# Patient Record
Sex: MALE | Race: WHITE | NOT HISPANIC OR LATINO | Employment: FULL TIME | URBAN - METROPOLITAN AREA
[De-identification: names, ages, dates, MRNs, and addresses within clinical notes are randomized per-mention and may not be internally consistent; named-entity substitution may affect disease eponyms.]

---

## 2017-01-04 DIAGNOSIS — I10 HYPERTENSION GOAL BP (BLOOD PRESSURE) < 140/90: Primary | ICD-10-CM

## 2017-01-04 RX ORDER — LISINOPRIL 40 MG/1
40 TABLET ORAL DAILY
Qty: 90 TABLET | Refills: 0 | Status: CANCELLED | OUTPATIENT
Start: 2017-01-04

## 2017-01-04 RX ORDER — HYDROCHLOROTHIAZIDE 25 MG/1
25 TABLET ORAL DAILY
Qty: 90 TABLET | Refills: 0 | Status: CANCELLED | OUTPATIENT
Start: 2017-01-04

## 2017-01-04 NOTE — TELEPHONE ENCOUNTER
Duplicate Request- this refill (same medication, sig, and indication) has already been processed and sent to pharmacy today.    Last Written Prescription Date:  lisinopril (PRINIVIL/ZESTRIL) 40 MG tablet 30 tablet 0 1/4/2017  No      Sig: TAKE 1 TABLET BY MOUTH ONCE DAILY     Class: E-Prescribe     Notes to Pharmacy: Approved x 1 with no refills, patient needs to be seen        hydrochlorothiazide (HYDRODIURIL) 25 MG tablet 30 tablet 0 1/4/2017  No      Sig: TAKE 1 TABLET BY MOUTH ONCE DAILY     Class: E-Prescribe     Notes to Pharmacy: Approved x 1 with no refills, patient needs to be seen         Patient informed RX sent and needs follow up appt for more refills.  appt scheduled for 1/10/17 at 5:40 pm    Krystal Willis RN,   [facility]

## 2017-01-04 NOTE — TELEPHONE ENCOUNTER
[facility] Call Center    Phone Message    Name of Caller: self    Phone Number: Cell number on file:    Telephone Information:   Mobile [phone]       Best time to return call: yes    May a detailed message be left on voicemail: yes    Relation to patient: Self    Reason for Call: Medication Refill Request    Has the patient contacted the pharmacy for the refill? Yes   Name of medication being requested: hydrochlorothiazide (HYDRODIURIL) 25 MG tablet,lisinopril (PRINIVIL,ZESTRIL) 40 MG tablet  Provider who prescribed the medication: Honey Ramey  Pharmacy: [facility]  Date medication is needed: asap! Pt has been out of medication for 5 days spouse states. Pt needs medication asap scared of BP high        Action Taken: Message routed to:  Primary Care p [id number]

## 2017-01-10 ENCOUNTER — OFFICE VISIT (OUTPATIENT)
Dept: PEDIATRICS | Facility: CLINIC | Age: 37
End: 2017-01-10
Payer: COMMERCIAL

## 2017-01-10 VITALS
WEIGHT: 315 LBS | BODY MASS INDEX: 42.66 KG/M2 | DIASTOLIC BLOOD PRESSURE: 60 MMHG | OXYGEN SATURATION: 100 % | HEIGHT: 72 IN | HEART RATE: 94 BPM | TEMPERATURE: 97.1 F | SYSTOLIC BLOOD PRESSURE: 115 MMHG

## 2017-01-10 DIAGNOSIS — Z13.6 CARDIOVASCULAR SCREENING; LDL GOAL LESS THAN 130: ICD-10-CM

## 2017-01-10 DIAGNOSIS — I10 HYPERTENSION GOAL BP (BLOOD PRESSURE) < 140/90: Primary | ICD-10-CM

## 2017-01-10 PROCEDURE — 99213 OFFICE O/P EST LOW 20 MIN: CPT | Performed by: NURSE PRACTITIONER

## 2017-01-10 RX ORDER — HYDROCHLOROTHIAZIDE 25 MG/1
25 TABLET ORAL DAILY
Qty: 90 TABLET | Refills: 1 | Status: SHIPPED | OUTPATIENT
Start: 2017-01-10 | End: 2017-10-04

## 2017-01-10 RX ORDER — LISINOPRIL 40 MG/1
40 TABLET ORAL DAILY
Qty: 90 TABLET | Refills: 1 | Status: SHIPPED | OUTPATIENT
Start: 2017-01-10 | End: 2017-10-04

## 2017-01-10 NOTE — MR AVS SNAPSHOT
After Visit Summary   1/10/2017    Armando Domingo    MRN: [id number]           Patient Information     Date Of Birth          1980        Visit Information        Provider Department      1/10/2017 5:40 PM Kami Ramey APRN CNP [facility]        Today's Diagnoses     Hypertension goal BP (blood pressure) < 140/90    -  1     CARDIOVASCULAR SCREENING; LDL GOAL LESS THAN 130           Care Instructions    It was a pleasure seeing you today at the [facility] - Primary Care. Thank you for allowing us to care for you today. We truly hope we provided you with the excellent service you deserve. Please let us know if there is anything else we can do for you so we can be sure you are leaving completley satisfied with your care experience.       General information about your clinic   Clinic Hours Lab Hours (Appointments are required)   Mon-Thurs: 7:30 AM - 7 PM Mon-Thurs: 7:30 AM - 7 PM   Fri: 7:30 AM - 5 PM Fri: 7:30 AM - 5 PM        After Hours Nurse Advise & Appts:  [place] Nurse Advisors: [phone]  [place] On Call: to make appointments anytime: [phone] On Call Physician: call [phone] and answering service will page the on call physician.        For urgent appointments, please call [phone] and ask for the triage nurse or your care team clinic nurse.  How to contact my care team:  MyChart: [email or web address]   Phone: [phone]   Fax: [phone]       [place] [facility]:   Phone: [phone]  Hours: 8:00 AM - 6:00 PM  Medication Refills:  Call your pharmacy and they will forward the refill to us. Please allow 3 business days for your refills to be completed.       Normal or non-critical lab and imaging results will be communicated to you by MyChart, letter or phone within 7 days.  If you do not hear from us within 10 days, please call the clinic. If you have a critical or abnormal lab result, we will notify you by phone as soon as  possible.       We now have PWIC (Pediatric Walk in Care)  Monday-Friday from 7:30-4. Simply walk in and be seen for your urgent needs like cough, fever, rash, diarrhea or vomiting, pink eye, UTI. No appointments needed. Ask one of the team for more information      -Your Care Team:    Dr. Natali Tucker - Internal Medicine/Pediatrics   Dr. Yogesh Zaldivar - Family Medicine  Dr. Dinora Baig - Pediatrics  Kami Ramey CNP - Family Practice Nurse Practitioner  Dr. Jennyfer Downs - Pediatrics  Dr. Romero Tierney - Internal Medicine          PLAN:   1.   Symptomatic therapy suggested: Continue current medications.    2.  Orders Placed This Encounter   Medications     lisinopril (PRINIVIL/ZESTRIL) 40 MG tablet     Sig: Take 1 tablet (40 mg) by mouth daily     Dispense:  90 tablet     Refill:  1     hydrochlorothiazide (HYDRODIURIL) 25 MG tablet     Sig: Take 1 tablet (25 mg) by mouth daily     Dispense:  90 tablet     Refill:  1     Orders Placed This Encounter   Procedures     Lipid panel reflex to direct LDL     Comprehensive metabolic panel     Albumin Random Urine Quantitative     JUST IN CASE       3. Patient needs to follow up in if no improvement,or sooner if worsening of symptoms or other symptoms develop.  FUTURE LABS:       - Schedule a fasting blood draw   Follow up in 6 months.  Schedule physical exam             Follow-ups after your visit        Future tests that were ordered for you today     Open Future Orders        Priority Expected Expires Ordered    Lipid panel reflex to direct LDL Routine  7/10/2017 1/10/2017    Comprehensive metabolic panel Routine  7/10/2017 1/10/2017    Albumin Random Urine Quantitative Routine  7/10/2017 1/10/2017    JUST IN CASE Routine  7/10/2017 1/10/2017            Who to contact     If you have questions or need follow up information about today's clinic visit or your schedule please contact [facility] directly at [phone].  Normal or non-critical lab and  "imaging results will be communicated to you by MyChart, letter or phone within 4 business days after the clinic has received the results. If you do not hear from us within 7 days, please contact the clinic through [employer] or phone. If you have a critical or abnormal lab result, we will notify you by phone as soon as possible.  Submit refill requests through [employer] or call your pharmacy and they will forward the refill request to us. Please allow 3 business days for your refill to be completed.          Additional Information About Your Visit        Mixers Information     [employer]ers is an electronic gateway that provides easy, online access to your medical records. With Mixers, you can request a clinic appointment, read your test results, renew a prescription or communicate with your care team.     To sign up for [employer]ers visit the website at [email or web address]   You will be asked to enter the access code listed below, as well as some personal information. Please follow the directions to create your username and password.     Your access code is: [email or web address]  Expires: 4/10/2017  6:07 PM     Your access code will  in 90 days. If you need help or a new code, please contact your [facility] or call [phone] for assistance.        Care EveryWhere ID     This is your Care EveryWhere ID. This could be used by other organizations to access your [place] medical records  [id number]        Your Vitals Were     Pulse Temperature Height BMI (Body Mass Index) Pulse Oximetry       94 97.1  F (36.2  C) (Temporal) 5' 11.5\" (1.816 m) 46.70 kg/m2 100%        Blood Pressure from Last 3 Encounters:   01/10/17 115/60   16 130/85   09/15/15 140/90    Weight from Last 3 Encounters:   01/10/17 339 lb 8 oz (153.996 kg)   16 333 lb (151.048 kg)   09/15/15 335 lb 4.8 oz (152.091 kg)                 Today's Medication Changes          These changes are accurate as of: 1/10/17  6:07 " PM.  If you have any questions, ask your nurse or doctor.               These medicines have changed or have updated prescriptions.        Dose/Directions    hydrochlorothiazide 25 MG tablet   Commonly known as:  HYDRODIURIL   This may have changed:  See the new instructions.   Used for:  Hypertension goal BP (blood pressure) < 140/90   Changed by:  Kami Ramey APRN CNP        Dose:  25 mg   Take 1 tablet (25 mg) by mouth daily   Quantity:  90 tablet   Refills:  1       lisinopril 40 MG tablet   Commonly known as:  PRINIVIL/ZESTRIL   This may have changed:  See the new instructions.   Used for:  Hypertension goal BP (blood pressure) < 140/90   Changed by:  Kami Ramey APRN CNP        Dose:  40 mg   Take 1 tablet (40 mg) by mouth daily   Quantity:  90 tablet   Refills:  1            Where to get your medicines      These medications were sent to [facility] [id number] [address] [address]     Phone:  [phone]    - hydrochlorothiazide 25 MG tablet  - lisinopril 40 MG tablet             Primary Care Provider    [facility]       No address on file        Thank you!     Thank you for choosing [facility]  for your care. Our goal is always to provide you with excellent care. Hearing back from our patients is one way we can continue to improve our services. Please take a few minutes to complete the written survey that you may receive in the mail after your visit with us. Thank you!             Your Updated Medication List - Protect others around you: Learn how to safely use, store and throw away your medicines at www.disposemymeds.org.          This list is accurate as of: 1/10/17  6:07 PM.  Always use your most recent med list.                   Brand Name Dispense Instructions for use    aspirin 81 MG tablet     30 tablet    Take 1 tablet by mouth daily.       hydrochlorothiazide 25  MG tablet    HYDRODIURIL    90 tablet    Take 1 tablet (25 mg) by mouth daily       lisinopril 40 MG tablet    PRINIVIL/ZESTRIL    90 tablet    Take 1 tablet (40 mg) by mouth daily

## 2017-01-10 NOTE — PATIENT INSTRUCTIONS
It was a pleasure seeing you today at the [facility] [[email or web address] - Primary Care. Thank you for allowing us to care for you today. We truly hope we provided you with the excellent service you deserve. Please let us know if there is anything else we can do for you so we can be sure you are leaving completley satisfied with your care experience.       General information about your clinic   Clinic Hours Lab Hours (Appointments are required)   Mon-Thurs: 7:30 AM - 7 PM Mon-Thurs: 7:30 AM - 7 PM   Fri: 7:30 AM - 5 PM Fri: 7:30 AM - 5 PM        After Hours Nurse Advise & Appts:  [place] Nurse Advisors: [phone]  [place] On Call: to make appointments anytime: [phone] On Call Physician: call [phone] and answering service will page the on call physician.        For urgent appointments, please call [phone] and ask for the triage nurse or your care team clinic nurse.  How to contact my care team:  [place]: [email or web address]   Phone: [phone]   Fax: [phone]       [place] [facility]:   Phone: [phone]  Hours: 8:00 AM - 6:00 PM  Medication Refills:  Call your pharmacy and they will forward the refill to us. Please allow 3 business days for your refills to be completed.       Normal or non-critical lab and imaging results will be communicated to you by MyChart, letter or phone within 7 days.  If you do not hear from us within 10 days, please call the clinic. If you have a critical or abnormal lab result, we will notify you by phone as soon as possible.       We now have PWIC (Pediatric Walk in Care)  Monday-Friday from 7:30-4. Simply walk in and be seen for your urgent needs like cough, fever, rash, diarrhea or vomiting, pink eye, UTI. No appointments needed. Ask one of the team for more information      -Your Care Team:    Dr. Natali Tucker - Internal Medicine/Pediatrics   Dr. Yogesh Zaldivar - Family Medicine  Dr. Dinora Baig - Pediatrics  Kami Ramey CNP - Family Practice Nurse  Practitioner  Dr. Jennyfer Downs - Pediatrics  Dr. Romero Tierney - Internal Medicine          PLAN:   1.   Symptomatic therapy suggested: Continue current medications.    2.  Orders Placed This Encounter   Medications     lisinopril (PRINIVIL/ZESTRIL) 40 MG tablet     Sig: Take 1 tablet (40 mg) by mouth daily     Dispense:  90 tablet     Refill:  1     hydrochlorothiazide (HYDRODIURIL) 25 MG tablet     Sig: Take 1 tablet (25 mg) by mouth daily     Dispense:  90 tablet     Refill:  1     Orders Placed This Encounter   Procedures     Lipid panel reflex to direct LDL     Comprehensive metabolic panel     Albumin Random Urine Quantitative     JUST IN CASE       3. Patient needs to follow up in if no improvement,or sooner if worsening of symptoms or other symptoms develop.  FUTURE LABS:       - Schedule a fasting blood draw   Follow up in 6 months.  Schedule physical exam

## 2017-01-10 NOTE — PROGRESS NOTES
SUBJECTIVE:                                                    Armando Domingo is a 36 year old male who presents to clinic today for the following health issues:    Hypertension Follow-up      Outpatient blood pressures are not being checked.    Low Salt Diet: not monitoring salt       Amount of exercise or physical activity: very little    Problems taking medications regularly: No    Medication side effects: none    Diet: regular (no restrictions)    Problem list and histories reviewed & adjusted, as indicated.  Additional history: as documented    Patient Active Problem List   Diagnosis     Hypertension goal BP (blood pressure) < 140/90     Hyperlipidemia LDL goal <130     Morbid obesity (H)     Past Surgical History   Procedure Laterality Date     Soft tissue surgery       cyst removal       Social History   Substance Use Topics     Smoking status: Former Smoker -- 0.10 packs/day for 15 years     Types: Cigarettes     Start date: 01/01/1996     Quit date: 01/01/2013     Smokeless tobacco: Never Used     Alcohol Use: 1.0 oz/week     2 Standard drinks or equivalent per week     Family History   Problem Relation Age of Onset     DIABETES Mother      Hypertension Mother      Cancer - colorectal Maternal Grandfather      DIABETES Paternal Grandmother      CEREBROVASCULAR DISEASE Paternal Grandmother      C.A.D. Father      Asthma No family hx of      Breast Cancer No family hx of      Prostate Cancer No family hx of      Thyroid Disease No family hx of      Lipids No family hx of          Current Outpatient Prescriptions   Medication Sig Dispense Refill     lisinopril (PRINIVIL/ZESTRIL) 40 MG tablet TAKE 1 TABLET BY MOUTH ONCE DAILY 30 tablet 0     hydrochlorothiazide (HYDRODIURIL) 25 MG tablet TAKE 1 TABLET BY MOUTH ONCE DAILY 30 tablet 0     aspirin 81 MG tablet Take 1 tablet by mouth daily. 30 tablet 3     No Known Allergies  BP Readings from Last 3 Encounters:   01/10/17 115/60   05/05/16 130/85   09/15/15  "140/90    Wt Readings from Last 3 Encounters:   01/10/17 339 lb 8 oz (153.996 kg)   05/05/16 333 lb (151.048 kg)   09/15/15 335 lb 4.8 oz (152.091 kg)            ROS:  CONSTITUTIONAL:NEGATIVE for fever, chills, change in weight  ENT/MOUTH: NEGATIVE for ear, mouth and throat problems  RESP:NEGATIVE for significant cough or SOB  CV: NEGATIVE for chest pain, palpitations or peripheral edema  GI: NEGATIVE for nausea, abdominal pain, heartburn, or change in bowel habits  MUSCULOSKELETAL: NEGATIVE for significant arthralgias or myalgia  NEURO: NEGATIVE for weakness, dizziness or paresthesias    OBJECTIVE:                                                    /60 mmHg  Pulse 94  Temp(Src) 97.1  F (36.2  C) (Temporal)  Ht 5' 11.5\" (1.816 m)  Wt 339 lb 8 oz (153.996 kg)  BMI 46.70 kg/m2  SpO2 100%  Body mass index is 46.7 kg/(m^2).   Wt Readings from Last 4 Encounters:   01/10/17 339 lb 8 oz (153.996 kg)   05/05/16 333 lb (151.048 kg)   09/15/15 335 lb 4.8 oz (152.091 kg)   06/05/15 333 lb 12.8 oz (151.411 kg)       GENERAL APPEARANCE: alert, active, no distress and Nourishment morbidly obese   HENT: ear canals and TM's normal and nose and mouth without ulcers or lesions  NECK: no adenopathy and thyroid normal to palpation  RESP: lungs clear to auscultation - no rales, rhonchi or wheezes  CV: regular rates and rhythm and no murmur, click or rub  MS: extremities normal- no gross deformities noted  NEURO: Normal strength and tone, mentation intact and speech normal  PSYCH: mentation appears normal and affect normal/bright    Diagnostic Test Results:  Pending orders        ASSESSMENT/PLAN:                                                      Armando was seen today for hypertension.    Diagnoses and all orders for this visit:    Hypertension goal BP (blood pressure) < 140/90  -     lisinopril (PRINIVIL/ZESTRIL) 40 MG tablet; Take 1 tablet (40 mg) by mouth daily  -     hydrochlorothiazide (HYDRODIURIL) 25 MG tablet; Take 1 " tablet (25 mg) by mouth daily  -     Comprehensive metabolic panel; Future  -     Albumin Random Urine Quantitative; Future  -     JUST IN CASE; Future    CARDIOVASCULAR SCREENING; LDL GOAL LESS THAN 130  -     Lipid panel reflex to direct LDL; Future      PLAN:    Patient needs to follow up in if no improvement,or sooner if worsening of symptoms or other symptoms develop.  FUTURE LABS:       - Schedule a fasting blood draw   Follow up in 6 months.  Schedule physical exam     See Patient Instructions    JAYE Gonzalez [facility]

## 2017-01-10 NOTE — NURSING NOTE
"Chief Complaint   Patient presents with     Hypertension       Initial /60 mmHg  Pulse 94  Temp(Src) 97.1  F (36.2  C) (Temporal)  Ht 5' 11.5\" (1.816 m)  Wt 339 lb 8 oz (153.996 kg)  BMI 46.70 kg/m2  SpO2 100% Estimated body mass index is 46.7 kg/(m^2) as calculated from the following:    Height as of this encounter: 5' 11.5\" (1.816 m).    Weight as of this encounter: 339 lb 8 oz (153.996 kg)..  BP completed using cuff size: GABY Wiley    "

## 2017-06-19 ENCOUNTER — OFFICE VISIT (OUTPATIENT)
Dept: PEDIATRICS | Facility: CLINIC | Age: 37
End: 2017-06-19
Payer: COMMERCIAL

## 2017-06-19 ENCOUNTER — RADIANT APPOINTMENT (OUTPATIENT)
Dept: GENERAL RADIOLOGY | Facility: CLINIC | Age: 37
End: 2017-06-19
Attending: NURSE PRACTITIONER
Payer: COMMERCIAL

## 2017-06-19 VITALS
TEMPERATURE: 97.2 F | BODY MASS INDEX: 48.56 KG/M2 | SYSTOLIC BLOOD PRESSURE: 120 MMHG | WEIGHT: 315 LBS | DIASTOLIC BLOOD PRESSURE: 70 MMHG | OXYGEN SATURATION: 95 % | HEART RATE: 90 BPM

## 2017-06-19 DIAGNOSIS — M62.838 NECK MUSCLE SPASM: ICD-10-CM

## 2017-06-19 DIAGNOSIS — E78.5 HYPERLIPIDEMIA LDL GOAL <130: ICD-10-CM

## 2017-06-19 DIAGNOSIS — I10 HYPERTENSION GOAL BP (BLOOD PRESSURE) < 140/90: ICD-10-CM

## 2017-06-19 DIAGNOSIS — R63.5 ABNORMAL WEIGHT GAIN: ICD-10-CM

## 2017-06-19 DIAGNOSIS — M54.2 NECK PAIN: Primary | ICD-10-CM

## 2017-06-19 DIAGNOSIS — M54.2 NECK PAIN: ICD-10-CM

## 2017-06-19 PROCEDURE — 72040 X-RAY EXAM NECK SPINE 2-3 VW: CPT | Performed by: RADIOLOGY

## 2017-06-19 PROCEDURE — 99213 OFFICE O/P EST LOW 20 MIN: CPT | Performed by: NURSE PRACTITIONER

## 2017-06-19 NOTE — PROGRESS NOTES
SUBJECTIVE:                                                    Armando Domingo is a 36 year old male who presents to clinic today for the following health issues:    Neck Pain     Onset: 2-3 monoths    Description:   Location: worst on the right side, feels like there a tender spot. Does have occasional light headedness   Radiation: into the right neck    Intensity: moderate    Progression of Symptoms:  worsening    Accompanying Signs & Symptoms:  Burning, prickly sensation (paresthesias) in arm(s): no   Numbness in arm(s): no   Weakness in arm(s):  no   Fever: no   Headache: no   Nausea and/or vomiting: no    History:   Trauma: no   Previous neck pain: no   Previous surgery or injections: no   Previous Imaging (MRI,X ray): no     Precipitating factors:   Does movement increase the pain:  YES-stiffness when turning head    Alleviating factors:  none     Therapies Tried and outcome:  massage  Has not tried any medications  No numbness in arms.   Aggravated by movement   Possible lightheaded with movement but seems to come and go with the pain     Problem list and histories reviewed & adjusted, as indicated.  Additional history: as documented    Patient Active Problem List   Diagnosis     Hypertension goal BP (blood pressure) < 140/90     Hyperlipidemia LDL goal <130     Morbid obesity (H)     Past Surgical History:   Procedure Laterality Date     SOFT TISSUE SURGERY      cyst removal       Social History   Substance Use Topics     Smoking status: Former Smoker     Packs/day: 0.10     Years: 15.00     Types: Cigarettes     Start date: 1/1/1996     Quit date: 1/1/2013     Smokeless tobacco: Never Used     Alcohol use 1.0 oz/week     2 Standard drinks or equivalent per week     Family History   Problem Relation Age of Onset     DIABETES Mother      Hypertension Mother      Cancer - colorectal Maternal Grandfather      DIABETES Paternal Grandmother      CEREBROVASCULAR DISEASE Paternal Grandmother      C.A.D. Father       Asthma No family hx of      Breast Cancer No family hx of      Prostate Cancer No family hx of      Thyroid Disease No family hx of      Lipids No family hx of          Current Outpatient Prescriptions   Medication Sig Dispense Refill     lisinopril (PRINIVIL/ZESTRIL) 40 MG tablet Take 1 tablet (40 mg) by mouth daily 90 tablet 1     hydrochlorothiazide (HYDRODIURIL) 25 MG tablet Take 1 tablet (25 mg) by mouth daily 90 tablet 1     aspirin 81 MG tablet Take 1 tablet by mouth daily. 30 tablet 3     No Known Allergies    Reviewed and updated as needed this visit by clinical staff  Tobacco  Allergies  Meds  Med Hx  Surg Hx  Fam Hx  Soc Hx      Reviewed and updated as needed this visit by Provider         ROS:  CONSTITUTIONAL:NEGATIVE for fever, chills, change in weight  ENT/MOUTH: NEGATIVE for ear, mouth and throat problems  RESP:NEGATIVE for significant cough or SOB  CV: NEGATIVE for chest pain/chest pressure  GI: NEGATIVE for nausea, poor appetite, vomiting and weight loss  MUSCULOSKELETAL: POSITIVE  for muscle spasm  and neck pain and NEGATIVE for joint swelling  and joint warmth   NEURO: NEGATIVE for weakness, dizziness or paresthesias    OBJECTIVE:                                                    /70 (BP Location: Right arm, Patient Position: Sitting, Cuff Size: Adult Large)  Pulse 90  Temp 97.2  F (36.2  C) (Temporal)  Wt (!) 353 lb 1.6 oz (160.2 kg)  SpO2 95%  BMI 48.56 kg/m2  Body mass index is 48.56 kg/(m^2).   Wt Readings from Last 4 Encounters:   06/19/17 (!) 353 lb 1.6 oz (160.2 kg)   01/10/17 (!) 339 lb 8 oz (154 kg)   05/05/16 (!) 333 lb (151 kg)   09/15/15 (!) 335 lb 4.8 oz (152.1 kg)       GENERAL APPEARANCE: alert, active and no distress  NECK: no adenopathy and thyroid normal to palpation  RESP: lungs clear to auscultation - no rales, rhonchi or wheezes  CV: regular rates and rhythm and no murmur, click or rub  MS: extremities normal- no gross deformities noted and peripheral  pulses normal  ORTHO: Cervical Spine Exam: Inspection: normal cervical lordosis  Tender:  left paracervical muscles, right paracervical muscles  Non-tender:  spinous processes  Range of Motion:  Full ROM of cervical spine  Strength: Full strength of all neck muscles  SKIN: no suspicious lesions or rashes  PSYCH: mentation appears normal and affect normal/bright    Diagnostic Test Results:  Recent Results (from the past 744 hour(s))   XR Cervical Spine 2/3 Views    Narrative    Exam: 4 views of the cervical spine dated 6/19/2017.    COMPARISON: MRI dated 5/21/2011.    CLINICAL HISTORY: Neck pain.    FINDINGS: AP, odontoid, and lateral views of the cervical spine were  obtained. The odontoid view is suboptimal. The lateral masses of C1  are well aligned with the C2 vertebral body. The cervical vertebral  bodies are well-seen through the lower aspect of C7 on the lateral  view. T1 is partially obscured due to overlying soft tissue density.  There is loss of the normal cervical lordosis. No prevertebral soft  tissue swelling. The cervical vertebral bodies are well-maintained.  Trace anterolisthesis of C4 in relation to C5.      Impression    IMPRESSION: Trace anterolisthesis of C4 in relation to C5, otherwise  no acute bone abnormality in the cervical spine on plain radiographs.    LESLIE WILLSON MD          ASSESSMENT/PLAN:                                                      Armando was seen today for neck pain.    Diagnoses and all orders for this visit:    Neck pain  -     XR Cervical Spine 2/3 Views; Future  Will follow up and/or notify patient of  results via My Chart to determine further need for followup    Neck muscle spasm  -     tiZANidine (ZANAFLEX) 4 MG tablet; Take 1 tablet (4 mg) by mouth 3 times daily as needed for muscle spasms  Symptomatic therapy suggested: OTC aleve and call prn if symptoms persist or worsen.  apply heat to affected area, apply ice to affected area, prescription for muscle  relaxant    Hypertension goal BP (blood pressure) < 140/90  -     **Comprehensive metabolic panel FUTURE anytime; Future  -     **Albumin Random Urine Quant FUTURE anytime; Future  -     JUST IN CASE; Future    Hyperlipidemia LDL goal <130  -     **Lipid panel reflex to direct LDL FUTURE anytime; Future    Abnormal weight gain  -     TSH with free T4 reflex; Future    PLAN:   1.   Symptomatic therapy suggested: OTC aleve one twice a day  and call prn if symptoms persist or worsen.    2. Patient needs to follow up in if no improvement,or sooner if worsening of symptoms or other symptoms develop.  FUTURE LABS:       - Schedule a fasting blood draw   Will follow up and/or notify patient of  results via My Chart to determine further need for followup        See Patient Instructions    JAYE Gonzalez [facility]

## 2017-06-19 NOTE — MR AVS SNAPSHOT
After Visit Summary   6/19/2017    Armando Domingo    MRN: [id number]           Patient Information     Date Of Birth          1980        Visit Information        Provider Department      6/19/2017 4:40 PM Kami Ramey APRN CNP [facility]        Today's Diagnoses     Neck pain    -  1    Hypertension goal BP (blood pressure) < 140/90        Hyperlipidemia LDL goal <130        Abnormal weight gain        Neck muscle spasm          Care Instructions    PLAN:   1.   Symptomatic therapy suggested: OTC aleve one twice a day  and call prn if symptoms persist or worsen.    2.  Orders Placed This Encounter   Medications     tiZANidine (ZANAFLEX) 4 MG tablet     Sig: Take 1 tablet (4 mg) by mouth 3 times daily as needed for muscle spasms     Dispense:  30 tablet     Refill:  1     Orders Placed This Encounter   Procedures     XR Cervical Spine 2/3 Views     **Lipid panel reflex to direct LDL FUTURE anytime     **Comprehensive metabolic panel FUTURE anytime     **Albumin Random Urine Quant FUTURE anytime     JUST IN CASE     TSH with free T4 reflex       3. Patient needs to follow up in if no improvement,or sooner if worsening of symptoms or other symptoms develop.  FUTURE LABS:       - Schedule a fasting blood draw   Will follow up and/or notify patient of  results via My Chart to determine further need for followup      It was a pleasure seeing you today at the [facility]. Thank you for allowing us to care for you today. We truly hope we provided you with the excellent service you deserve. Please let us know if there is anything else we can do for you so we can be sure you are leaving completley satisfied with your care experience.       General information about your clinic   Clinic Hours Lab Hours (Appointments are required)   Mon-Thurs: 7:30 AM - 7 PM Mon-Thurs: 7:30 AM - 7 PM   Fri: 7:30 AM - 5 PM Fri: 7:30 AM - 5 PM        After Hours Nurse  Advise & Appts:  [facility] Nurse Advisors: [phone]  [facility] On Call: to make appointments anytime: [phone] On Call Physician: call [phone] and answering service will page the on call physician.        For urgent appointments, please call [phone] and ask for the triage nurse or your care team clinic nurse.  How to contact my care team:  MyChart: [email or web address]   Phone: [phone]   Fax: [phone]       [facility]:   Phone: [phone]  Hours: 8:00 AM - 6:00 PM  Medication Refills:  Call your pharmacy and they will forward the refill to us. Please allow 3 business days for your refills to be completed.       Normal or non-critical lab and imaging results will be communicated to you by MyChart, letter or phone within 7 days.  If you do not hear from us within 10 days, please call the clinic. If you have a critical or abnormal lab result, we will notify you by phone as soon as possible.       We now have PWIC (Pediatric Walk in Care)  Monday-Friday from 7:30-4. Simply walk in and be seen for your urgent needs like cough, fever, rash, diarrhea or vomiting, pink eye, UTI. No appointments needed. Ask one of the team for more information      -Your Care Team:    Dr. Natali Tucker - Internal Medicine/Pediatrics   Dr. Yogesh Zaldivar - Family Medicine  Dr. Dinora Baig - Pediatrics  Kami Ramey CNP - Family Practice Nurse Practitioner  Dr. Jennyfer Downs - Pediatrics  Dr. Romero Tierney - Internal Medicine                    Follow-ups after your visit        Future tests that were ordered for you today     Open Future Orders        Priority Expected Expires Ordered    TSH with free T4 reflex Routine  6/19/2018 6/19/2017    **Lipid panel reflex to direct LDL FUTURE anytime Routine 6/19/2017 12/19/2017 6/19/2017    **Comprehensive metabolic panel FUTURE anytime Routine 6/19/2017 12/19/2017 6/19/2017    **Albumin Random Urine Quant FUTURE anytime Routine 6/19/2017 12/19/2017 6/19/2017     JUST IN CASE Routine  2017    XR Cervical Spine 2/3 Views Routine 2017            Who to contact     If you have questions or need follow up information about today's clinic visit or your schedule please contact [facility] directly at [phone].  Normal or non-critical lab and imaging results will be communicated to you by MyChart, letter or phone within 4 business days after the clinic has received the results. If you do not hear from us within 7 days, please contact the clinic through [employer] or phone. If you have a critical or abnormal lab result, we will notify you by phone as soon as possible.  Submit refill requests through [employer] or call your pharmacy and they will forward the refill request to us. Please allow 3 business days for your refill to be completed.          Additional Information About Your Visit        [employer] Information     [employer] is an electronic gateway that provides easy, online access to your medical records. With [employer], you can request a clinic appointment, read your test results, renew a prescription or communicate with your care team.     To sign up for [employer] visit the website at [email or web address]   You will be asked to enter the access code listed below, as well as some personal information. Please follow the directions to create your username and password.     Your access code is: [email or web address]  Expires: 2017  5:05 PM     Your access code will  in 90 days. If you need help or a new code, please contact your [facility] or call [phone] for assistance.        Care EveryWhere ID     This is your Care EveryWhere ID. This could be used by other organizations to access your [place] medical records  [id number]        Your Vitals Were     Pulse Temperature Pulse Oximetry BMI (Body Mass Index)          90 97.2  F (36.2  C) (Temporal) 95% 48.56 kg/m2         Blood Pressure  from Last 3 Encounters:   06/19/17 120/70   01/10/17 115/60   05/05/16 130/85    Weight from Last 3 Encounters:   06/19/17 (!) 353 lb 1.6 oz (160.2 kg)   01/10/17 (!) 339 lb 8 oz (154 kg)   05/05/16 (!) 333 lb (151 kg)                 Today's Medication Changes          These changes are accurate as of: 6/19/17  5:05 PM.  If you have any questions, ask your nurse or doctor.               Start taking these medicines.        Dose/Directions    tiZANidine 4 MG tablet   Commonly known as:  ZANAFLEX   Used for:  Neck muscle spasm   Started by:  Kami Ramey APRN CNP        Dose:  4 mg   Take 1 tablet (4 mg) by mouth 3 times daily as needed for muscle spasms   Quantity:  30 tablet   Refills:  1            Where to get your medicines      These medications were sent to [facility] [place] [facility] [address], [address]  [address], [address], [facility] [address]     Phone:  [phone]     tiZANidine 4 MG tablet                Primary Care Provider Office Phone # Fax #    JAYE Gonzalez [phone]       [facility] [address]  [place] [address]        Thank you!     Thank you for choosing [facility]  for your care. Our goal is always to provide you with excellent care. Hearing back from our patients is one way we can continue to improve our services. Please take a few minutes to complete the written survey that you may receive in the mail after your visit with us. Thank you!             Your Updated Medication List - Protect others around you: Learn how to safely use, store and throw away your medicines at www.disposemymeds.org.          This list is accurate as of: 6/19/17  5:05 PM.  Always use your most recent med list.                   Brand Name Dispense Instructions for use    aspirin 81 MG tablet     30 tablet    Take 1 tablet by mouth daily.       hydrochlorothiazide 25 MG tablet     HYDRODIURIL    90 tablet    Take 1 tablet (25 mg) by mouth daily       lisinopril 40 MG tablet    PRINIVIL/ZESTRIL    90 tablet    Take 1 tablet (40 mg) by mouth daily       tiZANidine 4 MG tablet    ZANAFLEX    30 tablet    Take 1 tablet (4 mg) by mouth 3 times daily as needed for muscle spasms

## 2017-06-19 NOTE — PATIENT INSTRUCTIONS
PLAN:   1.   Symptomatic therapy suggested: OTC aleve one twice a day  and call prn if symptoms persist or worsen.    2.  Orders Placed This Encounter   Medications     tiZANidine (ZANAFLEX) 4 MG tablet     Sig: Take 1 tablet (4 mg) by mouth 3 times daily as needed for muscle spasms     Dispense:  30 tablet     Refill:  1     Orders Placed This Encounter   Procedures     XR Cervical Spine 2/3 Views     **Lipid panel reflex to direct LDL FUTURE anytime     **Comprehensive metabolic panel FUTURE anytime     **Albumin Random Urine Quant FUTURE anytime     JUST IN CASE     TSH with free T4 reflex       3. Patient needs to follow up in if no improvement,or sooner if worsening of symptoms or other symptoms develop.  FUTURE LABS:       - Schedule a fasting blood draw   Will follow up and/or notify patient of  results via My Chart to determine further need for followup      It was a pleasure seeing you today at the [facility]. Thank you for allowing us to care for you today. We truly hope we provided you with the excellent service you deserve. Please let us know if there is anything else we can do for you so we can be sure you are leaving completley satisfied with your care experience.       General information about your clinic   Clinic Hours Lab Hours (Appointments are required)   Mon-Thurs: 7:30 AM - 7 PM Mon-Thurs: 7:30 AM - 7 PM   Fri: 7:30 AM - 5 PM Fri: 7:30 AM - 5 PM        After Hours Nurse Advise & Appts:  [facility] Nurse Advisors: [phone]  [facility] On Call: to make appointments anytime: [phone] On Call Physician: call [phone] and answering service will page the on call physician.        For urgent appointments, please call [phone] and ask for the triage nurse or your care team clinic nurse.  How to contact my care team:  MyChart: [email or web address]   Phone: [phone]   Fax: [phone]       [facility]:   Phone: [phone]  Hours: 8:00 AM -  6:00 PM  Medication Refills:  Call your pharmacy and they will forward the refill to us. Please allow 3 business days for your refills to be completed.       Normal or non-critical lab and imaging results will be communicated to you by MyChart, letter or phone within 7 days.  If you do not hear from us within 10 days, please call the clinic. If you have a critical or abnormal lab result, we will notify you by phone as soon as possible.       We now have PWIC (Pediatric Walk in Care)  Monday-Friday from 7:30-4. Simply walk in and be seen for your urgent needs like cough, fever, rash, diarrhea or vomiting, pink eye, UTI. No appointments needed. Ask one of the team for more information      -Your Care Team:    Dr. Natali Tucker - Internal Medicine/Pediatrics   Dr. Yogesh Zaldivar - Family Medicine  Dr. Dinora Baig - Pediatrics  Kami Ramey CNP - Family Practice Nurse Practitioner  Dr. Jennyfer Downs - Pediatrics  Dr. Romero Tierney - Internal Medicine

## 2017-06-19 NOTE — NURSING NOTE
"Chief Complaint   Patient presents with     Neck Pain     neck pain, more on the right side with tender spot on and off 2-3 months       Initial /70 (BP Location: Right arm, Patient Position: Sitting, Cuff Size: Adult Large)  Pulse 90  Temp 97.2  F (36.2  C) (Temporal)  Wt (!) 353 lb 1.6 oz (160.2 kg)  SpO2 95%  BMI 48.56 kg/m2 Estimated body mass index is 48.56 kg/(m^2) as calculated from the following:    Height as of 1/10/17: 5' 11.5\" (1.816 m).    Weight as of this encounter: 353 lb 1.6 oz (160.2 kg).  Medication Reconciliation: complete      GABY Davila      "

## 2017-06-20 ENCOUNTER — TELEPHONE (OUTPATIENT)
Dept: PEDIATRICS | Facility: CLINIC | Age: 37
End: 2017-06-20

## 2017-06-20 NOTE — TELEPHONE ENCOUNTER
Notes Recorded by Kami Ramey APRN CNP on 6/20/2017 at 8:10 AM  Loss of the healthy curve in your neck can be due to a combination of factors.  Sometimes, it's not one event in particular, but a combination of accidents or injuries and repetitive, prolonged actions, such as constantly looking down at a phone or computer.  This repetitive posture puts strain on the body over time and can cause neck pain.  Would recommend physical therapy   Please call [phone] to make appointment  if you do not hear from referrals in the next few days.

## 2017-06-20 NOTE — TELEPHONE ENCOUNTER
Called patient, left message with phone number to call back.       Josselin Verma RN, [facility]

## 2017-06-20 NOTE — TELEPHONE ENCOUNTER
Patient advised of information below per Honey Ramey.  Patient states understanding.  Patient advised to call [phone] to schedule appt with physical therapy if he does not hear from physical therapy within a few days.    Lida Sales CMA

## 2017-07-03 ENCOUNTER — THERAPY VISIT (OUTPATIENT)
Dept: PHYSICAL THERAPY | Facility: CLINIC | Age: 37
End: 2017-07-03
Payer: COMMERCIAL

## 2017-07-03 ENCOUNTER — OFFICE VISIT (OUTPATIENT)
Dept: PEDIATRICS | Facility: CLINIC | Age: 37
End: 2017-07-03
Payer: COMMERCIAL

## 2017-07-03 VITALS
TEMPERATURE: 97.3 F | SYSTOLIC BLOOD PRESSURE: 130 MMHG | DIASTOLIC BLOOD PRESSURE: 86 MMHG | BODY MASS INDEX: 48 KG/M2 | OXYGEN SATURATION: 99 % | HEART RATE: 89 BPM | WEIGHT: 315 LBS

## 2017-07-03 DIAGNOSIS — I10 HYPERTENSION GOAL BP (BLOOD PRESSURE) < 140/90: ICD-10-CM

## 2017-07-03 DIAGNOSIS — Z13.29 SCREENING FOR ENDOCRINE DISORDER: ICD-10-CM

## 2017-07-03 DIAGNOSIS — R63.5 ABNORMAL WEIGHT GAIN: ICD-10-CM

## 2017-07-03 DIAGNOSIS — K76.0 HEPATIC STEATOSIS: ICD-10-CM

## 2017-07-03 DIAGNOSIS — R55 VASOVAGAL SYNCOPE: Primary | ICD-10-CM

## 2017-07-03 DIAGNOSIS — R80.9 POSITIVE FOR MICROALBUMINURIA: ICD-10-CM

## 2017-07-03 DIAGNOSIS — F10.20 ALCOHOL DEPENDENCE, CONTINUOUS (H): ICD-10-CM

## 2017-07-03 DIAGNOSIS — M54.2 CERVICALGIA: Primary | ICD-10-CM

## 2017-07-03 DIAGNOSIS — E78.5 HYPERLIPIDEMIA LDL GOAL <130: ICD-10-CM

## 2017-07-03 LAB
ALBUMIN SERPL-MCNC: 4 G/DL (ref 3.4–5)
ALP SERPL-CCNC: 62 U/L (ref 40–150)
ALT SERPL W P-5'-P-CCNC: 60 U/L (ref 0–70)
ANION GAP SERPL CALCULATED.3IONS-SCNC: 8 MMOL/L (ref 3–14)
AST SERPL W P-5'-P-CCNC: 29 U/L (ref 0–45)
BILIRUB SERPL-MCNC: 0.5 MG/DL (ref 0.2–1.3)
BUN SERPL-MCNC: 19 MG/DL (ref 7–30)
CALCIUM SERPL-MCNC: 9.2 MG/DL (ref 8.5–10.1)
CHLORIDE SERPL-SCNC: 102 MMOL/L (ref 94–109)
CHOLEST SERPL-MCNC: 185 MG/DL
CO2 SERPL-SCNC: 29 MMOL/L (ref 20–32)
CREAT SERPL-MCNC: 1.09 MG/DL (ref 0.66–1.25)
CREAT UR-MCNC: 125 MG/DL
ERYTHROCYTE [DISTWIDTH] IN BLOOD BY AUTOMATED COUNT: 12.9 % (ref 10–15)
GFR SERPL CREATININE-BSD FRML MDRD: 77 ML/MIN/1.7M2
GLUCOSE SERPL-MCNC: 105 MG/DL (ref 70–99)
HCT VFR BLD AUTO: 49.5 % (ref 40–53)
HDLC SERPL-MCNC: 41 MG/DL
HGB BLD-MCNC: 16.6 G/DL (ref 13.3–17.7)
LDLC SERPL CALC-MCNC: 121 MG/DL
MCH RBC QN AUTO: 29.5 PG (ref 26.5–33)
MCHC RBC AUTO-ENTMCNC: 33.5 G/DL (ref 31.5–36.5)
MCV RBC AUTO: 88 FL (ref 78–100)
MICROALBUMIN UR-MCNC: 21 MG/L
MICROALBUMIN/CREAT UR: 17.12 MG/G CR (ref 0–17)
NONHDLC SERPL-MCNC: 144 MG/DL
PLATELET # BLD AUTO: 268 10E9/L (ref 150–450)
POTASSIUM SERPL-SCNC: 4.1 MMOL/L (ref 3.4–5.3)
PROT SERPL-MCNC: 8.3 G/DL (ref 6.8–8.8)
RBC # BLD AUTO: 5.62 10E12/L (ref 4.4–5.9)
SODIUM SERPL-SCNC: 139 MMOL/L (ref 133–144)
TRIGL SERPL-MCNC: 116 MG/DL
TSH SERPL DL<=0.005 MIU/L-ACNC: 2.7 MU/L (ref 0.4–4)
WBC # BLD AUTO: 8.6 10E9/L (ref 4–11)

## 2017-07-03 PROCEDURE — 97161 PT EVAL LOW COMPLEX 20 MIN: CPT | Mod: GP | Performed by: PHYSICAL THERAPIST

## 2017-07-03 PROCEDURE — 85027 COMPLETE CBC AUTOMATED: CPT | Performed by: INTERNAL MEDICINE

## 2017-07-03 PROCEDURE — 80061 LIPID PANEL: CPT | Performed by: NURSE PRACTITIONER

## 2017-07-03 PROCEDURE — 82306 VITAMIN D 25 HYDROXY: CPT | Performed by: INTERNAL MEDICINE

## 2017-07-03 PROCEDURE — 82043 UR ALBUMIN QUANTITATIVE: CPT | Performed by: NURSE PRACTITIONER

## 2017-07-03 PROCEDURE — 80053 COMPREHEN METABOLIC PANEL: CPT | Performed by: NURSE PRACTITIONER

## 2017-07-03 PROCEDURE — 97110 THERAPEUTIC EXERCISES: CPT | Mod: GP | Performed by: PHYSICAL THERAPIST

## 2017-07-03 PROCEDURE — 97112 NEUROMUSCULAR REEDUCATION: CPT | Mod: GP | Performed by: PHYSICAL THERAPIST

## 2017-07-03 PROCEDURE — 36415 COLL VENOUS BLD VENIPUNCTURE: CPT | Performed by: INTERNAL MEDICINE

## 2017-07-03 PROCEDURE — 84443 ASSAY THYROID STIM HORMONE: CPT | Performed by: NURSE PRACTITIONER

## 2017-07-03 PROCEDURE — 99214 OFFICE O/P EST MOD 30 MIN: CPT | Performed by: INTERNAL MEDICINE

## 2017-07-03 NOTE — PATIENT INSTRUCTIONS
Make an appointment:   -- clinic follow up in 3 months with Dr. Tucker and repeat lab (not fasting)      Consider:  Weight watcher  17 Day Diet  Digest Diet  30 Day Whole Diet          Understanding Vasovagal Syncope  Vasovagal syncope is fainting caused by a complex nerve and blood vessel reaction in the body. It s the most common cause of fainting. Unlike other causes of fainting, it s not a sign of a problem with the heart or brain.     How to say it  [id number]  [id number]   How vasovagal syncope happens  Many nerves connect with your heart and blood vessels. These nerves help control the speed and force of your heartbeat. They also regulate blood pressure. They control whether your blood vessels should be more open or more closed.  Usually these nerves work together so you always get enough blood to your brain. In certain cases these nerves may give a wrong signal. This may cause your blood vessels to open wide. At the same time, your heartbeat slows down. Blood can start to pool in your legs, and not enough of it may reach the brain. If that happens, you may briefly lose consciousness. When you lie down or fall down, blood flow to the brain resumes.  What causes vasovagal syncope?  Many triggers can cause vasovagal syncope, such as:    Standing for long periods    Too much heat    Intense emotion, such as fear    Intense pain    The sight of blood or a needle    Exercising for a long time  Older adults may have additional triggers, such as:    Urinating    Swallowing    Coughing    Having a bowel movement  Symptoms of vasovagal syncope  Fainting is the main symptom of vasovagal syncope. You may have symptoms before fainting such as:    Nausea    Warm, flushed feeling    Face that turns pale    Sweaty palms    Feeling dizzy    Blurred vision  If you lie down at the first sign of these symptoms, you will often be able to prevent fainting. Not everyone notices symptoms before fainting, however.  When a person  does faint, lying down restores blood flow to the brain. Consciousness should return fairly quickly. You might not feel normal for a little while after you faint. You might feel depressed or fatigued for a short time.  Some people have only 1 or 2 episodes of vasovagal syncope in their life. For others, it happens more often and with no warning.  Diagnosing vasovagal syncope  Your doctor will ask about your health history and your symptoms. He or she will give you a physical exam. Your blood pressure may be measured while lying down and while standing. You may also have an electrocardiogram (ECG). This is a simple test that looks at the heart s rhythm.  You may be checked for other possible causes of fainting. You may have other tests such as:    Continuous portable ECG monitoring, to look at heart rhythms over time    Echocardiogram, to look at the blood flow in the heart and the heart s motion    Exercise stress testing, to see how your heart does during exercise    Blood tests to check for signs of disease  If these tests are normal, you may have a tilt table test. For this test, you lie down on a platform. Your heart rate and blood pressure are measured while you are lying down. The platform is then tilted upright. Your heart rate and blood pressure are measured again. If you have vasovagal syncope, you may faint during the upward tilt.  Date Last Reviewed: 6/19/2015 2000-2017 [employer]. [address] [place] [address], [place] [address]. All rights reserved. This information is not intended as a substitute for professional medical care. Always follow your healthcare professional's instructions.

## 2017-07-03 NOTE — MR AVS SNAPSHOT
After Visit Summary   7/3/2017    Armando Domingo    MRN: [id number]           Patient Information     Date Of Birth          1980        Visit Information        Provider Department      7/3/2017 11:40 AM Natali Tucker MD [facility]        Today's Diagnoses     Vasovagal syncope    -  1    Positive for microalbuminuria        Alcohol dependence, continuous (H)        Hepatic steatosis        Screening for endocrine disorder          Care Instructions    Make an appointment:   -- clinic follow up in 3 months with Dr. Tucker and repeat lab (not fasting)      Consider:  Weight watcher  17 Day Diet  Digest Diet  30 Day Whole Diet          Understanding Vasovagal Syncope  Vasovagal syncope is fainting caused by a complex nerve and blood vessel reaction in the body. It s the most common cause of fainting. Unlike other causes of fainting, it s not a sign of a problem with the heart or brain.     How to say it  BPI-al-HYO-gull  SINK-o-pee   How vasovagal syncope happens  Many nerves connect with your heart and blood vessels. These nerves help control the speed and force of your heartbeat. They also regulate blood pressure. They control whether your blood vessels should be more open or more closed.  Usually these nerves work together so you always get enough blood to your brain. In certain cases these nerves may give a wrong signal. This may cause your blood vessels to open wide. At the same time, your heartbeat slows down. Blood can start to pool in your legs, and not enough of it may reach the brain. If that happens, you may briefly lose consciousness. When you lie down or fall down, blood flow to the brain resumes.  What causes vasovagal syncope?  Many triggers can cause vasovagal syncope, such as:    Standing for long periods    Too much heat    Intense emotion, such as fear    Intense pain    The sight of blood or a needle    Exercising for a long time  Older adults may have additional  triggers, such as:    Urinating    Swallowing    Coughing    Having a bowel movement  Symptoms of vasovagal syncope  Fainting is the main symptom of vasovagal syncope. You may have symptoms before fainting such as:    Nausea    Warm, flushed feeling    Face that turns pale    Sweaty palms    Feeling dizzy    Blurred vision  If you lie down at the first sign of these symptoms, you will often be able to prevent fainting. Not everyone notices symptoms before fainting, however.  When a person does faint, lying down restores blood flow to the brain. Consciousness should return fairly quickly. You might not feel normal for a little while after you faint. You might feel depressed or fatigued for a short time.  Some people have only 1 or 2 episodes of vasovagal syncope in their life. For others, it happens more often and with no warning.  Diagnosing vasovagal syncope  Your doctor will ask about your health history and your symptoms. He or she will give you a physical exam. Your blood pressure may be measured while lying down and while standing. You may also have an electrocardiogram (ECG). This is a simple test that looks at the heart s rhythm.  You may be checked for other possible causes of fainting. You may have other tests such as:    Continuous portable ECG monitoring, to look at heart rhythms over time    Echocardiogram, to look at the blood flow in the heart and the heart s motion    Exercise stress testing, to see how your heart does during exercise    Blood tests to check for signs of disease  If these tests are normal, you may have a tilt table test. For this test, you lie down on a platform. Your heart rate and blood pressure are measured while you are lying down. The platform is then tilted upright. Your heart rate and blood pressure are measured again. If you have vasovagal syncope, you may faint during the upward tilt.  Date Last Reviewed: 6/19/2015 2000-2017 [employer]. [address] [place] [address]  Road, [place] [address]. All rights reserved. This information is not intended as a substitute for professional medical care. Always follow your healthcare professional's instructions.                Follow-ups after your visit        Your next 10 appointments already scheduled     Jul 03, 2017  2:40 PM CDT   JOSE LUIS Spine with Cristian Crane, PT   [facility] ([facility]  )    [address]  [facility] [address]   [phone]            Jul 10, 2017  5:20 PM CDT   JOSE LUIS Spine with Cristian Crane, PT   [facility] ([facility]  )    [address]  [facility] [address] [phone]            Jul 18, 2017  5:20 PM CDT   JOSE LUIS Spine with Cristian Crane, PT   [facility] ([facility]  )    [address]  [facility] [address]   [phone]              Future tests that were ordered for you today     Open Future Orders        Priority Expected Expires Ordered    Albumin Random Urine Quantitative Routine 10/3/2017 7/3/2018 7/3/2017            Who to contact     If you have questions or need follow up information about today's clinic visit or your schedule please contact [facility] directly at [phone].  Normal or non-critical lab and imaging results will be communicated to you by dineouthart, letter or phone within 4 business days after the clinic has received the results. If you do not hear from us within 7 days, please contact the clinic through dineouthart or phone. If you have a critical or abnormal lab result, we will notify you by phone as soon as possible.  Submit refill requests through [email or web address] or call your pharmacy and they will forward the refill request to us. Please allow 3 business days for your refill to be completed.          Additional Information About Your Visit        [email or web address] Information     [email or web address] gives you secure access to your electronic health  record. If you see a primary care provider, you can also send messages to your care team and make appointments. If you have questions, please call your primary care clinic.  If you do not have a primary care provider, please call [phone] and they will assist you.      [employer] is an electronic gateway that provides easy, online access to your medical records. With [employer], you can request a clinic appointment, read your test results, renew a prescription or communicate with your care team.     To access your existing account, please contact your [facility] or call [phone] for assistance.        Care EveryWhere ID     This is your Care EveryWhere ID. This could be used by other organizations to access your [place] medical records  [id number]        Your Vitals Were     Pulse Temperature Pulse Oximetry BMI (Body Mass Index)          89 97.3  F (36.3  C) (Temporal) 99% 48 kg/m2         Blood Pressure from Last 3 Encounters:   07/03/17 130/86   06/19/17 120/70   01/10/17 115/60    Weight from Last 3 Encounters:   07/03/17 (!) 349 lb (158.3 kg)   06/19/17 (!) 353 lb 1.6 oz (160.2 kg)   01/10/17 (!) 339 lb 8 oz (154 kg)              We Performed the Following     CBC with platelets     Vitamin D Deficiency        Primary Care Provider Office Phone # Fax #    Natali Tucker [phone]       [facility]  MD        Equal Access to Services     [facility] CenterSCOTTIE : Hadii ivonne ku hadasho Soangellaali, waaxda luqadaha, qaybta kaalmada adechanyada, anamaria parnell. So [facility] [phone].    ATENCIÓN: Si habla español, tiene a chao disposición servicios gratuitos de asistencia lingüística. Llame al [phone].    We comply with applicable federal civil rights laws and [place] laws. We do not discriminate on the basis of race, color, national origin, age, disability sex, sexual orientation or gender identity.            Thank you!     Thank you  for choosing [facility]  for your care. Our goal is always to provide you with excellent care. Hearing back from our patients is one way we can continue to improve our services. Please take a few minutes to complete the written survey that you may receive in the mail after your visit with us. Thank you!             Your Updated Medication List - Protect others around you: Learn how to safely use, store and throw away your medicines at www.disposemymeds.org.          This list is accurate as of: 7/3/17 12:22 PM.  Always use your most recent med list.                   Brand Name Dispense Instructions for use Diagnosis    aspirin 81 MG tablet     30 tablet    Take 1 tablet by mouth daily.    Hypertension goal BP (blood pressure) < 140/90       hydrochlorothiazide 25 MG tablet    HYDRODIURIL    90 tablet    Take 1 tablet (25 mg) by mouth daily    Hypertension goal BP (blood pressure) < 140/90       lisinopril 40 MG tablet    PRINIVIL/ZESTRIL    90 tablet    Take 1 tablet (40 mg) by mouth daily    Hypertension goal BP (blood pressure) < 140/90       tiZANidine 4 MG tablet    ZANAFLEX    30 tablet    Take 1 tablet (4 mg) by mouth 3 times daily as needed for muscle spasms    Neck muscle spasm

## 2017-07-03 NOTE — PROGRESS NOTES
Subjective:    Patient is a 36 year old male presenting with rehab cervical spine hpi.   Armando Domingo is a 36 year old male with a cervical spine condition.  Condition occurred with:  Insidious onset.  Condition occurred: for unknown reasons.  This is a chronic condition  2-3 months it has been prominent. 6/20/17.    Site of Pain: lower cervical spine, subocciptal.  Radiates to:  None.  Pain is described as aching and is intermittent and reported as 3/10.  Associated symptoms:  Loss of motion/stiffness and headache (feels light headed). Pain is the same all the time.  Exacerbated by: sitting in same position, picking up things off the ground. Relieved by: massage the base of his head.   Since onset symptoms are gradually worsening.  Special tests:  X-ray (c4 anteriorlisthesis on C5 Trace).      General health as reported by patient is fair.  Pertinent medical history includes:  Overweight and high blood pressure.  Medical allergies: no.  Other surgeries include:  No.  Current medications:  High blood pressure medication.  Current occupation is .  Patient is working in normal job without restrictions.  Primary job tasks include:  Prolonged sitting (computer work).    Barriers include:  None as reported by the patient.    Red flags:  None as reported by the patient.                        Objective:    Standing Alignment:    Cervical/Thoracic:  Forward head and thoracic kyphosis increased  Shoulder/UE:  Rounded shoulders                                  Cervical/Thoracic Evaluation    Headaches: cervical  Cervical Myotomes:  normal                      Cervical Dermatomes:  not assessed (no reports of radicular symptoms)                    Cervical Palpation:    Tenderness present at Left:    Suboccipitals  Tenderness not present at Left:   Upper Trap; Levator or Erector Spinae  Tenderness present at Right:    Suboccipitals  Tenderness not present at Right:      Upper Trap; Levator or Erector  Spinae    Cervical Stability/Joint Clearing:      Left negative at: TLA LAT or VAT    Right negative at:  TLA LAT or VAT  Negative:ALAR Ligament and TLA AP                                              Jannet Cervical Evaluation    Posture:  Sitting: fair  Standing: fair  Protruding Head: yes  Wry Neck: no  Correction of Posture: no effect    Movement Loss:  Protrusion (PRO): nil  Flexion (Flex): nil  Retraction (RET): min  Extension (EXT): mod and pain  Lateral Flexion Right (LF R): nil  Lateral Flexion Left (LF L): nil  Rotation Right (ROT R): min  Rotation Left (ROT L): min  Test Movements:  Pretest Pain Sittin/10 subocciptal pain.     RET: During: increases  After: better    Repeat RET: During: decreases  After: better    RET EXT: During: increases  After: no effect  Mechanical Response: no effect  Repeat RET EXT: During: increases  After: worse                          Conclusion: other and derangement  Principle of Treatment:  Posture Correction: seated and sleeping at night posture correction, desk ergonomics    Extension: repeated retraction 1 x 10 reps every 2 hours.                                              ROS    Assessment/Plan:      Patient is a 36 year old male with cervical complaints.    Patient has the following significant findings with corresponding treatment plan.                Diagnosis 1:  Cervical pain  Pain -  hot/cold therapy, manual therapy, self management, education, directional preference exercise and home program  Decreased ROM/flexibility - manual therapy, therapeutic exercise, therapeutic activity and home program  Decreased function - therapeutic activities and home program  Impaired posture - neuro re-education, therapeutic activities and home program    Therapy Evaluation Codes:   1) History comprised of:   Personal factors that impact the plan of care:      Profession.    Comorbidity factors that impact the plan of care are:      High blood pressure and Overweight.      Medications impacting care: High blood pressure.  2) Examination of Body Systems comprised of:   Body structures and functions that impact the plan of care:      Cervical spine.   Activity limitations that impact the plan of care are:      Reading/Computer work, Sitting and Working.  3) Clinical presentation characteristics are:   Stable/Uncomplicated.  4) Decision-Making    Low complexity using standardized patient assessment instrument and/or measureable assessment of functional outcome.  Cumulative Therapy Evaluation is: Low complexity.    Previous and current functional limitations:  (See Goal Flow Sheet for this information)    Short term and Long term goals: (See Goal Flow Sheet for this information)     Communication ability:  Patient appears to be able to clearly communicate and understand verbal and written communication and follow directions correctly.  Treatment Explanation - The following has been discussed with the patient:   RX ordered/plan of care  Anticipated outcomes  Possible risks and side effects  This patient would benefit from PT intervention to resume normal activities.   Rehab potential is good.    Frequency:  1 X week, once daily  Duration:  for 6 weeks  Discharge Plan:  Achieve all LTG.  Independent in home treatment program.  Reach maximal therapeutic benefit.    Please refer to the daily flowsheet for treatment today, total treatment time and time spent performing 1:1 timed codes.

## 2017-07-03 NOTE — NURSING NOTE
"Chief Complaint   Patient presents with     black out       Initial /86 (Cuff Size: Adult Large)  Pulse 89  Temp 97.3  F (36.3  C) (Temporal)  Wt (!) 349 lb (158.3 kg)  SpO2 99%  BMI 48 kg/m2 Estimated body mass index is 48 kg/(m^2) as calculated from the following:    Height as of 1/10/17: 5' 11.5\" (1.816 m).    Weight as of this encounter: 349 lb (158.3 kg).  Medication Reconciliation: complete    "

## 2017-07-03 NOTE — PROGRESS NOTES
"  SUBJECTIVE:                                                    Armando Domingo is a 36 year old male who presents to clinic today for the following health issues:    Patient with history of HTN and obesity came to evaluate one episode of \"black out\".     He and his friends sitting by a table by the pool outdoors, was talking and drinking. He was on the boat for 4 + hours before this. A hot day. Had a \"Black out\" 5 days ago, lasted about 4 seconds, was playing cards and drinking alcohol (4-5 drinks) Rum and coke, and Blacked out. Had told a joke and laughing real hard, then blacked out.     He fell back in his chair and a few seconds later, came forward with his eyes open.   What he remembered feeling a girgling sound like a snort in the throat, and then things were spinning, when he came to it, for a second he didn't know where he was. Then he started recognizing people. Denies loss of bowel or bladder control. No arms jerking. Denies palpitation or chest pain or other prodrome. Reports a couple of other times when he laughed really hard, he felt funny in his head, no prior syncope.     Muscle relaxer Wednesday night, but didn't drink alcohol.     He drinks rum 6-7 drinks almost every day. Sometimes he does Vodka or other shots.     Had abdominal CT in 2015, had hepatic steatosis on CT.       Current Outpatient Prescriptions on File Prior to Visit:  tiZANidine (ZANAFLEX) 4 MG tablet Take 1 tablet (4 mg) by mouth 3 times daily as needed for muscle spasms   lisinopril (PRINIVIL/ZESTRIL) 40 MG tablet Take 1 tablet (40 mg) by mouth daily   hydrochlorothiazide (HYDRODIURIL) 25 MG tablet Take 1 tablet (25 mg) by mouth daily   aspirin 81 MG tablet Take 1 tablet by mouth daily.     No current facility-administered medications on file prior to visit.       Problem list, Medication list, Allergies, and Medical/Social/Surgical histories reviewed in Psychiatric and updated as appropriate.    OBJECTIVE:                                 "                    /86 (Cuff Size: Adult Large)  Pulse 89  Temp 97.3  F (36.3  C) (Temporal)  Wt (!) 349 lb (158.3 kg)  SpO2 99%  BMI 48 kg/m2    GEN: healthy, alert and no distress  HEENT: unremarkable.  Neck:     supple, no thyromegaly, no cervical lymphadenopathy.   ANGI:  CTA B/L, no wheezing or crackles.  CV:  RRR no murmur.  Intact distal pulses, good cap refill.  Abd: soft, normal active bowel sounds, non tender, non distended. No mass or organomegaly.   Ext:  no cyanosis, clubbing or edema.         Diagnostic test results:  Results for orders placed or performed in visit on 07/03/17 (from the past 24 hour(s))   CBC with platelets   Result Value Ref Range    WBC 8.6 4.0 - 11.0 10e9/L    RBC Count 5.62 4.4 - 5.9 10e12/L    Hemoglobin 16.6 13.3 - 17.7 g/dL    Hematocrit 49.5 40.0 - 53.0 %    MCV 88 78 - 100 fl    MCH 29.5 26.5 - 33.0 pg    MCHC 33.5 31.5 - 36.5 g/dL    RDW 12.9 10.0 - 15.0 %    Platelet Count 268 150 - 450 10e9/L          ASSESSMENT/PLAN:                                                      36 year old male with the following diagnoses and treatment plan:      ICD-10-CM    1. Vasovagal syncope R55    2. Positive for microalbuminuria R80.9 Albumin Random Urine Quantitative   3. Alcohol dependence, continuous (H) F10.20 CBC with platelets   4. Hepatic steatosis K76.0    5. Screening for endocrine disorder Z13.29 Vitamin D Deficiency   6. Hypertension goal BP (blood pressure) < 140/90 I10        -- discussed nature of vasovagal syncope.  -- advised cutting down on alcohol use. Discussed weight loss, exercise  -- microalbuminuria: first time.   -- pt is motivated to lose weight, he and his wife will try to cut down on alcohol. Recommended Weight watcher, and several diet plans. Will return in 3 months for recheck.       Will call or return to clinic if worsening or symptoms not improving as discussed.  See Patient Instructions    A total of 25 minutes was spent face to face with this  patient. More than 50% of the time was spent on education for the above problems and management plans.       Natali Tucker MD-PhD  [facility]    (Note: Chart documentation was done in part with Dragon Voice Recognition software. Although reviewed after completion, some word and grammatical errors may remain.)

## 2017-07-05 LAB — DEPRECATED CALCIDIOL+CALCIFEROL SERPL-MC: 23 UG/L (ref 20–75)

## 2017-07-05 NOTE — PROGRESS NOTES
Dear Armando,   Here are your recent results which are within the expected range. Please continue with your current plan of care.     Please call or Mychart to our office if you have further questions.     Natali Tucker MD

## 2017-07-06 NOTE — PROGRESS NOTES
Yulisa Domingo,    Attached are your test results.  -Liver and gallbladder tests (ALT,AST, Alk phos,bilirubin) are normal.  -Kidney function (GFR) is normal.  -Sodium is normal.  -Potassium is normal.  -Glucose is slight elevated and may be sign of early diabetes (prediabetes). ADVISE:: low carbohydrate diet, exercise, try to lose weight (if necessary) and recheck glucose in 12 months. (GLU,A1C, DX: prediabetes)  -LDL(bad) cholesterol level is elevated,  A diet high in fat and simple carbohydrates, genetics and being overweight can contribute to this. ADVISE: Exercise, a low fat, low carbohydrate diet and weight control are helpful to improve this.  Rechecking your fasting cholesterol panel in 12 months is recommended (Lipid w/ LDL reflex, DX:hyperlipidemia)  -TSH (thyroid stimulating hormone) level is normal which indicates normal thyroid function.  -Microalbumin (urine protein) level is elevated. This is suggestive of early damage to your kidneys from high blood pressure.  ADVISE: avoiding anti-inflamatory agents such as ibuprofen (Advil, Motrin) or naproxen (Aleve) as much as possible, keeping your blood pressure in a normal range, and rechecking your microalbumin level in 3 months (microalbumin; DX: Microalbuminuria)   Please contact us if you have any questions.    Kami Ramey, CNP

## 2017-07-10 ENCOUNTER — THERAPY VISIT (OUTPATIENT)
Dept: PHYSICAL THERAPY | Facility: CLINIC | Age: 37
End: 2017-07-10
Payer: COMMERCIAL

## 2017-07-10 DIAGNOSIS — M54.2 CERVICALGIA: ICD-10-CM

## 2017-07-10 PROCEDURE — 97110 THERAPEUTIC EXERCISES: CPT | Mod: GP | Performed by: PHYSICAL THERAPIST

## 2017-07-10 PROCEDURE — 97140 MANUAL THERAPY 1/> REGIONS: CPT | Mod: GP | Performed by: PHYSICAL THERAPIST

## 2017-07-18 ENCOUNTER — THERAPY VISIT (OUTPATIENT)
Dept: PHYSICAL THERAPY | Facility: CLINIC | Age: 37
End: 2017-07-18
Payer: COMMERCIAL

## 2017-07-18 DIAGNOSIS — M54.2 CERVICALGIA: ICD-10-CM

## 2017-07-18 PROCEDURE — 97110 THERAPEUTIC EXERCISES: CPT | Mod: GP | Performed by: PHYSICAL THERAPIST

## 2017-07-18 PROCEDURE — 97140 MANUAL THERAPY 1/> REGIONS: CPT | Mod: GP | Performed by: PHYSICAL THERAPIST

## 2017-07-25 ENCOUNTER — THERAPY VISIT (OUTPATIENT)
Dept: PHYSICAL THERAPY | Facility: CLINIC | Age: 37
End: 2017-07-25
Payer: COMMERCIAL

## 2017-07-25 DIAGNOSIS — M54.2 CERVICALGIA: ICD-10-CM

## 2017-07-25 PROCEDURE — 97140 MANUAL THERAPY 1/> REGIONS: CPT | Mod: GP | Performed by: PHYSICAL THERAPIST

## 2017-07-25 PROCEDURE — 97110 THERAPEUTIC EXERCISES: CPT | Mod: GP | Performed by: PHYSICAL THERAPIST

## 2017-08-10 ENCOUNTER — OFFICE VISIT (OUTPATIENT)
Dept: FAMILY MEDICINE | Facility: CLINIC | Age: 37
End: 2017-08-10
Payer: COMMERCIAL

## 2017-08-10 VITALS
HEART RATE: 86 BPM | TEMPERATURE: 97.8 F | SYSTOLIC BLOOD PRESSURE: 118 MMHG | HEIGHT: 72 IN | WEIGHT: 315 LBS | BODY MASS INDEX: 42.66 KG/M2 | RESPIRATION RATE: 18 BRPM | DIASTOLIC BLOOD PRESSURE: 74 MMHG | OXYGEN SATURATION: 97 %

## 2017-08-10 DIAGNOSIS — E66.01 MORBID OBESITY DUE TO EXCESS CALORIES (H): ICD-10-CM

## 2017-08-10 DIAGNOSIS — K29.00 OTHER ACUTE GASTRITIS: Primary | ICD-10-CM

## 2017-08-10 DIAGNOSIS — F10.20 ALCOHOL DEPENDENCE, CONTINUOUS (H): ICD-10-CM

## 2017-08-10 LAB
BASOPHILS # BLD AUTO: 0 10E9/L (ref 0–0.2)
BASOPHILS NFR BLD AUTO: 0.1 %
DIFFERENTIAL METHOD BLD: NORMAL
EOSINOPHIL # BLD AUTO: 0.3 10E9/L (ref 0–0.7)
EOSINOPHIL NFR BLD AUTO: 3 %
ERYTHROCYTE [DISTWIDTH] IN BLOOD BY AUTOMATED COUNT: 13 % (ref 10–15)
HCT VFR BLD AUTO: 46.2 % (ref 40–53)
HGB BLD-MCNC: 15.4 G/DL (ref 13.3–17.7)
LIPASE SERPL-CCNC: 150 U/L (ref 73–393)
LYMPHOCYTES # BLD AUTO: 2 10E9/L (ref 0.8–5.3)
LYMPHOCYTES NFR BLD AUTO: 19.3 %
MCH RBC QN AUTO: 28.8 PG (ref 26.5–33)
MCHC RBC AUTO-ENTMCNC: 33.3 G/DL (ref 31.5–36.5)
MCV RBC AUTO: 87 FL (ref 78–100)
MONOCYTES # BLD AUTO: 0.8 10E9/L (ref 0–1.3)
MONOCYTES NFR BLD AUTO: 7.7 %
NEUTROPHILS # BLD AUTO: 7.1 10E9/L (ref 1.6–8.3)
NEUTROPHILS NFR BLD AUTO: 69.9 %
PLATELET # BLD AUTO: 275 10E9/L (ref 150–450)
RBC # BLD AUTO: 5.34 10E12/L (ref 4.4–5.9)
WBC # BLD AUTO: 10.2 10E9/L (ref 4–11)

## 2017-08-10 PROCEDURE — 85025 COMPLETE CBC W/AUTO DIFF WBC: CPT | Performed by: PHYSICIAN ASSISTANT

## 2017-08-10 PROCEDURE — 99214 OFFICE O/P EST MOD 30 MIN: CPT | Performed by: PHYSICIAN ASSISTANT

## 2017-08-10 PROCEDURE — 36415 COLL VENOUS BLD VENIPUNCTURE: CPT | Performed by: PHYSICIAN ASSISTANT

## 2017-08-10 PROCEDURE — 83690 ASSAY OF LIPASE: CPT | Performed by: PHYSICIAN ASSISTANT

## 2017-08-10 ASSESSMENT — PAIN SCALES - GENERAL: PAINLEVEL: NO PAIN (1)

## 2017-08-10 NOTE — NURSING NOTE
"Chief Complaint   Patient presents with     Abdominal Pain       Initial /74 (BP Location: Right arm, Patient Position: Chair, Cuff Size: Adult Large)  Pulse 86  Temp 97.8  F (36.6  C) (Oral)  Resp 18  Ht 1.816 m (5' 11.5\")  Wt (!) 159.7 kg (352 lb)  SpO2 97%  BMI 48.41 kg/m2 Estimated body mass index is 48.41 kg/(m^2) as calculated from the following:    Height as of this encounter: 1.816 m (5' 11.5\").    Weight as of this encounter: 159.7 kg (352 lb).  Medication Reconciliation: complete     Sharon Gómez MA       "

## 2017-08-10 NOTE — PROGRESS NOTES
Yulisa Roberts  Your pancreas function was normal.   I do recommend the omeprazole and follow up with GI if no improvement over the next 2 weeks.   Follow up with primary in approximately 8 weeks if symptoms improve with the omeprazole.   Please call or MyChart my office with any questions or concerns.    Anabella Mancia, PAC

## 2017-08-10 NOTE — PROGRESS NOTES
SUBJECTIVE:                                                    Armando Domingo is a 37 year old male who presents to clinic today for the following health issues:      ABDOMINAL   PAIN     Onset: yesterday    Description:   Character: Sharp  Location: left upper quadrant  Radiation: None    Intensity: mild, moderate    Progression of Symptoms:  improving    Accompanying Signs & Symptoms:  Fever/Chills?: no   Gas/Bloating: no   Nausea: no   Vomitting: no   Diarrhea?: no   Constipation:no   Dysuria or Hematuria: no    History:   Trauma: no   Previous similar pain: no    Previous tests done: none    Precipitating factors:   Does the pain change with:     Food: no      BM: no     Urination: no     Alleviating factors:  none    Therapies Tried and outcome: none    LMP:  not applicable       Rates pain approximately 4-5 / 10 one hour ago but now dramatically improved.   Has recurrent heartburn more than couple times a week.  This pain was a bit different.  Sharp pain that felt like a pop then couple seconds later will feel another pop of pain.    Drinks couple times a week a few drinks. Pain has improved.  Took tums approximately 1 1 1/2 hours ago.  Had a couple drinks last night along with pork chop, mashed potatoes and veggies.  No ibuprofen or other nsaids.   No change in bowel movement.  Reports doesn't look at bowel movement but no melena or hematchezia to his knowledge  No history of pancreatitis. No nausea or vomiting. No fever, sweats, chills. No urinary tract infection symptoms. No exertional symptoms. No shortness of breath.   Dad with history of MI age 64    Does computer work    Problem list and histories reviewed & adjusted, as indicated.  Additional history: as documented    Patient Active Problem List   Diagnosis     Hypertension goal BP (blood pressure) < 140/90     Hyperlipidemia LDL goal <130     Morbid obesity (H)     Hepatic steatosis     Alcohol dependence, continuous (H)     Cervicalgia     Past  "Surgical History:   Procedure Laterality Date     SOFT TISSUE SURGERY      cyst removal       Social History   Substance Use Topics     Smoking status: Former Smoker     Packs/day: 0.10     Years: 15.00     Types: Cigarettes     Start date: 1/1/1996     Quit date: 1/1/2013     Smokeless tobacco: Never Used     Alcohol use 24.0 oz/week     40 Standard drinks or equivalent per week      Comment: advised cutting down 7/3/17     Family History   Problem Relation Age of Onset     DIABETES Mother      Hypertension Mother      Cancer - colorectal Maternal Grandfather      DIABETES Paternal Grandmother      CEREBROVASCULAR DISEASE Paternal Grandmother      C.A.D. Father      Asthma No family hx of      Breast Cancer No family hx of      Prostate Cancer No family hx of      Thyroid Disease No family hx of      Lipids No family hx of          Current Outpatient Prescriptions   Medication Sig Dispense Refill            lisinopril (PRINIVIL/ZESTRIL) 40 MG tablet Take 1 tablet (40 mg) by mouth daily 90 tablet 1     hydrochlorothiazide (HYDRODIURIL) 25 MG tablet Take 1 tablet (25 mg) by mouth daily 90 tablet 1     aspirin 81 MG tablet Take 1 tablet by mouth daily. 30 tablet 3         Reviewed and updated as needed this visit by clinical staff     Reviewed and updated as needed this visit by Provider         ROS:  Constitutional, HEENT, cardiovascular, pulmonary, gi and gu systems are negative, except as otherwise noted.      OBJECTIVE:   /74 (BP Location: Right arm, Patient Position: Chair, Cuff Size: Adult Large)  Pulse 86  Temp 97.8  F (36.6  C) (Oral)  Resp 18  Ht 1.816 m (5' 11.5\")  Wt (!) 159.7 kg (352 lb)  SpO2 97%  BMI 48.41 kg/m2  Body mass index is 48.41 kg/(m^2).  GENERAL: alert, no distress and obese  NECK: no adenopathy, no asymmetry, masses, or scars and thyroid normal to palpation  RESP: lungs clear to auscultation - no rales, rhonchi or wheezes  CV: regular rate and rhythm, normal S1 S2, no S3 or S4, " "no murmur, click or rub, no peripheral edema and peripheral pulses strong  ABDOMEN: soft, nontender, no hepatosplenomegaly, no masses and bowel sounds normal  MS: no gross musculoskeletal defects noted, no edema  PSYCH: mentation appears normal, affect normal/bright    Diagnostic Test Results:  Results for orders placed or performed in visit on 08/10/17 (from the past 24 hour(s))   CBC with platelets differential   Result Value Ref Range    WBC 10.2 4.0 - 11.0 10e9/L    RBC Count 5.34 4.4 - 5.9 10e12/L    Hemoglobin 15.4 13.3 - 17.7 g/dL    Hematocrit 46.2 40.0 - 53.0 %    MCV 87 78 - 100 fl    MCH 28.8 26.5 - 33.0 pg    MCHC 33.3 31.5 - 36.5 g/dL    RDW 13.0 10.0 - 15.0 %    Platelet Count 275 150 - 450 10e9/L    Diff Method Automated Method     % Neutrophils 69.9 %    % Lymphocytes 19.3 %    % Monocytes 7.7 %    % Eosinophils 3.0 %    % Basophils 0.1 %    Absolute Neutrophil 7.1 1.6 - 8.3 10e9/L    Absolute Lymphocytes 2.0 0.8 - 5.3 10e9/L    Absolute Monocytes 0.8 0.0 - 1.3 10e9/L    Absolute Eosinophils 0.3 0.0 - 0.7 10e9/L    Absolute Basophils 0.0 0.0 - 0.2 10e9/L       ASSESSMENT/PLAN:         BMI:   Estimated body mass index is 48.41 kg/(m^2) as calculated from the following:    Height as of this encounter: 1.816 m (5' 11.5\").    Weight as of this encounter: 159.7 kg (352 lb).   Weight management plan: Discussed healthy diet and exercise guidelines and patient will follow up in 1 month in clinic to re-evaluate.        1. Other acute gastritis  Will check cbc and lipase to rule out pancreatitis. Had comprehensive metabolic panel approximately one month ago and doesn't want to repeat tests.   Encouraged to avoid alcohol and work on weight loss and avoid ibuprofen.  Will start omeprazole and if no improvement follow up with GI for endoscopy and follow up with them- omeprazole (PRILOSEC) 20 MG CR capsule; Take 1 capsule (20 mg) by mouth daily  Dispense: 30 capsule; Refill: 1  - CBC with platelets differential  - " Lipase  - GASTROENTEROLOGY ADULT REF PROCEDURE ONLY  - GASTROENTEROLOGY ADULT REF CONSULT ONLY    2. Morbid obesity due to excess calories (H)  Encouraged dietary changes and weight loss.  Advised obesity contributes to gastritis     3. Alcohol dependence, continuous (H)  Encouraged to avoid alcohol - likely contributing to gastritis.       Patient Instructions   Take prilosec 30 minutes before meal in AM  If no improvement with omeprazole in 2 weeks schedule endoscopy and follow up with [facility]  Return urgently if any change in symptoms like nausea, vomiting, pain with exertion, shortness of breath or other change in symptoms.        Anabella Mancia PA-C  [facility]

## 2017-08-10 NOTE — MR AVS SNAPSHOT
After Visit Summary   8/10/2017    Armando Dmoingo    MRN: [id number]           Patient Information     Date Of Birth          1980        Visit Information        Provider Department      8/10/2017 9:00 AM Anabella Mancia PA-C [facility]        Today's Diagnoses     Other acute gastritis    -  1      Care Instructions    Take prilosec 30 minutes before meal in AM  If no improvement with omeprazole in 2 weeks schedule endoscopy and follow up with [facility]  Return urgently if any change in symptoms like nausea, vomiting, pain with exertion, shortness of breath or other change in symptoms.           Follow-ups after your visit        Additional Services     GASTROENTEROLOGY ADULT REF CONSULT ONLY       Preferred Location: [facility] [phone]      Please be aware that coverage of these services is subject to the terms and limitations of your health insurance plan.  Call member services at your health plan with any benefit or coverage questions.  Any procedures must be performed at a [facility] facility OR coordinated by your clinic's referral office.    Please bring the following with you to your appointment:    (1) Any X-Rays, CTs or MRIs which have been performed.  Contact the facility where they were done to arrange for  prior to your scheduled appointment.    (2) List of current medications   (3) This referral request   (4) Any documents/labs given to you for this referral            GASTROENTEROLOGY ADULT REF PROCEDURE ONLY       Last Lab Result: Creatinine (mg/dL)       Date                     Value                 07/03/2017               1.09             ----------  Body mass index is 48.41 kg/(m^2).     Needed:  No  Language:  English    Patient will be contacted to schedule procedure.     Please be aware that coverage of these services is subject to the terms and limitations of your health insurance plan.  Call member services at your health  plan with any benefit or coverage questions.  Any procedures must be performed at a [facility] OR coordinated by your clinic's referral office.    Please bring the following with you to your appointment:    (1) Any X-Rays, CTs or MRIs which have been performed.  Contact the facility where they were done to arrange for  prior to your scheduled appointment.    (2) List of current medications   (3) This referral request   (4) Any documents/labs given to you for this referral                  Your next 10 appointments already scheduled     Aug 11, 2017  4:50 PM CDT   [facility] Spine with Cristian Crane, PT   [facility] ([facility]  )    [address] [place] [address] [phone]            Oct 05, 2017  6:10 PM CDT   LAB with LAB FIRST FLOOR [facility] ([facility])    [address] [place] [address] [phone]           Patient must bring picture ID. Patient should be prepared to give a urine specimen  Please do not eat 10-12 hours before your appointment if you are coming in fasting for labs on lipids, cholesterol, or glucose (sugar). Pregnant women should follow their Care Team instructions. Water with medications is okay. Do not drink coffee or other fluids. If you have concerns about taking  your medications, please ask at office or if scheduling via [facility], send a message by clicking on Secure Messaging, Message Your Care Team.            Oct 05, 2017  6:30 PM CDT   Return Visit with Natali Tucker MD   [facility] ([facility])    [address] [place] [address] [phone]              Who to contact     If you have questions or need follow up information about today's clinic visit or your schedule please contact [facility] directly at [phone].  Normal or non-critical lab and imaging results will be communicated to you  "by [employer], letter or phone within 4 business days after the clinic has received the results. If you do not hear from us within 7 days, please contact the clinic through [employer] or phone. If you have a critical or abnormal lab result, we will notify you by phone as soon as possible.  Submit refill requests through [employer] or call your pharmacy and they will forward the refill request to us. Please allow 3 business days for your refill to be completed.          Additional Information About Your Visit        [employer] Information     [employer] gives you secure access to your electronic health record. If you see a primary care provider, you can also send messages to your care team and make appointments. If you have questions, please call your primary care clinic.  If you do not have a primary care provider, please call [phone] and they will assist you.        Care EveryWhere ID     This is your Care EveryWhere ID. This could be used by other organizations to access your [facility] medical records  [id number]        Your Vitals Were     Pulse Temperature Respirations Height Pulse Oximetry BMI (Body Mass Index)    86 97.8  F (36.6  C) (Oral) 18 1.816 m (5' 11.5\") 97% 48.41 kg/m2       Blood Pressure from Last 3 Encounters:   08/10/17 118/74   07/03/17 130/86   06/19/17 120/70    Weight from Last 3 Encounters:   08/10/17 (!) 159.7 kg (352 lb)   07/03/17 (!) 158.3 kg (349 lb)   06/19/17 (!) 160.2 kg (353 lb 1.6 oz)              We Performed the Following     CBC with platelets differential     GASTROENTEROLOGY ADULT REF CONSULT ONLY     GASTROENTEROLOGY ADULT REF PROCEDURE ONLY     Lipase          Today's Medication Changes          These changes are accurate as of: 8/10/17  9:14 AM.  If you have any questions, ask your nurse or doctor.               Start taking these medicines.        Dose/Directions    omeprazole 20 MG CR capsule   Commonly known as:  priLOSEC   Used for:  Other acute gastritis   Started by:  Blanche, " Anabella PUGH PA-C        Dose:  20 mg   Take 1 capsule (20 mg) by mouth daily   Quantity:  30 capsule   Refills:  1            Where to get your medicines      These medications were sent to [facility] [address] [facility] [address] [place] [address]  [address] [address] [address]     Phone:  [phone]     omeprazole 20 MG CR capsule                Primary Care Provider Office Phone # Fax #    Natali Tucker [phone]       MD        Equal Access to Services     [facility]: Hadii ivonne ku hadasho Soomaali, waaxda luqadaha, qaybta kaalmada adeegyada, anamaria cardona . So [facility] [phone].    ATENCIÓN: Si habla español, tiene a chao disposición servicios gratuitos de asistencia lingüística. [facility] [phone].    We comply with applicable federal civil rights laws and [place] laws. We do not discriminate on the basis of race, color, national origin, age, disability sex, sexual orientation or gender identity.            Thank you!     Thank you for choosing [facility]  for your care. Our goal is always to provide you with excellent care. Hearing back from our patients is one way we can continue to improve our services. Please take a few minutes to complete the written survey that you may receive in the mail after your visit with us. Thank you!             Your Updated Medication List - Protect others around you: Learn how to safely use, store and throw away your medicines at www.disposemymeds.org.          This list is accurate as of: 8/10/17  9:14 AM.  Always use your most recent med list.                   Brand Name Dispense Instructions for use Diagnosis    aspirin 81 MG tablet     30 tablet    Take 1 tablet by mouth daily.    Hypertension goal BP (blood pressure) < 140/90       hydrochlorothiazide 25 MG tablet    HYDRODIURIL    90 tablet    Take 1 tablet (25 mg) by mouth daily    Hypertension goal BP (blood pressure) < 140/90        lisinopril 40 MG tablet    PRINIVIL/ZESTRIL    90 tablet    Take 1 tablet (40 mg) by mouth daily    Hypertension goal BP (blood pressure) < 140/90       omeprazole 20 MG CR capsule    priLOSEC    30 capsule    Take 1 capsule (20 mg) by mouth daily    Other acute gastritis

## 2017-08-10 NOTE — PROGRESS NOTES
Yulisa Roberts  Your blood counts were normal.   I will notify you of your pancreas function when available.   Please call or MyChart my office with any questions or concerns.    Anabella Mancia, PAC

## 2017-08-10 NOTE — PATIENT INSTRUCTIONS
Take prilosec 30 minutes before meal in AM  If no improvement with omeprazole in 2 weeks schedule endoscopy and follow up with [facility]  Return urgently if any change in symptoms like nausea, vomiting, pain with exertion, shortness of breath or other change in symptoms.

## 2017-10-04 DIAGNOSIS — I10 HYPERTENSION GOAL BP (BLOOD PRESSURE) < 140/90: ICD-10-CM

## 2017-10-05 ENCOUNTER — OFFICE VISIT (OUTPATIENT)
Dept: PEDIATRICS | Facility: CLINIC | Age: 37
End: 2017-10-05
Payer: COMMERCIAL

## 2017-10-05 VITALS
SYSTOLIC BLOOD PRESSURE: 124 MMHG | TEMPERATURE: 97.1 F | BODY MASS INDEX: 47.86 KG/M2 | WEIGHT: 315 LBS | HEART RATE: 97 BPM | DIASTOLIC BLOOD PRESSURE: 86 MMHG | OXYGEN SATURATION: 97 %

## 2017-10-05 DIAGNOSIS — R80.9 POSITIVE FOR MICROALBUMINURIA: ICD-10-CM

## 2017-10-05 DIAGNOSIS — Z23 NEEDS FLU SHOT: ICD-10-CM

## 2017-10-05 DIAGNOSIS — E66.01 MORBID OBESITY (H): Primary | ICD-10-CM

## 2017-10-05 DIAGNOSIS — I10 HYPERTENSION GOAL BP (BLOOD PRESSURE) < 140/90: ICD-10-CM

## 2017-10-05 LAB
CREAT UR-MCNC: 243 MG/DL
MICROALBUMIN UR-MCNC: 9 MG/L
MICROALBUMIN/CREAT UR: 3.89 MG/G CR (ref 0–17)

## 2017-10-05 PROCEDURE — 90686 IIV4 VACC NO PRSV 0.5 ML IM: CPT | Performed by: INTERNAL MEDICINE

## 2017-10-05 PROCEDURE — 99213 OFFICE O/P EST LOW 20 MIN: CPT | Mod: 25 | Performed by: INTERNAL MEDICINE

## 2017-10-05 PROCEDURE — 90471 IMMUNIZATION ADMIN: CPT | Performed by: INTERNAL MEDICINE

## 2017-10-05 PROCEDURE — 82043 UR ALBUMIN QUANTITATIVE: CPT | Performed by: INTERNAL MEDICINE

## 2017-10-05 RX ORDER — LISINOPRIL AND HYDROCHLOROTHIAZIDE 12.5; 2 MG/1; MG/1
2 TABLET ORAL DAILY
Qty: 180 TABLET | Refills: 3 | Status: SHIPPED | OUTPATIENT
Start: 2017-10-05 | End: 2018-05-10 | Stop reason: ALTCHOICE

## 2017-10-05 RX ORDER — HYDROCHLOROTHIAZIDE 25 MG/1
TABLET ORAL
Qty: 90 TABLET | Refills: 1 | Status: SHIPPED | OUTPATIENT
Start: 2017-10-05 | End: 2017-10-05 | Stop reason: ALTCHOICE

## 2017-10-05 RX ORDER — LISINOPRIL 40 MG/1
TABLET ORAL
Qty: 90 TABLET | Refills: 1 | Status: SHIPPED | OUTPATIENT
Start: 2017-10-05 | End: 2017-10-05 | Stop reason: ALTCHOICE

## 2017-10-05 NOTE — NURSING NOTE
"Chief Complaint   Patient presents with     RECHECK     Follow up Syncope       Initial /86  Pulse 97  Temp 97.1  F (36.2  C) (Temporal)  Wt (!) 348 lb (157.9 kg)  SpO2 97%  BMI 47.86 kg/m2 Estimated body mass index is 47.86 kg/(m^2) as calculated from the following:    Height as of 8/10/17: 5' 11.5\" (1.816 m).    Weight as of this encounter: 348 lb (157.9 kg).  Medication Reconciliation: complete      "

## 2017-10-05 NOTE — PROGRESS NOTES
SUBJECTIVE:   Armando Domingo is a 37 year old male who presents to clinic today for the following health issues:      3 month Follow up Syncope    Patient with history of hypertension, hyperlipidemia, morbid obesity, and hepatic steatosis.  He was seen 3 months ago for an episode of syncope.  Diagnosed with vasovagal syncope.  He was advised to cut down on alcohol use, work on weight loss.  He and his wife had cut down alcohol significantly.  He only drinks on weekends now.  He is a little vague about how much he drinks, drinks some beer some hard liquor.  But he is quite proud of himself for cutting down significantly.  He has not lost significant amount of weight.  He feels that the main issue is getting exercise in.  There is through for improvement in diet.    He also had positive microalbuminuria at the last visit.  Had another urine test done, result is not available yet      Current Outpatient Prescriptions on File Prior to Visit:  aspirin 81 MG tablet Take 1 tablet by mouth daily.   omeprazole (PRILOSEC) 20 MG CR capsule Take 1 capsule (20 mg) by mouth daily     No current facility-administered medications on file prior to visit.       Problem list, Medication list, Allergies, and Medical/Social/Surgical histories reviewed in [employer] and updated as appropriate.    OBJECTIVE:                                                    /86  Pulse 97  Temp 97.1  F (36.2  C) (Temporal)  Wt (!) 348 lb (157.9 kg)  SpO2 97%  BMI 47.86 kg/m2    GEN: healthy, alert and no distress       Diagnostic test results:         ASSESSMENT/PLAN:                                                      37 year old male with the following diagnoses and treatment plan:      ICD-10-CM    1. Morbid obesity (H) E66.01 NUTRITION REFERRAL   2. Needs flu shot Z23 HC FLU VAC PRESRV FREE QUAD SPLIT VIR 3+YRS IM     ADMIN 1st VACCINE   3. Hypertension goal BP (blood pressure) < 140/90 I10 lisinopril-hydrochlorothiazide (PRINZIDE/ZESTORETIC)  20-12.5 MG per tablet       We will have him meet with our nutritionist to work on a diet plan.  Refilled his blood pressure medications.  He is currently on lisinopril 40 mg and hydrochlorothiazide 25 mg.  We decided to use a dose equivalent combination pill.    Follow up in clinic in 6 months to follow up on progress on weight.      Will call or return to clinic if worsening or symptoms not improving as discussed.  See Patient Instructions.        Natali Tucker MD-PhD  [facility]    (Note: Chart documentation was done in part with Dragon Voice Recognition software. Although reviewed after completion, some word and grammatical errors may remain.)

## 2017-10-05 NOTE — TELEPHONE ENCOUNTER
hydrochlorothiazide (HYDRODIURIL) 25 MG tablet     Last Written Prescription Date: 1/10/2017  Last Fill Quantity: 90, # refills: 1  [facility] prescribing provider: 7/3/2017  Next 5 appointments (look out 90 days)     Oct 05, 2017  6:30 PM CDT   Return Visit with Natali Tucker MD PhD   [facility] ([facility])    [address] [facility] [id number] [phone]                   Potassium   Date Value Ref Range Status   07/03/2017 4.1 3.4 - 5.3 mmol/L Final     Creatinine   Date Value Ref Range Status   07/03/2017 1.09 0.66 - 1.25 mg/dL Final     BP Readings from Last 3 Encounters:   08/10/17 118/74   07/03/17 130/86   06/19/17 120/70       lisinopril (PRINIVIL/ZESTRIL) 40 MG tablet   Last Written Prescription Date: 1/10/2017  Last Fill Quantity: 90, # refills: 1    [facility] prescribing provider:  7/3/2017   Future Office Visit:    Next 5 appointments (look out 90 days)     Oct 05, 2017  6:30 PM CDT   Return Visit with Natali Tucker MD PhD   [facility] ([facility])    [id number] [facility] [id number] [phone]                    BP Readings from Last 3 Encounters:   08/10/17 118/74   07/03/17 130/86   06/19/17 120/70       Refilled per [facility] protocol.    Vera Shah RN

## 2017-10-05 NOTE — NURSING NOTE
Injectable Influenza Immunization Documentation    1.  Is the person to be vaccinated sick today?  No    2. Does the person to be vaccinated have an allergy to eggs or to a component of the vaccine?  No    3. Has the person to be vaccinated today ever had a serious reaction to influenza vaccine in the past?  No    4. Has the person to be vaccinated ever had Guillain-Roper syndrome?  No     Form completed by Vivian GRIFFIN

## 2017-10-05 NOTE — MR AVS SNAPSHOT
After Visit Summary   10/5/2017    Armando Domingo    MRN: [id number]           Patient Information     Date Of Birth          1980        Visit Information        Provider Department      10/5/2017 6:30 PM Natali Tucker MD PhD [facility]        Today's Diagnoses     Morbid obesity (H)    -  1    Needs flu shot        Hypertension goal BP (blood pressure) < 140/90          Care Instructions    Make appointment(s) for:   -- nutrition referral  -- clinic follow up in 6 months.         Medication(s) prescribed today:    Orders Placed This Encounter   Medications     lisinopril-hydrochlorothiazide (PRINZIDE/ZESTORETIC) 20-12.5 MG per tablet     Sig: Take 2 tablets by mouth daily     Dispense:  180 tablet     Refill:  3                    Follow-ups after your visit        Additional Services     NUTRITION REFERRAL       Your provider has referred you to: [facility]: [facility] [phone]   [email or web address]    Please be aware that coverage of these services is subject to the terms and limitations of your health insurance plan.  Call member services at your health plan with any benefit or coverage questions.      Please bring the following with you to your appointment:    (1) This referral request  (2) Any documents given to you regarding this referral  (3) Any specific questions you have about diet and/or food choices                  Who to contact     If you have questions or need follow up information about today's clinic visit or your schedule please contact [facility] directly at [phone].  Normal or non-critical lab and imaging results will be communicated to you by MyChart, letter or phone within 4 business days after the clinic has received the results. If you do not hear from us within 7 days, please contact the clinic through MyChart or phone. If you have a critical or abnormal lab result, we will  notify you by phone as soon as possible.  Submit refill requests through Emtrics or call your pharmacy and they will forward the refill request to us. Please allow 3 business days for your refill to be completed.          Additional Information About Your Visit        Emtrics Information     Emtrics gives you secure access to your electronic health record. If you see a primary care provider, you can also send messages to your care team and make appointments. If you have questions, please call your primary care clinic.  If you do not have a primary care provider, please call [phone] and they will assist you.      Emtrics is an electronic gateway that provides easy, online access to your medical records. With Emtrics, you can request a clinic appointment, read your test results, renew a prescription or communicate with your care team.     To access your existing account, please contact your [facility] or call [phone] for assistance.        Care EveryWhere ID     This is your Care EveryWhere ID. This could be used by other organizations to access your [place] medical records  [id number]        Your Vitals Were     Pulse Temperature Pulse Oximetry BMI (Body Mass Index)          97 97.1  F (36.2  C) (Temporal) 97% 47.86 kg/m2         Blood Pressure from Last 3 Encounters:   10/05/17 124/86   08/10/17 118/74   07/03/17 130/86    Weight from Last 3 Encounters:   10/05/17 (!) 348 lb (157.9 kg)   08/10/17 (!) 352 lb (159.7 kg)   07/03/17 (!) 349 lb (158.3 kg)              We Performed the Following     ADMIN 1st VACCINE     HC FLU VAC PRESRV FREE QUAD SPLIT VIR 3+YRS IM     NUTRITION REFERRAL          Today's Medication Changes          These changes are accurate as of: 10/5/17  6:36 PM.  If you have any questions, ask your nurse or doctor.               Start taking these medicines.        Dose/Directions    lisinopril-hydrochlorothiazide 20-12.5 MG per tablet   Commonly known as:   PRINZIDE/ZESTORETIC   Used for:  Hypertension goal BP (blood pressure) < 140/90   Started by:  Natali Tucker MD PhD        Dose:  2 tablet   Take 2 tablets by mouth daily   Quantity:  180 tablet   Refills:  3         Stop taking these medicines if you haven't already. Please contact your care team if you have questions.     hydrochlorothiazide 25 MG tablet   Commonly known as:  HYDRODIURIL   Stopped by:  Natali Tucker MD PhD           lisinopril 40 MG tablet   Commonly known as:  PRINIVIL/ZESTRIL   Stopped by:  Natali Tucker MD PhD                Where to get your medicines      These medications were sent to [facility] [address] [facility] [address]     Phone:  [phone]     lisinopril-hydrochlorothiazide 20-12.5 MG per tablet                Primary Care Provider Office Phone # Fax #    Natali Tucker MD PhD [phone]       [address]  [facility] [address]        Equal Access to Services     [facility]: Hadii aad ku hadasho Soomaali, waaxda luqadaha, qaybta kaalmada adeegyada, waxay paulettein haygiselle cardona . So [facility] [phone].    ATENCIÓN: Si habla español, tiene a chao disposición servicios gratuitos de asistencia lingüística. Llame al [phone].    We comply with applicable federal civil rights laws and [place] laws. We do not discriminate on the basis of race, color, national origin, age, disability, sex, sexual orientation, or gender identity.            Thank you!     Thank you for choosing [facility]  for your care. Our goal is always to provide you with excellent care. Hearing back from our patients is one way we can continue to improve our services. Please take a few minutes to complete the written survey that you may receive in the mail after your visit with us. Thank you!             Your Updated Medication List - Protect others around you: Learn how to safely use, store and throw away  your medicines at www.disposemymeds.org.          This list is accurate as of: 10/5/17  6:36 PM.  Always use your most recent med list.                   Brand Name Dispense Instructions for use Diagnosis    aspirin 81 MG tablet     30 tablet    Take 1 tablet by mouth daily.    Hypertension goal BP (blood pressure) < 140/90       lisinopril-hydrochlorothiazide 20-12.5 MG per tablet    PRINZIDE/ZESTORETIC    180 tablet    Take 2 tablets by mouth daily    Hypertension goal BP (blood pressure) < 140/90       omeprazole 20 MG CR capsule    priLOSEC    30 capsule    Take 1 capsule (20 mg) by mouth daily    Other acute gastritis

## 2017-10-06 NOTE — PROGRESS NOTES
Dear Armando,   Here are your recent results.   -- good news, urine protein resolved. So no chronic kidney disease. Work on diet and weight loss.   -- see nutritionist and follow up in 6 months planned.     Please call or Mychart to our office if you have further questions.     Natali Tucker MD-PhD

## 2017-10-16 ENCOUNTER — OFFICE VISIT (OUTPATIENT)
Dept: NUTRITION | Facility: CLINIC | Age: 37
End: 2017-10-16
Attending: INTERNAL MEDICINE
Payer: COMMERCIAL

## 2017-10-16 DIAGNOSIS — E88.810 METABOLIC SYNDROME X: ICD-10-CM

## 2017-10-16 DIAGNOSIS — E66.01 MORBID OBESITY (H): Primary | ICD-10-CM

## 2017-10-16 PROCEDURE — 97802 MEDICAL NUTRITION INDIV IN: CPT | Performed by: DIETITIAN, REGISTERED

## 2017-10-18 NOTE — PROGRESS NOTES
"REPORT OF MEDICAL NUTRITION THERAPY    Patient Name: Armando Domnigo  MRN: [id number],  Age: 37 year old,  Gender: male    Encounter Date: 10/16/2017,  Encounter Time:60 minute Initial    Provider: Diana Carreon, FAWN, LD, CDE    Referral received to provide Medical Nutrition Therapy for patient for treatment of morbid obesity.    NUTRITION HISTORY:    Armando reports he has gained 50-60 pounds over the past 3 years since his job changed.  He presently is working as a  and is at a computer for long hours.  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  Now he has very little activity.    He has not been on a diet for weight loss before.    He has been trying to make some changes in intake, but has had some misconceptions about need to eliminate carbohydrate.  As a result he has included more protein, and has had less than adequate intake of whole carb foods.  He identifies that he eats very large portions of foods.  Snack foods, especially chips have been a problem for him.  He does not like sweets.  Eating out due to busy schedules is also a source of nutrition challenges.    Usual intake consists of:  Breakfast:2 eggs, ham slice  Snack:  Lunch:Chinese, or [employer]  Snack:  Dinner: Varied, out often, balanced at home, like pork chop with apples, wild rice pilaf, broccoli  Snack:    Armando identifies portion sizes of foods and snack foods like chips as problems to work on.  He is very concerned about his risk factors and would like to improve his health.  He seems highly motivated.    He likes to cook and does much of the cooking at home.      DATA:    10/5/17    Estimated body mass index is 47.86 kg/(m^2) as calculated from the following:    Height as of 8/10/17: 5' 11.5\" (1.816 m).    Weight as of this encounter: 348 lb (157.9 kg).    Wt Readings from Last 5 Encounters:   10/05/17 (!) 157.9 kg (348 lb)   08/10/17 (!) 159.7 kg (352 lb)   07/03/17 (!) 158.3 kg (349 lb)   06/19/17 (!) 160.2 " kg (353 lb 1.6 oz)   01/10/17 (!) 154 kg (339 lb 8 oz)   5/10/2010   237 lb      LABS:  Glucose   Date Value Ref Range Status   07/03/2017 105 (H) 70 - 99 mg/dL Final     Comment:     Fasting specimen   ]  Recent Labs   Lab Test  07/03/17   0702  05/09/16   0717  11/19/14   0813  10/02/13   0715   CHOL  185  167  190  174   HDL  41  31*  43  37*   LDL  121*  112*  129  120   TRIG  116  118  89  87   CHOLHDLRATIO   --    --   4.4  4.7     Last Basic Metabolic Panel:  Lab Results   Component Value Date     07/03/2017      Lab Results   Component Value Date    POTASSIUM 4.1 07/03/2017     Lab Results   Component Value Date    CHLORIDE 102 07/03/2017     Lab Results   Component Value Date    BROOK 9.2 07/03/2017     Lab Results   Component Value Date    CO2 29 07/03/2017     Lab Results   Component Value Date    BUN 19 07/03/2017     Lab Results   Component Value Date    CR 1.09 07/03/2017     Lab Results   Component Value Date     07/03/2017         MEDICATIONS:    Current Outpatient Prescriptions   Medication     lisinopril-hydrochlorothiazide (PRINZIDE/ZESTORETIC) 20-12.5 MG per tablet     omeprazole (PRILOSEC) 20 MG CR capsule     aspirin 81 MG tablet     No current facility-administered medications for this visit.          ASSESSMENT:    Armando has gained weight related to decrease in movement/activity along with excessive consumption of a calorically dense food pattern.  Weight gain has resulted in metabolic syndrome of insulin resistance as evidenced by waist circumference with weight gain, elevated glucose to prediabetes levels, low HDL with treated hypertension.  He has gained 100 pounds in the past 7 years.    He sis highly motivated to make changes, and has good skill with food preparation and he likes healthy foods.  Avoiding eating out will be beneficial.      INTERVENTION:    Metabolic syndrome of insulin resistance was discussed. Impact of this syndrome on weight gain promotion was reviewed.   Strategy for fighting insulin resistance was discussed.  Importance of adequate intake of health promoting food in a balanced and consistent pattern was emphasized along with the very important role of movement throughout the day in fighting insulin resistance.  Role of nutrition in weight control was discussed.    In addition to calories in food, importance of balance and consistency of meals with adequate intake of health promoting foods was discussed.  Consistent carbohydrate meal planning was reviewed.  Patient will include 3 meals daily, each with balance and double the vegetable components. Carb choices are recommended to be from whole grains and vegetables, whole fruit and dairy.  Importance of reducing saturated fat by using low fat meat and dairy was discussed.    Balanced plate food pattern was discussed as a model for food inclusion.  Recipes and menus were provided and discussed.  Energy balance was discussed, with focus on balance of foods consumed, amount of food eaten, timing of meals and activity level.  Nutrition to support activity was reviewed.  Importance of increasing activity throughout the day was discussed.  Written materials were provided and questions were answered.    PLAN:    1. Increase vegetables and fruits.  2. Walk daily.      FOLLOW-UP  Patient will return in 3-4 weeks.      [address] [address]  [phone]  [phone]    Date: 10/18/2017  Time: 5:40 PM

## 2017-11-09 ENCOUNTER — OFFICE VISIT (OUTPATIENT)
Dept: NUTRITION | Facility: CLINIC | Age: 37
End: 2017-11-09
Payer: COMMERCIAL

## 2017-11-09 DIAGNOSIS — E88.810 DYSMETABOLIC SYNDROME X: ICD-10-CM

## 2017-11-09 DIAGNOSIS — R73.03 PREDIABETES: ICD-10-CM

## 2017-11-09 DIAGNOSIS — E66.01 MORBID OBESITY (H): Primary | ICD-10-CM

## 2017-11-09 PROCEDURE — 97803 MED NUTRITION INDIV SUBSEQ: CPT | Performed by: DIETITIAN, REGISTERED

## 2017-11-20 VITALS — WEIGHT: 315 LBS | BODY MASS INDEX: 42.66 KG/M2 | HEIGHT: 72 IN

## 2017-11-20 NOTE — PROGRESS NOTES
"REPORT OF MEDICAL NUTRITION THERAPY    Patient Name: Armando Domingo  MRN: [id number],  Age: 37 year old,  Gender: male    Encounter Date: 11/9/2017,  Encounter Time:45 minute follow-up.    Provider: Diana Carreon, FAWN, LD, CDE    Referral received to provide Medical Nutrition Therapy for patient for treatment of morbid obesity, prediabetes,and Metabolic syndrome X.    NUTRITION HISTORY:    Armando reports that he has been more conscious of better food choices with the biggest change being in portion control.  He has had less meat.  He has been bringing his lunch more often, eating healthier options.  He has skim milk.  He does not feel overly restricted, but free to make good choices.    He states this plan has been very comfortable and he prefers a natural approach to change without having to follow a strict regimen.  He is very motivated to continue with permanent changes.    He likes simple manageable goals.  Goals set at last visit:  1. Increase vegetables and fruits.  2. Walk daily.    He has been successful with increasing vegetables, would like to add more.  He has not been able to increase walking adequately, but will focus on this now.    Armando likes to cook.  He would like more ideas about interesting vegetable dishes.    DATA:    Height:  5' 11.5\"  Weight: 344 lbs 4.8 oz  Body Mass Index: Body mass index is 47.35 kg/(m^2).     Wt Readings from Last 5 Encounters:   11/09/17 (!) 156.2 kg (344 lb 4.8 oz)   10/05/17 (!) 157.9 kg (348 lb)   08/10/17 (!) 159.7 kg (352 lb)   07/03/17 (!) 158.3 kg (349 lb)   06/19/17 (!) 160.2 kg (353 lb 1.6 oz)     BP Readings from Last 5 Encounters:   10/05/17 124/86   08/10/17 118/74   07/03/17 130/86   06/19/17 120/70   01/10/17 115/60         LABS:    Glucose   Date Value Ref Range Status   07/03/2017 105 (H) 70 - 99 mg/dL Final     Comment:     Fasting specimen     Recent Labs   Lab Test  07/03/17   0702  05/09/16   0717  11/19/14   0813  10/02/13   0715   CHOL  185  167  190 "  174   HDL  41  31*  43  37*   LDL  121*  112*  129  120   TRIG  116  118  89  87   CHOLHDLRATIO   --    --   4.4  4.7       MEDICATIONS:  Current Outpatient Prescriptions   Medication     lisinopril-hydrochlorothiazide (PRINZIDE/ZESTORETIC) 20-12.5 MG per tablet     omeprazole (PRILOSEC) 20 MG CR capsule     aspirin 81 MG tablet     No current facility-administered medications for this visit.        ASSESSMENT:    Armando is doing well improving his nutritional pattern.  He prefers a natural, gradual positive approach to changes.  He remains motivated.      INTERVENTION:    Food intake pattern and activity plan reviewed.  Ideas for meals and snacks discussed, recipes found and provided.  Suggestions for keeping nutrition interesting were discussed.  Questions answered and material provided.    PLAN:    Add more vegetables.  Try new recipes.  Increase walking.  Continue with previous goals.      FOLLOW-UP  Patient will return in 1 month.      [address] [address]  [phone]  [phone]    Date: 11/20/2017  Time: 4:57 PM

## 2017-12-04 ENCOUNTER — OFFICE VISIT (OUTPATIENT)
Dept: NUTRITION | Facility: CLINIC | Age: 37
End: 2017-12-04
Payer: COMMERCIAL

## 2017-12-04 VITALS — WEIGHT: 315 LBS | BODY MASS INDEX: 42.66 KG/M2 | HEIGHT: 72 IN

## 2017-12-04 DIAGNOSIS — E66.01 MORBID OBESITY (H): Primary | ICD-10-CM

## 2017-12-04 DIAGNOSIS — E88.810 DYSMETABOLIC SYNDROME X: ICD-10-CM

## 2017-12-04 DIAGNOSIS — R73.03 PREDIABETES: ICD-10-CM

## 2017-12-04 PROCEDURE — 97803 MED NUTRITION INDIV SUBSEQ: CPT | Performed by: DIETITIAN, REGISTERED

## 2017-12-04 NOTE — PROGRESS NOTES
"REPORT OF MEDICAL NUTRITION THERAPY    Patient Name: Armando Domingo  MRN: [id number],  Age: 37 year old,  Gender: male    Encounter Date: 12/4/2017,  Encounter Time:30 minute follow-up    Provider: Diana Carreon, FAWN, LD, CDE    Referral received to provide Medical Nutrition Therapy for patient for treatment of obesity, prediabetes and metabolic syndrome X.    NUTRITION HISTORY:   [email or web address]     Armando states he is continuing to cook more, has started making home made soup.  Has fast food less often, twice since last visit.  Is reducing portions and adding vegetables.  He still has not been able to increase activity much.  Today he would like to make this his main goal for the next month.  He also is interested in understanding if his vitamin/mineral and micronutrient status is adequate.      DATA:    Height:  5' 11.5\"  Weight: 347 lbs 1.6 oz  Body Mass Index: Body mass index is 47.74 kg/(m^2).   Wt Readings from Last 5 Encounters:   12/04/17 (!) 157.4 kg (347 lb 1.6 oz)   11/09/17 (!) 156.2 kg (344 lb 4.8 oz)   10/05/17 (!) 157.9 kg (348 lb)   08/10/17 (!) 159.7 kg (352 lb)   07/03/17 (!) 158.3 kg (349 lb)     LABS:  Glucose   Date Value Ref Range Status   07/03/2017 105 (H) 70 - 99 mg/dL Final     Comment:     Fasting specimen   ]  Recent Labs   Lab Test  07/03/17   0702  05/09/16   0717  11/19/14   0813  10/02/13   0715   CHOL  185  167  190  174   HDL  41  31*  43  37*   LDL  121*  112*  129  120   TRIG  116  118  89  87   CHOLHDLRATIO   --    --   4.4  4.7         MEDICATIONS:  Current Outpatient Prescriptions   Medication     lisinopril-hydrochlorothiazide (PRINZIDE/ZESTORETIC) 20-12.5 MG per tablet     omeprazole (PRILOSEC) 20 MG CR capsule     aspirin 81 MG tablet     No current facility-administered medications for this visit.            ASSESSMENT:    Armando states that the gradual approach to making realistic, natural lifestyle changes is helping him feel positive and motivated to continue " improving nutrition and activity plan.  He has had weight loss, and reports feeling better.      INTERVENTION:      Food intake pattern and activity plan reviewed with patient.  Mineral requirements especially magnesium discussed, along with food sources.  Options for multivitamins discussed.  Value of increasing healthy food choices to obtain phytochemicals found in whole plant products that are not found in oral supplements.  Goals reviewed, and redefined.      PLAN:  1. Add a Men's formula multi vitamin and mineral complex such as Centrum for Men.  2. Focus on increasing activity.  3. Continue to use portion control in balanced meals.  4. Eat more vegetables.      FOLLOW-UP  Patient will return in 1 month.      [address] [address]  [phone]  [phone]    Date: 12/4/2017  Time: 5:47 PM

## 2018-01-04 ENCOUNTER — OFFICE VISIT (OUTPATIENT)
Dept: NUTRITION | Facility: CLINIC | Age: 38
End: 2018-01-04
Payer: COMMERCIAL

## 2018-01-04 ENCOUNTER — ALLIED HEALTH/NURSE VISIT (OUTPATIENT)
Dept: PEDIATRICS | Facility: CLINIC | Age: 38
End: 2018-01-04
Payer: COMMERCIAL

## 2018-01-04 VITALS
DIASTOLIC BLOOD PRESSURE: 84 MMHG | WEIGHT: 315 LBS | SYSTOLIC BLOOD PRESSURE: 122 MMHG | HEIGHT: 72 IN | BODY MASS INDEX: 42.66 KG/M2

## 2018-01-04 DIAGNOSIS — E66.01 MORBID OBESITY (H): Primary | ICD-10-CM

## 2018-01-04 DIAGNOSIS — I10 HYPERTENSION GOAL BP (BLOOD PRESSURE) < 140/90: Primary | ICD-10-CM

## 2018-01-04 DIAGNOSIS — E88.810 DYSMETABOLIC SYNDROME X: ICD-10-CM

## 2018-01-04 DIAGNOSIS — R73.03 PREDIABETES: ICD-10-CM

## 2018-01-04 PROCEDURE — 99207 ZZC NO CHARGE NURSE ONLY: CPT

## 2018-01-04 PROCEDURE — 97803 MED NUTRITION INDIV SUBSEQ: CPT | Performed by: DIETITIAN, REGISTERED

## 2018-01-04 NOTE — MR AVS SNAPSHOT
After Visit Summary   1/4/2018    Armando Domingo    MRN: [id number]           Patient Information     Date Of Birth          1980        Visit Information        Provider Department      1/4/2018 4:40 PM MG ANCILLARY [facility]        Today's Diagnoses     Hypertension goal BP (blood pressure) < 140/90    -  1       Follow-ups after your visit        Your next 10 appointments already scheduled     Feb 01, 2018  5:00 PM CST   Return Visit with Diana Carreon   [facility] ([facility]    [id number] [place] [address] [phone]              Who to contact     If you have questions or need follow up information about today's clinic visit or your schedule please contact [facility] directly at [phone].  Normal or non-critical lab and imaging results will be communicated to you by [employer]fl[employer], letter or phone within 4 business days after the clinic has received the results. If you do not hear from us within 7 days, please contact the clinic through [employer] or phone. If you have a critical or abnormal lab result, we will notify you by phone as soon as possible.  Submit refill requests through Echometrix or call your pharmacy and they will forward the refill request to us. Please allow 3 business days for your refill to be completed.          Additional Information About Your Visit        Careerflohart Information     E[employer]rix gives you secure access to your electronic health record. If you see a primary care provider, you can also send messages to your care team and make appointments. If you have questions, please call your primary care clinic.  If you do not have a primary care provider, please call [phone] and they will assist you.      Echometrix is an electronic gateway that provides easy, online access to your medical records. With Echometrix, you can request a clinic appointment, read your test results, renew a  "prescription or communicate with your care team.     To access your existing account, please contact your [facility] or call [phone] for assistance.        Care EveryWhere ID     This is your Care EveryWhere ID. This could be used by other organizations to access your [place] medical records  [id number]        Your Vitals Were     Height BMI (Body Mass Index)                5' 11.5\" (1.816 m) 47.86 kg/m2           Blood Pressure from Last 3 Encounters:   01/04/18 122/84   10/05/17 124/86   08/10/17 118/74    Weight from Last 3 Encounters:   01/04/18 (!) 348 lb (157.9 kg)   12/04/17 (!) 347 lb 1.6 oz (157.4 kg)   11/09/17 (!) 344 lb 4.8 oz (156.2 kg)              Today, you had the following     No orders found for display       Primary Care Provider Office Phone # Fax #    Natali Tucker MD PhD [phone]       [address] [facility] [place] [address]        Equal Access to Services     [facility]: Hadii aad ku hadasho Soomaali, waaxda luqadaha, qaybta kaalmada adeegyada, anamaria cardnoa . So [facility] [phone].    ATENCIÓN: Si habla español, tiene a chao disposición servicios gratuitos de asistencia lingüística. [facility] [phone].    We comply with applicable federal civil rights laws and [place] laws. We do not discriminate on the basis of race, color, national origin, age, disability, sex, sexual orientation, or gender identity.            Thank you!     Thank you for choosing [facility]  for your care. Our goal is always to provide you with excellent care. Hearing back from our patients is one way we can continue to improve our services. Please take a few minutes to complete the written survey that you may receive in the mail after your visit with us. Thank you!             Your Updated Medication List - Protect others around you: Learn how to safely use, store and throw away your medicines at www.disposemymeds.org. "          This list is accurate as of: 1/4/18  6:23 PM.  Always use your most recent med list.                   Brand Name Dispense Instructions for use Diagnosis    aspirin 81 MG tablet     30 tablet    Take 1 tablet by mouth daily.    Hypertension goal BP (blood pressure) < 140/90       lisinopril-hydrochlorothiazide 20-12.5 MG per tablet    PRINZIDE/ZESTORETIC    180 tablet    Take 2 tablets by mouth daily    Hypertension goal BP (blood pressure) < 140/90       omeprazole 20 MG CR capsule    priLOSEC    30 capsule    Take 1 capsule (20 mg) by mouth daily    Other acute gastritis

## 2018-01-05 ENCOUNTER — TELEPHONE (OUTPATIENT)
Dept: PEDIATRICS | Facility: CLINIC | Age: 38
End: 2018-01-05

## 2018-01-05 NOTE — TELEPHONE ENCOUNTER
[facility] Call Center    Phone Message    Name of Caller: Armando    Phone Number: Home number on file [phone] (home) or Cell number on file:    Telephone Information:   Mobile [phone]       Best time to return call: Any    May a detailed message be left on voicemail: yes    Relation to patient: Self    Reason for Call: Other: Returning clinics call. Please call to advise     Action Taken: Message routed to:  Primary Care p [phone]

## 2018-01-05 NOTE — TELEPHONE ENCOUNTER
See 1/4/2018 telephone encounter.    Patient informed BP controlled, no change in BP meds.    Krystal Willis RN,   [facility]

## 2018-01-05 NOTE — PROGRESS NOTES
Armando Domingo is a 37 year old male who comes in today for a Blood Pressure check because of ongoing blood pressure monitoring.    *Document pulse and BP  *Use new set of vitals button for multiple readings.  *Use extended vitals for orthostatic    Vitals as recorded, a large cuff was used.    Patient is taking medication as prescribed  Patient is tolerating medications well.  Patient is monitoring Blood Pressure at home.  Average readings if yes are unknown    Current complaints: none    Disposition: follow-up as indicated by MD/AP and results routed to MD/AP

## 2018-05-08 DIAGNOSIS — I10 HYPERTENSION GOAL BP (BLOOD PRESSURE) < 140/90: ICD-10-CM

## 2018-05-09 RX ORDER — LISINOPRIL 40 MG/1
TABLET ORAL
Qty: 90 TABLET | Refills: 1 | OUTPATIENT
Start: 2018-05-09

## 2018-05-09 RX ORDER — HYDROCHLOROTHIAZIDE 25 MG/1
TABLET ORAL
Qty: 90 TABLET | Refills: 1 | OUTPATIENT
Start: 2018-05-09

## 2018-05-09 NOTE — TELEPHONE ENCOUNTER
hydrochlorothiazide (HYDRODIURIL) 25 MG tablet (Discontinued) 90 tablet 1 10/5/2017 10/5/2017 No   Sig: TAKE ONE TABLET BY MOUTH ONCE DAILY   Class: E-Prescribe   Reason for Discontinue: Alternate therapy     lisinopril (PRINIVIL/ZESTRIL) 40 MG tablet (Discontinued) 90 tablet 1 10/5/2017 10/5/2017 No   Sig: TAKE ONE TABLET BY MOUTH ONCE DAILY   Class: E-Prescribe   Reason for Discontinue: Alternate therapy   Order: [id number]     Both above Rx were changed to a different therapy.     Vera Shah RN

## 2018-05-10 ENCOUNTER — TELEPHONE (OUTPATIENT)
Dept: PEDIATRICS | Facility: CLINIC | Age: 38
End: 2018-05-10

## 2018-05-10 DIAGNOSIS — I10 HYPERTENSION GOAL BP (BLOOD PRESSURE) < 140/90: ICD-10-CM

## 2018-05-10 RX ORDER — LISINOPRIL 40 MG/1
40 TABLET ORAL DAILY
Qty: 90 TABLET | Refills: 1 | Status: SHIPPED | OUTPATIENT
Start: 2018-05-10 | End: 2018-12-28

## 2018-05-10 RX ORDER — HYDROCHLOROTHIAZIDE 25 MG/1
25 TABLET ORAL DAILY
Qty: 90 TABLET | Refills: 1 | Status: SHIPPED | OUTPATIENT
Start: 2018-05-10 | End: 2018-12-28

## 2018-05-10 NOTE — TELEPHONE ENCOUNTER
[facility] states they have this current refill available for patient. He has never picked up this RX.    lisinopril-hydrochlorothiazide (PRINZIDE/ZESTORETIC) 20-12.5 MG per tablet 180 tablet 3 10/5/2017  --      Sig: Take 2 tablets by mouth daily         Per patients wife the combined pill is more expensive and they would like to stick with the individual meds.     Remaining refills sent to pharmacy per original order.         Krystal Willis RN,   [facility]

## 2018-05-10 NOTE — TELEPHONE ENCOUNTER
2:58 PM   Note      hydrochlorothiazide (HYDRODIURIL) 25 MG tablet (Discontinued) 90 tablet 1 10/5/2017 10/5/2017 No   Sig: TAKE ONE TABLET BY MOUTH ONCE DAILY   Class: E-Prescribe   Reason for Discontinue: Alternate therapy      lisinopril (PRINIVIL/ZESTRIL) 40 MG tablet (Discontinued) 90 tablet 1 10/5/2017 10/5/2017 No   Sig: TAKE ONE TABLET BY MOUTH ONCE DAILY   Class: E-Prescribe   Reason for Discontinue: Alternate therapy   Order: [id number]      Both above Rx were changed to a different therapy.         [facility]    Phone Message    May a detailed message be left on voicemail: yes    Reason for Call: Other: wife called to ask about where the scripts are- what has happened to get a refill?  please see above msg and call patient's wife asap as patient has no meds left and needs refill- thanks     Action Taken: Message routed to:  Primary Care p [phone]

## 2018-06-12 ENCOUNTER — OFFICE VISIT (OUTPATIENT)
Dept: PEDIATRICS | Facility: CLINIC | Age: 38
End: 2018-06-12
Payer: COMMERCIAL

## 2018-06-12 VITALS
HEART RATE: 83 BPM | DIASTOLIC BLOOD PRESSURE: 86 MMHG | BODY MASS INDEX: 49 KG/M2 | TEMPERATURE: 98.7 F | WEIGHT: 315 LBS | OXYGEN SATURATION: 96 % | SYSTOLIC BLOOD PRESSURE: 129 MMHG

## 2018-06-12 DIAGNOSIS — E66.01 MORBID OBESITY (H): ICD-10-CM

## 2018-06-12 DIAGNOSIS — E78.5 HYPERLIPIDEMIA LDL GOAL <160: ICD-10-CM

## 2018-06-12 DIAGNOSIS — Z00.00 ROUTINE GENERAL MEDICAL EXAMINATION AT A HEALTH CARE FACILITY: Primary | ICD-10-CM

## 2018-06-12 DIAGNOSIS — Z87.898 HISTORY OF SNORING: ICD-10-CM

## 2018-06-12 DIAGNOSIS — I10 HYPERTENSION GOAL BP (BLOOD PRESSURE) < 140/90: ICD-10-CM

## 2018-06-12 DIAGNOSIS — G47.19 EXCESSIVE DAYTIME SLEEPINESS: ICD-10-CM

## 2018-06-12 PROCEDURE — 99395 PREV VISIT EST AGE 18-39: CPT | Performed by: FAMILY MEDICINE

## 2018-06-12 NOTE — PATIENT INSTRUCTIONS
Preventive Health Recommendations  Male Ages 26 - 39    Yearly exam:             See your health care provider every year in order to  o   Review health changes.   o   Discuss preventive care.    o   Review your medicines if your doctor has prescribed any.    You should be tested each year for STDs (sexually transmitted diseases), if you re at risk.     After age 35, talk to your provider about cholesterol testing. If you are at risk for heart disease, have your cholesterol tested at least every 5 years.     If you are at risk for diabetes, you should have a diabetes test (fasting glucose).  Shots: Get a flu shot each year. Get a tetanus shot every 10 years.     Nutrition:    Eat at least 5 servings of fruits and vegetables daily.     Eat whole-grain bread, whole-wheat pasta and brown rice instead of white grains and rice.     Talk to your provider about Calcium and Vitamin D.     Lifestyle    Exercise for at least 150 minutes a week (30 minutes a day, 5 days a week). This will help you control your weight and prevent disease.     Limit alcohol to one drink per day.     No smoking.     Wear sunscreen to prevent skin cancer.     See your dentist every six months for an exam and cleaning.     Obstructive Sleep Apnea  Obstructive sleep apnea is a condition that causes your air passages to become narrowed or blocked during sleep. As a result, breathing stops for short periods. Your body wakes up enough for breathing to begin again, though you don't remember it. The cycle of stopped breathing and brief awakenings can repeat dozens of times a night. This prevents the body from getting to the deeper stages of sleep that are needed for good rest and may cause your body's oxygen level to fall.  Signs of sleep apnea include loud snoring, noisy breathing, and gasping sounds during sleep. Daytime symptoms include waking up tired after a full night's sleep, waking up with headaches, feeling very sleepy or falling asleep during  the day, and having problems with memory or concentration.  Risk factors for sleep apnea include:  Being overweight  Being a man, or a woman in menopause  Smoking  Using alcohol or sedating medicines  Having enlarged structures in the nose or throat  Home care  Lifestyle changes that can help treat snoring and sleep apnea include the following:  If you are overweight, lose weight. Talk to your healthcare provider about a weight-loss plan for you.  Avoid alcohol for 3 to 4 hours before bedtime. Avoid sedating medications. Ask your healthcare provider about the medicines you take.  If you smoke, talk to your healthcare provider about ways to quit.  Sleep on your side. This can help prevent gravity from pulling relaxed throat tissues into your breathing passages.  If you have allergies or sinus problems that block your nose, ask your healthcare provider for help.  Follow-up care  Follow up with your healthcare provider, or as advised. A diagnosis of sleep apnea is made with a sleep study. Your healthcare provider can tell you more about this test.  When to seek medical advice  Sleep apnea can make you more likely to have certain health problems. These include high blood pressure, heart attack, stroke, and sexual dysfunction. If you have sleep apnea, talk to your healthcare provider about the best treatments for you.  Date Last Reviewed: 4/1/2017 2000-2017 [employer]. [address] [place] [address], [place] [address]. All rights reserved. This information is not intended as a substitute for professional medical care. Always follow your healthcare professional's instructions.

## 2018-06-12 NOTE — PROGRESS NOTES
SUBJECTIVE:   CC: Armando Domingo is an 37 year old male who presents for preventative health visit.     Healthy Habits:    Do you get at least three servings of calcium containing foods daily (dairy, green leafy vegetables, etc.)? yes    Amount of exercise or daily activities, outside of work: no    Problems taking medications regularly No    Medication side effects: No    Have you had an eye exam in the past two years? no    Do you see a dentist twice per year? no    Do you have sleep apnea, excessive snoring or daytime drowsiness?yes, sleep apnea concerns, excessive snoring and daytime drowsiness     He lists Dr Tucker as his primary care provider and saw her last on 10/5/2107 [facility] seen on 1/4/2018    Today's PHQ-2 Score:   PHQ-2 ( 1999 [employer]) 6/12/2018 1/10/2017   Q1: Little interest or pleasure in doing things 0 0   Q2: Feeling down, depressed or hopeless 0 0   PHQ-2 Score 0 0       Abuse: Current or Past(Physical, Sexual or Emotional)- No  Do you feel safe in your environment - Yes    Social History   Substance Use Topics     Smoking status: Former Smoker     Packs/day: 0.10     Years: 15.00     Types: Cigarettes     Start date: 1/1/1996     Quit date: 1/1/2013     Smokeless tobacco: Never Used     Alcohol use 24.0 oz/week     40 Standard drinks or equivalent per week      Comment: advised cutting down 7/3/17      If you drink alcohol do you typically have >3 drinks per day or >7 drinks per week? Yes     _________________________________________       Patient with history of hypertension, hyperlipidemia, morbid obesity, and hepatic steatosis.                Last PSA: No results found for: PSA    Reviewed orders with patient. Reviewed health maintenance and updated orders accordingly - Yes  BP Readings from Last 3 Encounters:   06/12/18 129/86   01/04/18 122/84   10/05/17 124/86    Wt Readings from Last 3 Encounters:   06/12/18 (!) 356 lb 4.8 oz (161.6 kg)    01/04/18 (!) 348 lb (157.9 kg)   12/04/17 (!) 347 lb 1.6 oz (157.4 kg)                  Patient Active Problem List   Diagnosis     Hypertension goal BP (blood pressure) < 140/90     Hyperlipidemia LDL goal <130     Hepatic steatosis     Alcohol dependence, continuous (H)     Cervicalgia     Morbid obesity due to excess calories (H)     Past Surgical History:   Procedure Laterality Date     SOFT TISSUE SURGERY      cyst removal       Social History   Substance Use Topics     Smoking status: Former Smoker     Packs/day: 0.10     Years: 15.00     Types: Cigarettes     Start date: 1/1/1996     Quit date: 1/1/2013     Smokeless tobacco: Never Used     Alcohol use 24.0 oz/week     40 Standard drinks or equivalent per week      Comment: advised cutting down 7/3/17     Family History   Problem Relation Age of Onset     DIABETES Mother      Hypertension Mother      Cancer - colorectal Maternal Grandfather      DIABETES Paternal Grandmother      CEREBROVASCULAR DISEASE Paternal Grandmother      C.A.D. Father      Asthma No family hx of      Breast Cancer No family hx of      Prostate Cancer No family hx of      Thyroid Disease No family hx of      Lipids No family hx of          Current Outpatient Prescriptions   Medication Sig Dispense Refill     aspirin 81 MG tablet Take 1 tablet by mouth daily. 30 tablet 3     hydrochlorothiazide (HYDRODIURIL) 25 MG tablet Take 1 tablet (25 mg) by mouth daily 90 tablet 1     lisinopril (PRINIVIL/ZESTRIL) 40 MG tablet Take 1 tablet (40 mg) by mouth daily 90 tablet 1     omeprazole (PRILOSEC) 20 MG CR capsule Take 1 capsule (20 mg) by mouth daily (Patient not taking: Reported on 6/12/2018) 30 capsule 1     No Known Allergies    Reviewed and updated as needed this visit by clinical staff  Allergies         Reviewed and updated as needed this visit by Provider        Past Medical History:   Diagnosis Date     Reflux         ROS:  CONSTITUTIONAL:NEGATIVE for fever, chills, change in  weight  INTEGUMENTARY/SKIN: NEGATIVE for worrisome rashes, moles or lesions  EYES: NEGATIVE for vision changes or irritation  ENT: NEGATIVE for ear, mouth and throat problems  RESP: NEGATIVE for significant cough. Get SOB when climbing stairs and has noted intermittent issues with his breathing  CV: NEGATIVE for chest pain, palpitations or peripheral edema  GI: NEGATIVE for nausea, abdominal pain, heartburn, or change in bowel habits   male: negative for dysuria, hematuria, decreased urinary stream, erectile dysfunction, urethral discharge  MUSCULOSKELETAL: NEGATIVE for significant arthralgias or myalgia  NEURO: NEGATIVE for weakness, dizziness or paresthesias  PSYCHIATRIC: NEGATIVE for changes in mood or affect  See PMH, problem list, and presenting concerns today.     OBJECTIVE:   /86 (BP Location: Right arm, Patient Position: Sitting, Cuff Size: Adult Large)  Pulse 83  Temp 98.7  F (37.1  C) (Oral)  Wt (!) 356 lb 4.8 oz (161.6 kg)  SpO2 96%  BMI 49 kg/m2  EXAM:  GENERAL: healthy, alert, no distress and obese  HENT: ear canals and TM's normal, nose and mouth without ulcers or lesions  NECK: no adenopathy, no asymmetry, masses, or scars and thyroid normal to palpation  RESP: lungs clear to auscultation - no rales, rhonchi or wheezes  CV: regular rate and rhythm, normal S1 S2, no S3 or S4, no murmur, click or rub, no peripheral edema and peripheral pulses strong  ABDOMEN: soft, nontender, without hepatosplenomegaly or masses and bowel sounds normal  MS: no gross musculoskeletal defects noted, no edema  SKIN: no suspicious lesions or rashes  NEURO: Normal strength and tone, mentation intact and speech normal  PSYCH: mentation appears normal, affect normal/bright    ASSESSMENT/PLAN:       ICD-10-CM    1. Routine general medical examination at a health care facility Z00.00    2. Morbid obesity (H) E66.01 SLEEP EVALUATION & MANAGEMENT REFERRAL - [facility]  [phone]  "(Age 15 and up)   3. History of snoring Z87.898 SLEEP EVALUATION & MANAGEMENT REFERRAL - [facility]  [phone] (Age 15 and up)   4. Excessive daytime sleepiness G47.19 SLEEP EVALUATION & MANAGEMENT REFERRAL - [facility]  [phone] (Age 15 and up)   5. Hypertension goal BP (blood pressure) < 140/90 I10        COUNSELING:  Reviewed preventive health counseling, as reflected in patient instructions  Special attention given to:        Regular exercise       Healthy diet/nutrition       Day time somulence and legerndary snoring/       reports that he quit smoking about 5 years ago. His smoking use included Cigarettes. He started smoking about 22 years ago. He has a 1.50 pack-year smoking history. He has never used smokeless tobacco.    Estimated body mass index is 49 kg/(m^2) as calculated from the following:    Height as of 1/4/18: 5' 11.5\" (1.816 m).    Weight as of this encounter: 356 lb 4.8 oz (161.6 kg).       John Sun MD  [facility]  "

## 2018-06-12 NOTE — MR AVS SNAPSHOT
After Visit Summary   6/12/2018    Armando Domingo    MRN: [id number]           Patient Information     Date Of Birth          1980        Visit Information        Provider Department      6/12/2018 6:30 PM John Sun MD [facility]        Today's Diagnoses     Routine general medical examination at a health care facility    -  1    Morbid obesity (H)        History of snoring        Excessive daytime sleepiness        Hypertension goal BP (blood pressure) < 140/90        Hyperlipidemia LDL goal <160          Care Instructions      Preventive Health Recommendations  Male Ages 26 - 39    Yearly exam:             See your health care provider every year in order to  o   Review health changes.   o   Discuss preventive care.    o   Review your medicines if your doctor has prescribed any.    You should be tested each year for STDs (sexually transmitted diseases), if you re at risk.     After age 35, talk to your provider about cholesterol testing. If you are at risk for heart disease, have your cholesterol tested at least every 5 years.     If you are at risk for diabetes, you should have a diabetes test (fasting glucose).  Shots: Get a flu shot each year. Get a tetanus shot every 10 years.     Nutrition:    Eat at least 5 servings of fruits and vegetables daily.     Eat whole-grain bread, whole-wheat pasta and brown rice instead of white grains and rice.     Talk to your provider about Calcium and Vitamin D.     Lifestyle    Exercise for at least 150 minutes a week (30 minutes a day, 5 days a week). This will help you control your weight and prevent disease.     Limit alcohol to one drink per day.     No smoking.     Wear sunscreen to prevent skin cancer.     See your dentist every six months for an exam and cleaning.     Obstructive Sleep Apnea  Obstructive sleep apnea is a condition that causes your air passages to become narrowed or blocked during sleep. As a result,  breathing stops for short periods. Your body wakes up enough for breathing to begin again, though you don't remember it. The cycle of stopped breathing and brief awakenings can repeat dozens of times a night. This prevents the body from getting to the deeper stages of sleep that are needed for good rest and may cause your body's oxygen level to fall.  Signs of sleep apnea include loud snoring, noisy breathing, and gasping sounds during sleep. Daytime symptoms include waking up tired after a full night's sleep, waking up with headaches, feeling very sleepy or falling asleep during the day, and having problems with memory or concentration.  Risk factors for sleep apnea include:  Being overweight  Being a man, or a woman in menopause  Smoking  Using alcohol or sedating medicines  Having enlarged structures in the nose or throat  Home care  Lifestyle changes that can help treat snoring and sleep apnea include the following:  If you are overweight, lose weight. Talk to your healthcare provider about a weight-loss plan for you.  Avoid alcohol for 3 to 4 hours before bedtime. Avoid sedating medications. Ask your healthcare provider about the medicines you take.  If you smoke, talk to your healthcare provider about ways to quit.  Sleep on your side. This can help prevent gravity from pulling relaxed throat tissues into your breathing passages.  If you have allergies or sinus problems that block your nose, ask your healthcare provider for help.  Follow-up care  Follow up with your healthcare provider, or as advised. A diagnosis of sleep apnea is made with a sleep study. Your healthcare provider can tell you more about this test.  When to seek medical advice  Sleep apnea can make you more likely to have certain health problems. These include high blood pressure, heart attack, stroke, and sexual dysfunction. If you have sleep apnea, talk to your healthcare provider about the best treatments for you.  Date Last Reviewed:  4/1/2017 2000-2017 [employer]. [address] [place] [address], [place] [address]. All rights reserved. This information is not intended as a substitute for professional medical care. Always follow your healthcare professional's instructions.                Follow-ups after your visit        Additional Services     SLEEP EVALUATION & MANAGEMENT REFERRAL - [facility]  [phone] (Age 15 and up)       Please be aware that coverage of these services is subject to the terms and limitations of your health insurance plan.  Call member services at your health plan with any benefit or coverage questions.      Please bring the following to your appointment:    >>   List of current medications   >>   This referral request   >>   Any documents/labs given to you for this referral                      Future tests that were ordered for you today     Open Future Orders        Priority Expected Expires Ordered    CBC with platelets and differential Routine  6/12/2019 6/12/2018    Comprehensive metabolic panel (BMP + Alb, Alk Phos, ALT, AST, Total. Bili, TP) Routine  6/12/2019 6/12/2018    Lipid panel reflex to direct LDL Fasting Routine 6/12/2018 6/12/2019 6/12/2018    SLEEP EVALUATION & MANAGEMENT REFERRAL - [facility]  [phone] (Age 15 and up) Routine 6/29/2018 6/12/2019 6/12/2018            Who to contact     If you have questions or need follow up information about today's clinic visit or your schedule please contact [facility] directly at [phone].  Normal or non-critical lab and imaging results will be communicated to you by MyChart, letter or phone within 4 business days after the clinic has received the results. If you do not hear from us within 7 days, please contact the clinic through MyChart or phone. If you have a critical or abnormal lab result, we will notify you by phone as soon as possible.  Submit refill  requests through [employer] or call your pharmacy and they will forward the refill request to us. Please allow 3 business days for your refill to be completed.          Additional Information About Your Visit        [employer] Information     [employer] gives you secure access to your electronic health record. If you see a primary care provider, you can also send messages to your care team and make appointments. If you have questions, please call your primary care clinic.  If you do not have a primary care provider, please call [phone] and they will assist you.      [employer] is an electronic gateway that provides easy, online access to your medical records. With [employer], you can request a clinic appointment, read your test results, renew a prescription or communicate with your care team.     To access your existing account, please contact your [facility] or call [phone] for assistance.        Care EveryWhere ID     This is your Care EveryWhere ID. This could be used by other organizations to access your [place] medical records  [id number]        Your Vitals Were     Pulse Temperature Pulse Oximetry BMI (Body Mass Index)          83 98.7  F (37.1  C) (Oral) 96% 49 kg/m2         Blood Pressure from Last 3 Encounters:   06/12/18 129/86   01/04/18 122/84   10/05/17 124/86    Weight from Last 3 Encounters:   06/12/18 (!) 356 lb 4.8 oz (161.6 kg)   01/04/18 (!) 348 lb (157.9 kg)   12/04/17 (!) 347 lb 1.6 oz (157.4 kg)               Primary Care Provider Office Phone # Fax #    Natali Tucker MD PhD [phone]       [address]  [facility] [address]        Equal Access to Services     [facility] : Hadii aad ku hadasho Soomaali, waaxda luqadaha, qaybta kaalmada adeegyada, anamaria cardona . So [facility] [phone].    ATENCIÓN: Si habla español, tiene a chao disposición servicios gratuitos de asistencia lingüística. Llame al [phone].    We comply  with applicable federal civil rights laws and [place] laws. We do not discriminate on the basis of race, color, national origin, age, disability, sex, sexual orientation, or gender identity.            Thank you!     Thank you for choosing [facility]  for your care. Our goal is always to provide you with excellent care. Hearing back from our patients is one way we can continue to improve our services. Please take a few minutes to complete the written survey that you may receive in the mail after your visit with us. Thank you!             Your Updated Medication List - Protect others around you: Learn how to safely use, store and throw away your medicines at www.disposemymeds.org.          This list is accurate as of 6/12/18  7:04 PM.  Always use your most recent med list.                   Brand Name Dispense Instructions for use Diagnosis    aspirin 81 MG tablet     30 tablet    Take 1 tablet by mouth daily.    Hypertension goal BP (blood pressure) < 140/90       hydrochlorothiazide 25 MG tablet    HYDRODIURIL    90 tablet    Take 1 tablet (25 mg) by mouth daily    Hypertension goal BP (blood pressure) < 140/90       lisinopril 40 MG tablet    PRINIVIL/ZESTRIL    90 tablet    Take 1 tablet (40 mg) by mouth daily    Hypertension goal BP (blood pressure) < 140/90

## 2018-06-14 ENCOUNTER — OFFICE VISIT (OUTPATIENT)
Dept: FAMILY MEDICINE | Facility: CLINIC | Age: 38
End: 2018-06-14
Payer: COMMERCIAL

## 2018-06-14 VITALS
DIASTOLIC BLOOD PRESSURE: 84 MMHG | BODY MASS INDEX: 42.66 KG/M2 | TEMPERATURE: 97.6 F | HEIGHT: 72 IN | RESPIRATION RATE: 16 BRPM | SYSTOLIC BLOOD PRESSURE: 125 MMHG | HEART RATE: 97 BPM | OXYGEN SATURATION: 97 % | WEIGHT: 315 LBS

## 2018-06-14 DIAGNOSIS — J22 LOWER RESPIRATORY TRACT INFECTION: Primary | ICD-10-CM

## 2018-06-14 PROCEDURE — 99213 OFFICE O/P EST LOW 20 MIN: CPT | Performed by: FAMILY MEDICINE

## 2018-06-14 RX ORDER — CODEINE PHOSPHATE AND GUAIFENESIN 10; 100 MG/5ML; MG/5ML
2 SOLUTION ORAL EVERY 4 HOURS PRN
Qty: 236 ML | Refills: 1 | Status: SHIPPED | OUTPATIENT
Start: 2018-06-14 | End: 2019-02-01

## 2018-06-14 ASSESSMENT — PAIN SCALES - GENERAL: PAINLEVEL: MILD PAIN (3)

## 2018-06-14 NOTE — MR AVS SNAPSHOT
"              After Visit Summary   6/14/2018    Armando Domingo    MRN: [id number]           Patient Information     Date Of Birth          1980        Visit Information        Provider Department      6/14/2018 9:40 AM Brenden Tucker MD [facility]        Today's Diagnoses     Lower respiratory tract infection    -  1       Follow-ups after your visit        Who to contact     If you have questions or need follow up information about today's clinic visit or your schedule please contact [facility] directly at [phone].  Normal or non-critical lab and imaging results will be communicated to you by zeeWAVEShart, letter or phone within 4 business days after the clinic has received the results. If you do not hear from us within 7 days, please contact the clinic through Linkagoalt or phone. If you have a critical or abnormal lab result, we will notify you by phone as soon as possible.  Submit refill requests through MoneyMenttor or call your pharmacy and they will forward the refill request to us. Please allow 3 business days for your refill to be completed.          Additional Information About Your Visit        MyChart Information     [employer] gives you secure access to your electronic health record. If you see a primary care provider, you can also send messages to your care team and make appointments. If you have questions, please call your primary care clinic.  If you do not have a primary care provider, please call [phone] and they will assist you.        Care EveryWhere ID     This is your Care EveryWhere ID. This could be used by other organizations to access your [facility] medical records  [id number]        Your Vitals Were     Pulse Temperature Respirations Height Pulse Oximetry BMI (Body Mass Index)    97 97.6  F (36.4  C) (Oral) 16 5' 11.5\" (1.816 m) 97% 48.55 kg/m2       Blood Pressure from Last 3 Encounters:   06/14/18 125/84   06/12/18 129/86   01/04/18 122/84    Weight " from Last 3 Encounters:   06/14/18 (!) 353 lb (160.1 kg)   06/12/18 (!) 356 lb 4.8 oz (161.6 kg)   01/04/18 (!) 348 lb (157.9 kg)              Today, you had the following     No orders found for display         Today's Medication Changes          These changes are accurate as of 6/14/18  9:46 AM.  If you have any questions, ask your nurse or doctor.               Start taking these medicines.        Dose/Directions    guaiFENesin-codeine 100-10 MG/5ML Soln solution   Commonly known as:  ROBITUSSIN AC   Used for:  Lower respiratory tract infection   Started by:  Brenden Tucker MD        Dose:  2 tsp.   Take 10 mLs by mouth every 4 hours as needed   Quantity:  236 mL   Refills:  1            Where to get your medicines      Some of these will need a paper prescription and others can be bought over the counter.  Ask your nurse if you have questions.     Bring a paper prescription for each of these medications     guaiFENesin-codeine 100-10 MG/5ML Soln solution                Primary Care Provider Office Phone # Fax #    Natali Tucker MD PhD [phone]       [address]  [place] [id number]        Equal Access to Services     [facility]: Hadii ivonne reynoso hadasho Soomaali, waaxda luqadaha, qaybta kaalmada adeegyada, anamaria parnell. So [facility] [phone].    ATENCIÓN: Si habla español, tiene a chao disposición servicios gratuitos de asistencia lingüística. Llame al [phone].    We comply with applicable federal civil rights laws and [place] laws. We do not discriminate on the basis of race, color, national origin, age, disability, sex, sexual orientation, or gender identity.            Thank you!     Thank you for choosing [facility]  for your care. Our goal is always to provide you with excellent care. Hearing back from our patients is one way we can continue to improve our services. Please take a few minutes to complete the written survey that you may  receive in the mail after your visit with us. Thank you!             Your Updated Medication List - Protect others around you: Learn how to safely use, store and throw away your medicines at www.disposemymeds.org.          This list is accurate as of 6/14/18  9:46 AM.  Always use your most recent med list.                   Brand Name Dispense Instructions for use Diagnosis    aspirin 81 MG tablet     30 tablet    Take 1 tablet by mouth daily.    Hypertension goal BP (blood pressure) < 140/90       guaiFENesin-codeine 100-10 MG/5ML Soln solution    ROBITUSSIN AC    236 mL    Take 10 mLs by mouth every 4 hours as needed    Lower respiratory tract infection       hydrochlorothiazide 25 MG tablet    HYDRODIURIL    90 tablet    Take 1 tablet (25 mg) by mouth daily    Hypertension goal BP (blood pressure) < 140/90       lisinopril 40 MG tablet    PRINIVIL/ZESTRIL    90 tablet    Take 1 tablet (40 mg) by mouth daily    Hypertension goal BP (blood pressure) < 140/90

## 2018-06-14 NOTE — PROGRESS NOTES
SUBJECTIVE:   Armando Domingo is a 37 year old male who presents to clinic today for the following health issues:      Acute Illness   Acute illness concerns: cough  Onset: 5 days    Fever: no    Chills/Sweats: no    Headache (location?): YES    Sinus Pressure:no    Conjunctivitis:  no    Ear Pain: no    Rhinorrhea: no    Congestion: no    Sore Throat: YES     Cough: YES-with shortness of breath, worsening since Monday    Wheeze: YES    Decreased Appetite: YES    Nausea: no    Vomiting: no    Diarrhea:  YES    Dysuria/Freq.: no    Fatigue/Achiness: YES    Sick/Strep Exposure: YES- wife with upper respiratory infection.      Therapies Tried and outcome: coricidin chest congestion/cough OTC, vicks vapor rub, cough drops    Problem list and histories reviewed & adjusted, as indicated.  Additional history: as documented    Patient Active Problem List   Diagnosis     Hypertension goal BP (blood pressure) < 140/90     Hyperlipidemia LDL goal <130     Hepatic steatosis     Alcohol dependence, continuous (H)     Cervicalgia     Morbid obesity due to excess calories (H)     Past Surgical History:   Procedure Laterality Date     SOFT TISSUE SURGERY      cyst removal       Social History   Substance Use Topics     Smoking status: Former Smoker     Packs/day: 0.10     Years: 15.00     Types: Cigarettes     Start date: 1/1/1996     Quit date: 1/1/2013     Smokeless tobacco: Never Used     Alcohol use 24.0 oz/week     40 Standard drinks or equivalent per week      Comment: advised cutting down 7/3/17     Family History   Problem Relation Age of Onset     DIABETES Mother      Hypertension Mother      Cancer - colorectal Maternal Grandfather      DIABETES Paternal Grandmother      CEREBROVASCULAR DISEASE Paternal Grandmother      C.A.D. Father      Asthma No family hx of      Breast Cancer No family hx of      Prostate Cancer No family hx of      Thyroid Disease No family hx of      Lipids No family hx of            Reviewed and  "updated as needed this visit by clinical staff  Tobacco  Allergies  Meds  Med Hx  Surg Hx  Fam Hx  Soc Hx      Reviewed and updated as needed this visit by Provider         ROS:  Constitutional, HEENT, cardiovascular, pulmonary, GI, , musculoskeletal, neuro, skin, endocrine and psych systems are negative, except as otherwise noted.    OBJECTIVE:     /84 (BP Location: Right arm, Patient Position: Chair, Cuff Size: Adult Large)  Pulse 97  Temp 97.6  F (36.4  C) (Oral)  Resp 16  Ht 5' 11.5\" (1.816 m)  Wt (!) 353 lb (160.1 kg)  SpO2 97%  BMI 48.55 kg/m2  Body mass index is 48.55 kg/(m^2).  GENERAL: healthy, alert and no distress  NECK: no adenopathy, no asymmetry, masses, or scars and thyroid normal to palpation  RESP: lungs clear to auscultation - no rales, rhonchi or wheezes  CV: regular rate and rhythm, normal S1 S2, no S3 or S4, no murmur, click or rub, no peripheral edema and peripheral pulses strong  ABDOMEN: soft, nontender, no hepatosplenomegaly, no masses and bowel sounds normal  MS: no gross musculoskeletal defects noted, no edema    Diagnostic Test Results:  none     ASSESSMENT/PLAN:     1. Lower respiratory tract infection  Likely viral. Discussed viral course. Discussed symptomatic treatment. Patient given Robitussin AC as needed.  - guaiFENesin-codeine (ROBITUSSIN AC) 100-10 MG/5ML SOLN solution; Take 10 mLs by mouth every 4 hours as needed  Dispense: 236 mL; Refill: 1    See Patient Instructions    Brenden Tucker MD, MD  [facility]  "

## 2018-06-20 ENCOUNTER — OFFICE VISIT (OUTPATIENT)
Dept: FAMILY MEDICINE | Facility: CLINIC | Age: 38
End: 2018-06-20
Payer: COMMERCIAL

## 2018-06-20 ENCOUNTER — OFFICE VISIT (OUTPATIENT)
Dept: SLEEP MEDICINE | Facility: CLINIC | Age: 38
End: 2018-06-20
Attending: FAMILY MEDICINE
Payer: COMMERCIAL

## 2018-06-20 ENCOUNTER — OFFICE VISIT (OUTPATIENT)
Dept: SLEEP MEDICINE | Facility: CLINIC | Age: 38
End: 2018-06-20
Payer: COMMERCIAL

## 2018-06-20 VITALS
WEIGHT: 315 LBS | SYSTOLIC BLOOD PRESSURE: 121 MMHG | HEIGHT: 72 IN | HEART RATE: 88 BPM | BODY MASS INDEX: 42.66 KG/M2 | OXYGEN SATURATION: 95 % | DIASTOLIC BLOOD PRESSURE: 77 MMHG | RESPIRATION RATE: 18 BRPM

## 2018-06-20 VITALS
WEIGHT: 315 LBS | SYSTOLIC BLOOD PRESSURE: 114 MMHG | HEART RATE: 89 BPM | TEMPERATURE: 98.3 F | OXYGEN SATURATION: 96 % | DIASTOLIC BLOOD PRESSURE: 80 MMHG | BODY MASS INDEX: 42.66 KG/M2 | HEIGHT: 72 IN

## 2018-06-20 DIAGNOSIS — R06.83 SNORING: ICD-10-CM

## 2018-06-20 DIAGNOSIS — J20.9 ACUTE BRONCHITIS WITH SYMPTOMS GREATER THAN 10 DAYS: Primary | ICD-10-CM

## 2018-06-20 DIAGNOSIS — E66.01 MORBID OBESITY DUE TO EXCESS CALORIES (H): ICD-10-CM

## 2018-06-20 DIAGNOSIS — G47.19 EXCESSIVE DAYTIME SLEEPINESS: ICD-10-CM

## 2018-06-20 DIAGNOSIS — Z87.898 HISTORY OF SNORING: ICD-10-CM

## 2018-06-20 DIAGNOSIS — G47.19 EXCESSIVE DAYTIME SLEEPINESS: Primary | ICD-10-CM

## 2018-06-20 PROCEDURE — 99204 OFFICE O/P NEW MOD 45 MIN: CPT | Mod: GC

## 2018-06-20 PROCEDURE — G0399 HOME SLEEP TEST/TYPE 3 PORTA: HCPCS | Performed by: INTERNAL MEDICINE

## 2018-06-20 PROCEDURE — 99213 OFFICE O/P EST LOW 20 MIN: CPT | Performed by: NURSE PRACTITIONER

## 2018-06-20 RX ORDER — AZITHROMYCIN 250 MG/1
TABLET, FILM COATED ORAL
Qty: 6 TABLET | Refills: 0 | Status: SHIPPED | OUTPATIENT
Start: 2018-06-20 | End: 2018-08-23

## 2018-06-20 RX ORDER — BENZONATATE 100 MG/1
100 CAPSULE ORAL 3 TIMES DAILY PRN
Qty: 20 CAPSULE | Refills: 0 | Status: SHIPPED | OUTPATIENT
Start: 2018-06-20 | End: 2019-02-01

## 2018-06-20 ASSESSMENT — PAIN SCALES - GENERAL: PAINLEVEL: NO PAIN (0)

## 2018-06-20 NOTE — PATIENT INSTRUCTIONS
Push fluids, rest  Start antibiotic today. Know that your cough may not be completely gone when you're done with your antibiotics  Start benzonatate 1 tab 3 times daily as needed for cough  Continue cheratussin at night as needed  You can use a humidifier by your bed at night. Distilled water should be used with the humidifier.  Tylenol or ibuprofen as needed for pain or fever  Follow up if you are worsening or not improving in 5 days, sooner if needed    At [facility], we strive to deliver an exceptional experience to you, every time we see you.  If you receive a survey in the mail, please send us back your thoughts. We really do value your feedback.    Based on your medical history, these are the current health maintenance/preventive care services that you are due for (some may have been done at this visit.)  Health Maintenance Due   Topic Date Due     PHQ-2 Q1 YR  08/05/1994     HIV SCREEN (SYSTEM ASSIGNED)  08/05/2000     BMP Q1 YR  07/03/2018     LIPID MONITORING Q1 YEAR  07/03/2018         Suggested websites for health information:  Www.[facility].[employer] : Up to date and easily searchable information on multiple topics.  Www.medlineplus.gov : medication info, interactive tutorials, watch real surgeries online  Www.familydoctor.org : good info from the Academy of Family Physicians  Www.cdc.gov : public health info, travel advisories, epidemics (H1N1)  Www.aap.org : children's health info, normal development, vaccinations  Www.health.[facility].[place] : [place] dept of health, public health issues in [place], N1N1    Your care team:                            Family Medicine Internal Medicine   MD Arun Ramsey MD Shantel Branch-Fleming, MD Katya Georgiev PA-C Nam Ho, MD Pediatrics   STEPHENIE Gustafson, MD Karolina Miranda CNP, MD Deborah Mielke, MD Kim Thein, APRN JULIETA      Clinic hours: Monday - Thursday 7 am-7 pm; Fridays 7  am-5 pm.   Urgent care: Monday - Friday 11 am-9 pm; Saturday and Sunday 9 am-5 pm.  Pharmacy : Monday -Thursday 8 am-8 pm; Friday 8 am-6 pm; Saturday and Sunday 9 am-5 pm.     Clinic: [phone]   Pharmacy: [phone]    Acute Bronchitis  Your healthcare provider has told you that you have acute bronchitis. Bronchitis is infection or inflammation of the bronchial tubes (airways in the lungs). Normally, air moves easily in and out of the airways. Bronchitis narrows the airways, making it harder for air to flow in and out of the lungs. This causes symptoms such as shortness of breath, coughing up yellow or green mucus, and wheezing. Bronchitis can be acute or chronic. Acute means the condition comes on quickly and goes away in a short time, usually within 3 to 10 days. Chronic means a condition lasts a long time and often comes back.    What causes acute bronchitis?  Acute bronchitis almost always starts as a viral respiratory infection, such as a cold or the flu. Certain factors make it more likely for a cold or flu to turn into bronchitis. These include being very young, being elderly, having a heart or lung problem, or having a weak immune system. Cigarette smoking also makes bronchitis more likely.  When bronchitis develops, the airways become swollen. The airways may also become infected with bacteria. This is known as a secondary infection.  Diagnosing acute bronchitis  Your healthcare provider will examine you and ask about your symptoms and health history. You may also have a sputum culture to test the fluid in your lungs. Chest X-rays may be done to look for infection in the lungs.  Treating acute bronchitis  Bronchitis usually clears up as the cold or flu goes away. You can help feel better faster by doing the following:    Take medicine as directed. You may be told to take ibuprofen or other over-the-counter medicines. These help relieve inflammation in your bronchial tubes. Your healthcare provider  may prescribe an inhaler to help open up the bronchial tubes. Most of the time, acute bronchitis is caused by a viral infection. Antibiotics are usually not prescribed for viral infections.    Drink plenty of fluids, such as water, juice, or warm soup. Fluids loosen mucus so that you can cough it up. This helps you breathe more easily. Fluids also prevent dehydration.    Make sure you get plenty of rest.    Do not smoke. Do not allow anyone else to smoke in your home.  Recovery and follow-up  Follow up with your doctor as you are told. You will likely feel better in a week or two. But a dry cough can linger beyond that time. Let your doctor know if you still have symptoms (other than a dry cough) after 2 weeks, or if you re prone to getting bronchial infections. Take steps to protect yourself from future infections. These steps include stopping smoking and avoiding tobacco smoke, washing your hands often, and getting a yearly flu shot.  When to call your healthcare provider  Call the healthcare provider if you have any of the following:    Fever of 100.4 F (38.0 C) or higher, or as advised    Symptoms that get worse, or new symptoms    Trouble breathing    Symptoms that don t start to improve within a week, or within 3 days of taking antibiotics   Date Last Reviewed: 12/1/2016 2000-2017 [employer]. [address] [place] [address], [place] [address]. All rights reserved. This information is not intended as a substitute for professional medical care. Always follow your healthcare professional's instructions.

## 2018-06-20 NOTE — PROGRESS NOTES
"Patient presented to clinic for  and demonstration of the \"HST Device\". Patient was set up and instructed use. Patient verbalized understanding and will be returning device after 10 am.       Patient was given HST sleep logs and written instructions for use.        GABY Jackson                      "

## 2018-06-20 NOTE — MR AVS SNAPSHOT
After Visit Summary   6/20/2018    Armando Domingo    MRN: [id number]           Patient Information     Date Of Birth          1980        Visit Information        Provider Department      6/20/2018 4:40 PM Ana Simmons APRN CNP [facility]        Today's Diagnoses     Acute bronchitis with symptoms greater than 10 days    -  1      Care Instructions    Push fluids, rest  Start antibiotic today. Know that your cough may not be completely gone when you're done with your antibiotics  Start benzonatate 1 tab 3 times daily as needed for cough  Continue cheratussin at night as needed  You can use a humidifier by your bed at night. Distilled water should be used with the humidifier.  Tylenol or ibuprofen as needed for pain or fever  Follow up if you are worsening or not improving in 5 days, sooner if needed    At [facility], we strive to deliver an exceptional experience to you, every time we see you.  If you receive a survey in the mail, please send us back your thoughts. We really do value your feedback.    Based on your medical history, these are the current health maintenance/preventive care services that you are due for (some may have been done at this visit.)  Health Maintenance Due   Topic Date Due     PHQ-2 Q1 YR  08/05/1994     HIV SCREEN (SYSTEM ASSIGNED)  08/05/2000     BMP Q1 YR  07/03/2018     LIPID MONITORING Q1 YEAR  07/03/2018         Suggested websites for health information:  Www.[employer].org : Up to date and easily searchable information on multiple topics.  Www.medlineplus.gov : medication info, interactive tutorials, watch real surgeries online  Www.familydoctor.org : good info from the Academy of Family Physicians  Www.cdc.gov : public health info, travel advisories, epidemics (H1N1)  Www.aap.org : children's health info, normal development, vaccinations  Www.health.state.[place].us : [place] dept of health, public health issues in [place],  N1N1    Your care team:                            Family Medicine Internal Medicine   MD Arun Ramsey MD Shantel Branch-Fleming, MD Katya Georgiev PA-C Nam Ho, MD Pediatrics   STEPHENIE Gustafson, JULIETA Albrecht APRN MD Karolina Glass MD Deborah Mielke, MD Kim Thein, APRN [facility]      Clinic hours: Monday - Thursday 7 am-7 pm; Fridays 7 am-5 pm.   Urgent care: Monday - Friday 11 am-9 pm; Saturday and Sunday 9 am-5 pm.  Pharmacy : Monday -Thursday 8 am-8 pm; Friday 8 am-6 pm; Saturday and Sunday 9 am-5 pm.     Clinic: [phone]   Pharmacy: [phone]    Acute Bronchitis  Your healthcare provider has told you that you have acute bronchitis. Bronchitis is infection or inflammation of the bronchial tubes (airways in the lungs). Normally, air moves easily in and out of the airways. Bronchitis narrows the airways, making it harder for air to flow in and out of the lungs. This causes symptoms such as shortness of breath, coughing up yellow or green mucus, and wheezing. Bronchitis can be acute or chronic. Acute means the condition comes on quickly and goes away in a short time, usually within 3 to 10 days. Chronic means a condition lasts a long time and often comes back.    What causes acute bronchitis?  Acute bronchitis almost always starts as a viral respiratory infection, such as a cold or the flu. Certain factors make it more likely for a cold or flu to turn into bronchitis. These include being very young, being elderly, having a heart or lung problem, or having a weak immune system. Cigarette smoking also makes bronchitis more likely.  When bronchitis develops, the airways become swollen. The airways may also become infected with bacteria. This is known as a secondary infection.  Diagnosing acute bronchitis  Your healthcare provider will examine you and ask about your symptoms and health history. You may also have a sputum culture to test  the fluid in your lungs. Chest X-rays may be done to look for infection in the lungs.  Treating acute bronchitis  Bronchitis usually clears up as the cold or flu goes away. You can help feel better faster by doing the following:    Take medicine as directed. You may be told to take ibuprofen or other over-the-counter medicines. These help relieve inflammation in your bronchial tubes. Your healthcare provider may prescribe an inhaler to help open up the bronchial tubes. Most of the time, acute bronchitis is caused by a viral infection. Antibiotics are usually not prescribed for viral infections.    Drink plenty of fluids, such as water, juice, or warm soup. Fluids loosen mucus so that you can cough it up. This helps you breathe more easily. Fluids also prevent dehydration.    Make sure you get plenty of rest.    Do not smoke. Do not allow anyone else to smoke in your home.  Recovery and follow-up  Follow up with your doctor as you are told. You will likely feel better in a week or two. But a dry cough can linger beyond that time. Let your doctor know if you still have symptoms (other than a dry cough) after 2 weeks, or if you re prone to getting bronchial infections. Take steps to protect yourself from future infections. These steps include stopping smoking and avoiding tobacco smoke, washing your hands often, and getting a yearly flu shot.  When to call your healthcare provider  Call the healthcare provider if you have any of the following:    Fever of 100.4 F (38.0 C) or higher, or as advised    Symptoms that get worse, or new symptoms    Trouble breathing    Symptoms that don t start to improve within a week, or within 3 days of taking antibiotics   Date Last Reviewed: 12/1/2016 2000-2017 [employer]. [address] [place] [address]. All rights reserved. This information is not intended as a substitute for professional medical care. Always follow your healthcare professional's  "instructions.                Follow-ups after your visit        Your next 10 appointments already scheduled     Jun 21, 2018  7:30 AM CDT   HST Drop Off with DME SCHEDULE   [facility]    [address] [place] [id number] [phone]              Who to contact     If you have questions or need follow up information about today's clinic visit or your schedule please contact [facility] directly at [phone].  Normal or non-critical lab and imaging results will be communicated to you by Circalithart, letter or phone within 4 business days after the clinic has received the results. If you do not hear from us within 7 days, please contact the clinic through [employer] or phone. If you have a critical or abnormal lab result, we will notify you by phone as soon as possible.  Submit refill requests through [employer] or call your pharmacy and they will forward the refill request to us. Please allow 3 business days for your refill to be completed.          Additional Information About Your Visit        Circalithart Information     [employer] gives you secure access to your electronic health record. If you see a primary care provider, you can also send messages to your care team and make appointments. If you have questions, please call your primary care clinic.  If you do not have a primary care provider, please call [phone] and they will assist you.        Care EveryWhere ID     This is your Care EveryWhere ID. This could be used by other organizations to access your [place] medical records  [id number]        Your Vitals Were     Pulse Temperature Height Pulse Oximetry BMI (Body Mass Index)       89 98.3  F (36.8  C) (Oral) 5' 11.5\" (1.816 m) 96% 48.82 kg/m2        Blood Pressure from Last 3 Encounters:   06/20/18 114/80   06/20/18 121/77   06/14/18 125/84    Weight from Last 3 Encounters:   06/20/18 (!) 355 lb (161 kg) "   06/20/18 (!) 354 lb (160.6 kg)   06/14/18 (!) 353 lb (160.1 kg)              Today, you had the following     No orders found for display         Today's Medication Changes          These changes are accurate as of 6/20/18  5:02 PM.  If you have any questions, ask your nurse or doctor.               Start taking these medicines.        Dose/Directions    azithromycin 250 MG tablet   Commonly known as:  ZITHROMAX   Used for:  Acute bronchitis with symptoms greater than 10 days   Started by:  Ana Simmons APRN CNP        Two tablets first day, then one tablet daily for four days.   Quantity:  6 tablet   Refills:  0       benzonatate 100 MG capsule   Commonly known as:  TESSALON   Used for:  Acute bronchitis with symptoms greater than 10 days   Started by:  Ana Simmons APRN CNP        Dose:  100 mg   Take 1 capsule (100 mg) by mouth 3 times daily as needed for cough   Quantity:  20 capsule   Refills:  0            Where to get your medicines      These medications were sent to [facility] [address] [place] [address] [address] [facility] [address]  [address] [facility] [address] [facility] [address]     Phone:  [phone]     azithromycin 250 MG tablet    benzonatate 100 MG capsule                Primary Care Provider Office Phone # Fax #    Natali Tucker MD [facility] [phone]       [address]  [facility] [address]        Equal Access to Services     LEONEL [facility] AH: Hadii ivonne ku hadasho Soomaali, waaxda luqadaha, qaybta kaalmada adeegyada, anamaria parnell. So [facility] [phone].    ATENCIÓN: Si habla español, tiene a chao disposición servicios gratuitos de asistencia lingüística. Pia al [phone].    We comply with applicable federal civil rights laws and [place] laws. We do not discriminate on the basis of race, color, national origin, age, disability, sex, sexual orientation, or gender identity.            Thank you!     Thank you for choosing [facility]   for your care. Our goal is always to provide you with excellent care. Hearing back from our patients is one way we can continue to improve our services. Please take a few minutes to complete the written survey that you may receive in the mail after your visit with us. Thank you!             Your Updated Medication List - Protect others around you: Learn how to safely use, store and throw away your medicines at www.disposemymeds.org.          This list is accurate as of 6/20/18  5:02 PM.  Always use your most recent med list.                   Brand Name Dispense Instructions for use Diagnosis    aspirin 81 MG tablet     30 tablet    Take 1 tablet by mouth daily.    Hypertension goal BP (blood pressure) < 140/90       azithromycin 250 MG tablet    ZITHROMAX    6 tablet    Two tablets first day, then one tablet daily for four days.    Acute bronchitis with symptoms greater than 10 days       benzonatate 100 MG capsule    TESSALON    20 capsule    Take 1 capsule (100 mg) by mouth 3 times daily as needed for cough    Acute bronchitis with symptoms greater than 10 days       guaiFENesin-codeine 100-10 MG/5ML Soln solution    ROBITUSSIN AC    236 mL    Take 10 mLs by mouth every 4 hours as needed    Lower respiratory tract infection       hydrochlorothiazide 25 MG tablet    HYDRODIURIL    90 tablet    Take 1 tablet (25 mg) by mouth daily    Hypertension goal BP (blood pressure) < 140/90       lisinopril 40 MG tablet    PRINIVIL/ZESTRIL    90 tablet    Take 1 tablet (40 mg) by mouth daily    Hypertension goal BP (blood pressure) < 140/90

## 2018-06-20 NOTE — PROGRESS NOTES
Sleep Consultation Note:    Date on this visit: 6/20/2018    Armando Domingo is sent by John Sun for a sleep consultation regarding loud snoring and the concern of having sleep apnea.    Primary Physician: Natali Tucker     Armando Domingo 37 year old  male with PMH hypertension, hepatic steatosis, morbid obesity who complains loud snoring, witnessed apneas. This has been going on for 5-8 years.  He has not had a previous sleep study.    Sleep Disordered Breathing  Armando has been told that he snores very loud.  His wife has observed long apneas during which she tries to wake him up.  He experiences frequent choking/gasping for air in sleep.  Wakes up with a very dry mouth and thinks he breathes with his mouth.  Even after 8 or 9-1/2 hours of nightly sleep he wakes up not refreshed and very groggy.  Denies any headaches on a daily basis in the morning.  Has gained around 20 pounds in the last 2 years.  Complaining of significant drowsiness during the day.  He is recently getting over a cold and thinks he might be more dyspneic on exertion than at baseline.  Previous smoker quit 6-7 years ago.  Denies any asthma or COPD diagnosis given to him but does admit that it is hard for him to climb up more than 1 flight of stairs at a time.  No family history of sleep apnea but his father snores very loud.  No surgeries to face/jaw or nasal septum.  Recent BMP with elevated carbon dioxide level of 29.  He is requesting the process of diagnosing him with sleep apnea and treatment to be done as soon as possible as he has to be driving for most of the day due to job-related duties.  He is a  and has to meet multiple clients during the day.  Admits he has a high chance of dozing off if he is ever sitting at his computer which is starting to become very bothersome for himself as well as his employer.    Sleep Schedule/Sleep Complaints  He does not complain of difficulty falling asleep.  His usual  bedtime is 10 PM on weekdays and 11 PM on weekends.  Sleep latency of 2-5 minutes.  His main difficulty is maintaining his sleep as he is awoken numerous times throughout the night mostly due to loud snoring some due to unclear reasons.  It is easy to fall back into sleep each time.  Wakes up with the help of an alarm at 6 AM on weekends and significantly groggy in the morning.  Wakeup time is a 8:30 AM on weekends and does not feel any better with extra sleep.    Medications for sleep: Denies taking any over-the-counter medications for sleep.  Is alcohol dependent and used to drink up to 7-8 alcoholic drinks a night now down to 1 or 2 per day.  He had been using alcohol to aid him in getting better sleep    Patient does not use electronics in bed. Patient does have a regular bed partner, his wife.  Patient does not have any pets in the bedroom at night during sleep.    Sleep Behaviors  He denies any cataplexy, sleep hallucinations. Does tell us that he has experienced 2 times of sleep paralysis when waking up from sleep, especially from naps.   He denies any night time behaviors - sleep walking, sleep talking, sleep eating.  He does not complain acting out dreams.  Patient denies any injury to oneself or others while sleeping.    Daytime Functioning  Armando states he does not like to nap even though he significantly drowsy throughout the day as even short naps can make him more tired and would wake up with headache.  There are occasions he would inadvertently doze off while sitting at his desk or in his couch.  He admits dozing while driving.  Patient's Boyers Sleepiness score 8/24, surprisingly low when compared to the history he gives us today.      Restless legs:  Has some restlessness feelings in his legs which bother him not every day but 1 or 2 times a month.  He is known to be moving a lot in his sleep.  The symptoms are relieved if he is stands up and walks around.  When they do occur they usually get worse  in the evenings and when he is trying to fall asleep in bed.    Social History  Armando currently works as a .  Work schedule is as follows: 8 AM to 5 PM.  He does  not do shift work currently or in the past.   He drinks minimal amount of caffeine throughout the day 1 cup of coffee and one bottle of soda.  Daily alcohol use of at least 1-2 drinks which is significantly lower than what he used to drink a month ago which was 7-8 alcoholic drinks a night.  Previous smoker quit 7 years ago.  Denies any drug abuse or marijuana use.    Allergies:    No Known Allergies    Medications:    Current Outpatient Prescriptions   Medication Sig Dispense Refill     aspirin 81 MG tablet Take 1 tablet by mouth daily. 30 tablet 3     guaiFENesin-codeine (ROBITUSSIN AC) 100-10 MG/5ML SOLN solution Take 10 mLs by mouth every 4 hours as needed 236 mL 1     hydrochlorothiazide (HYDRODIURIL) 25 MG tablet Take 1 tablet (25 mg) by mouth daily 90 tablet 1     lisinopril (PRINIVIL/ZESTRIL) 40 MG tablet Take 1 tablet (40 mg) by mouth daily 90 tablet 1       Problem List:  Patient Active Problem List    Diagnosis Date Noted     Morbid obesity due to excess calories (H) 08/10/2017     Priority: Medium     Hepatic steatosis 07/03/2017     Priority: Medium     Alcohol dependence, continuous (H) 07/03/2017     Priority: Medium     Cervicalgia 07/03/2017     Priority: Medium     Hyperlipidemia LDL goal <130 01/16/2012     Priority: Medium     Hypertension goal BP (blood pressure) < 140/90 01/18/2011     Priority: Medium        Past Medical/Surgical History:  Past Medical History:   Diagnosis Date     Reflux      Past Surgical History:   Procedure Laterality Date     SOFT TISSUE SURGERY      cyst removal       Social History:  Social History     Social History     Marital status: Single     Spouse name: N/A     Number of children: N/A     Years of education: N/A     Occupational History     Not on file.     Social History Main Topics  "    Smoking status: Former Smoker     Packs/day: 0.10     Years: 15.00     Types: Cigarettes     Start date: 1/1/1996     Quit date: 1/1/2013     Smokeless tobacco: Never Used     Alcohol use 24.0 oz/week     40 Standard drinks or equivalent per week      Comment: advised cutting down 7/3/17     Drug use: No     Sexual activity: Yes     Partners: Female      Comment: none     Other Topics Concern     Not on file     Social History Narrative       Family History:  Family History   Problem Relation Age of Onset     Diabetes Mother      Hypertension Mother      Cancer - colorectal Maternal Grandfather      Diabetes Paternal Grandmother      Cerebrovascular Disease Paternal Grandmother      C.A.D. Father      Asthma No family hx of      Breast Cancer No family hx of      Prostate Cancer No family hx of      Thyroid Disease No family hx of      Lipids No family hx of        Review of Systems:  General: no fever, chills, weakness  HEENT: No loss of hearing, vertigo, ear ringing, double/blurry vision, sore throat, epistaxis  Pulmonary: Some dyspnea on exertion.  Recently getting over a cold.  Denies any wheezing.    CVS: No chest discomfort, palpitations  GI: No diarrhea, constipation, dysphagia, early satiety, bleeding  Renal: No pain on urination, hematuria  Musculoskeletal: No muscle ache, joint pain, swelling  Skin: No rash, ecchymosis, itching, icterus  Neurology: No tingling, weakness, numbness  Heme: No easy bruisibility or bleeding  Allergy: occasional runny nose, sneezing, urticeria  Psychiatry: no change in mood and affect or depression/ anxiety    Physical Examination:  Vitals: /77  Pulse 88  Resp 18  Ht 1.816 m (5' 11.5\")  Wt (!) 160.6 kg (354 lb)  SpO2 95%  BMI 48.68 kg/m2  BMI= Body mass index is 48.68 kg/(m^2).    Neck Cir (cm): 53 cm    [place] Total Score 6/20/2018   Total score - [place] 9       General appearance: No apparent distress, morbidly obese, well groomed.    HEENT:   Head: " Normocephalic, atraumatic.  Eyes: PERRL  Nose: Nares patent.  No exudate.  No septal deviation noted.  Mouth: Teeth: normal   Tongue: large  Oropharynx:  Mallampati Classification: 4    Tonsils: 1+    Uvula: normal    Neck: Supple without bruit.     Neck Cir (cm): 53 cm (6/20/2018  8:00 AM)    Cardiac: Regular rate and rhythm.  No murmurs.  Radial pulses are strong and symmetric.  Pulmonary: Symmetric air movement, lungs clear to auscultation bilaterally.  Musculoskeletal: no edema noted.  No clubbing or cyanosis.  Skin: Warm, dry, intact.  Neurologic: Alert and oriented to person, place and time   Gait is normal.  Psychiatric: Affect normal.  Mood normal.    Impression/Plan:    Snoring  Observed sleep apnea  Excessive daytime sleepiness  Sleep fragmentation  Restless leg syndrome  Isolated sleep paralysis    Patient is being evaluated for AMADEO, in the context of morbid obesity, very loud snoring, observed apneas and excessive daytime sleepiness.  STOP BANG score is 7. Physical exam significant for crowded oropharynx.  BMI of 48.  Recent carbon dioxide level on BMP 29.    We will start with HST as patient is high risk for AMADEO, to expedite the process and start management of his sleep apnea.  Patient also requesting testing and management sooner than later as he has to be driving for his job throughout the day and would like to avoid any motor vehicle accidents.  If severe sleep apnea found we will call the patient to deliver the results as well as to get consent for initiation of Pap therapy.  Follow-up with an overnight oximetry while he is using CPAP to monitor for any residual hypoxemia at night, due to the concern of the possibility of having hypoventilation.  His daytime oxygen saturations have been around 95-97%, not very indicative of hypoventilation during daytime.   Diagnosis and treatment for AMADEO have been discussed. Complications of untreated AMADEO have also been discussed.    Patient was strongly advised to  avoid driving, operating any heavy machinery or other hazardous situations while drowsy or sleepy.  Patient was counseled on the importance of driving while alert, to pull over if drowsy, or nap before getting into the vehicle if sleepy.  Pt states he can not stop driving as his job as a  needs him to be driving to multiple sites per day to meet clients.    Patient is a former smoker and has been encouraged to continue to not smoke. Encourage weight loss, healthy diet, and exercise.    CC: John Sun    Seen and examined with Dr. Lyn Andre MD  Sleep medicine fellow    Attending: Patient seen and examined and personally counseled.  High risk for AMADEO, HST to expedite diagnosis and treatment. Accept that may not be able to identify mild hypoventilation of HST. This note reflects our mutual assessment and plan.  Benita Nicholson MD  Pulmonary, Critical Care, and Sleep Medicine

## 2018-06-20 NOTE — PATIENT INSTRUCTIONS
My home sleep study:    ______I will activate the device as shown on the video    ___X___My device is programmed to start automatically at     ___10:00 pm___________ Time   on ___6/20/18___________  Day/Date    My contact number if I have problems is _______[phone]____________________________      I will watch the video before I hook it up at night: [email or web address]    In case of an emergency call 911

## 2018-06-20 NOTE — MR AVS SNAPSHOT
After Visit Summary   6/20/2018    Armando Domingo    MRN: [id number]           Patient Information     Date Of Birth          1980        Visit Information        Provider Department      6/20/2018 8:00 AM [facility], [place], Sleep Study        Today's Diagnoses     Snoring    -  1    History of snoring        Excessive daytime sleepiness          Care Instructions      Your BMI is Body mass index is 48.68 kg/(m^2).  Weight management is a personal decision.  If you are interested in exploring weight loss strategies, the following discussion covers the approaches that may be successful. Body mass index (BMI) is one way to tell whether you are at a healthy weight, overweight, or obese. It measures your weight in relation to your height.  A BMI of 18.5 to 24.9 is in the healthy range. A person with a BMI of 25 to 29.9 is considered overweight, and someone with a BMI of 30 or greater is considered obese. More than two-thirds of American adults are considered overweight or obese.  Being overweight or obese increases the risk for further weight gain. Excess weight may lead to heart disease and diabetes.  Creating and following plans for healthy eating and physical activity may help you improve your health.  Weight control is part of healthy lifestyle and includes exercise, emotional health, and healthy eating habits. Careful eating habits lifelong are the mainstay of weight control. Though there are significant health benefits from weight loss, long-term weight loss with diet alone may be very difficult to achieve- studies show long-term success with dietary management in less than 10% of people. Attaining a healthy weight may be especially difficult to achieve in those with severe obesity. In some cases, medications, devices and surgical management might be considered.  What can you do?  If you are overweight or obese and are interested in methods for weight loss, you should discuss this  with your provider.     Consider reducing daily calorie intake by 500 calories.     Keep a food journal.     Avoiding skipping meals, consider cutting portions instead.    Diet combined with exercise helps maintain muscle while optimizing fat loss. Strength training is particularly important for building and maintaining muscle mass. Exercise helps reduce stress, increase energy, and improves fitness. Increasing exercise without diet control, however, may not burn enough calories to loose weight.       Start walking three days a week 10-20 minutes at a time    Work towards walking thirty minutes five days a week     Eventually, increase the speed of your walking for 1-2 minutes at time    In addition, we recommend that you review healthy lifestyles and methods for weight loss available through the National Institutes of Health patient information sites:  http://win.niddk.nih.gov/publications/index.htm    And look into health and wellness programs that may be available through your health insurance provider, employer, local community center, or gabriel club.    Weight management plan: Patient was referred to their PCP to discuss a diet and exercise plan.              Follow-ups after your visit        Your next 10 appointments already scheduled     Jun 21, 2018  7:30 AM CDT   HST Drop Off with SLEEP STUDY RM 7   [facility], Sleep Study ([facility])    [address] [place] [id number] [phone]              Future tests that were ordered for you today     Open Future Orders        Priority Expected Expires Ordered    HST-Home Sleep Apnea Test Routine  12/20/2018 6/20/2018            Who to contact     If you have questions or need follow up information about today's clinic visit or your schedule please contact [facility] directly at [phone].  Normal or non-critical lab and imaging results will be communicated to you by Mariihart, letter  "or phone within 4 business days after the clinic has received the results. If you do not hear from us within 7 days, please contact the clinic through [email or web address] or phone. If you have a critical or abnormal lab result, we will notify you by phone as soon as possible.  Submit refill requests through [email or web address] or call your pharmacy and they will forward the refill request to us. Please allow 3 business days for your refill to be completed.          Additional Information About Your Visit        [email or web address] Information     [email or web address] gives you secure access to your electronic health record. If you see a primary care provider, you can also send messages to your care team and make appointments. If you have questions, please call your primary care clinic.  If you do not have a primary care provider, please call [phone] and they will assist you.        Care EveryWhere ID     This is your Care EveryWhere ID. This could be used by other organizations to access your [facility] medical records  [id number]        Your Vitals Were     Pulse Respirations Height Pulse Oximetry BMI (Body Mass Index)       88 18 1.816 m (5' 11.5\") 95% 48.68 kg/m2        Blood Pressure from Last 3 Encounters:   06/20/18 121/77   06/14/18 125/84   06/12/18 129/86    Weight from Last 3 Encounters:   06/20/18 (!) 160.6 kg (354 lb)   06/14/18 (!) 160.1 kg (353 lb)   06/12/18 (!) 161.6 kg (356 lb 4.8 oz)              We Performed the Following     SLEEP EVALUATION & MANAGEMENT REFERRAL - ADULT -[facility] [place]  [phone] (Age 15 and up)        Primary Care Provider Office Phone # Fax #    Natali Tucker MD PhD [phone]       [address]  [facility] [address]        Equal Access to Services     GENO WINTERS : Alvin Trevizo, justa silvestre, qayadi kaanamaria rader. So [facility] [phone].    ATENCIÓN: Si habla español, tiene a chao disposición servicios gratuitos " de asistencia lingüística. Pia thkakar [phone].    We comply with applicable federal civil rights laws and [place] laws. We do not discriminate on the basis of race, color, national origin, age, disability, sex, sexual orientation, or gender identity.            Thank you!     Thank you for choosing [facility], [place], SLEEP STUDY  for your care. Our goal is always to provide you with excellent care. Hearing back from our patients is one way we can continue to improve our services. Please take a few minutes to complete the written survey that you may receive in the mail after your visit with us. Thank you!             Your Updated Medication List - Protect others around you: Learn how to safely use, store and throw away your medicines at www.disposemymeds.org.          This list is accurate as of 6/20/18  9:46 AM.  Always use your most recent med list.                   Brand Name Dispense Instructions for use Diagnosis    aspirin 81 MG tablet     30 tablet    Take 1 tablet by mouth daily.    Hypertension goal BP (blood pressure) < 140/90       guaiFENesin-codeine 100-10 MG/5ML Soln solution    ROBITUSSIN AC    236 mL    Take 10 mLs by mouth every 4 hours as needed    Lower respiratory tract infection       hydrochlorothiazide 25 MG tablet    HYDRODIURIL    90 tablet    Take 1 tablet (25 mg) by mouth daily    Hypertension goal BP (blood pressure) < 140/90       lisinopril 40 MG tablet    PRINIVIL/ZESTRIL    90 tablet    Take 1 tablet (40 mg) by mouth daily    Hypertension goal BP (blood pressure) < 140/90

## 2018-06-20 NOTE — MR AVS SNAPSHOT
After Visit Summary   6/20/2018    Armando Domingo    MRN: [id number]           Patient Information     Date Of Birth          1980        Visit Information        Provider Department      6/20/2018 9:30 AM [facility]        Care Instructions    My home sleep study:    ______I will activate the device as shown on the video    ___X___My device is programmed to start automatically at     ___10:00 pm___________ Time   on ___6/20/18___________  Day/Date    My contact number if I have problems is _______[phone]____________________________      I will watch the video before I hook it up at night: https://[email or web address]    In case of an emergency call 911                    Follow-ups after your visit        Your next 10 appointments already scheduled     Jun 21, 2018  7:30 AM CDT   HST Drop Off with SLEEP STUDY RM 7   [facility]    [address] [place] [address] [phone]              Future tests that were ordered for you today     Open Future Orders        Priority Expected Expires Ordered    HST-Home Sleep Apnea Test Routine  12/20/2018 6/20/2018            Who to contact     If you have questions or need follow up information about today's clinic visit or your schedule please [facility] directly at [phone].  Normal or non-critical lab and imaging results will be communicated to you by MyChart, letter or phone within 4 business days after the clinic has received the results. If you do not hear from us within 7 days, please contact the clinic through MyChart or phone. If you have a critical or abnormal lab result, we will notify you by phone as soon as possible.  Submit refill requests through [employer] or call your pharmacy and they will forward the refill request to us. Please allow 3 business days for your refill to be completed.           Additional Information About Your Visit        [employer] Information     [employer] gives you secure access to your electronic health record. If you see a primary care provider, you can also send messages to your care team and make appointments. If you have questions, please call your primary care clinic.  If you do not have a primary care provider, please call [phone] and they will assist you.        Care EveryWhere ID     This is your Care EveryWhere ID. This could be used by other organizations to access your [facility] medical records  [id number]         Blood Pressure from Last 3 Encounters:   06/20/18 121/77   06/14/18 125/84   06/12/18 129/86    Weight from Last 3 Encounters:   06/20/18 (!) 160.6 kg (354 lb)   06/14/18 (!) 160.1 kg (353 lb)   06/12/18 (!) 161.6 kg (356 lb 4.8 oz)              Today, you had the following     No orders found for display       Primary Care Provider Office Phone # Fax #    Natali Tucker MD [facility] [phone]       [address]  [facility] [address]        Equal Access to Services     [facility]: Hadii aad ku hadasho Soomaali, waaxda luqadaha, qaybta kaalmada adeegyada, anamaria cardona . So [facility] [phone].    ATENCIÓN: Si habla español, tiene a chao disposición servicios gratuitos de asistencia lingüística. [facility] [phone].    We comply with applicable federal civil rights laws and [place] laws. We do not discriminate on the basis of race, color, national origin, age, disability, sex, sexual orientation, or gender identity.            Thank you!     Thank you for choosing [facility], SLEEP STUDY  for your care. Our goal is always to provide you with excellent care. Hearing back from our patients is one way we can continue to improve our services. Please take a few minutes to complete the written survey that you may receive in the mail after your visit with us. Thank you!             Your Updated Medication List - Protect others  around you: Learn how to safely use, store and throw away your medicines at www.disposemymeds.org.          This list is accurate as of 6/20/18  9:44 AM.  Always use your most recent med list.                   Brand Name Dispense Instructions for use Diagnosis    aspirin 81 MG tablet     30 tablet    Take 1 tablet by mouth daily.    Hypertension goal BP (blood pressure) < 140/90       guaiFENesin-codeine 100-10 MG/5ML Soln solution    ROBITUSSIN AC    236 mL    Take 10 mLs by mouth every 4 hours as needed    Lower respiratory tract infection       hydrochlorothiazide 25 MG tablet    HYDRODIURIL    90 tablet    Take 1 tablet (25 mg) by mouth daily    Hypertension goal BP (blood pressure) < 140/90       lisinopril 40 MG tablet    PRINIVIL/ZESTRIL    90 tablet    Take 1 tablet (40 mg) by mouth daily    Hypertension goal BP (blood pressure) < 140/90

## 2018-06-20 NOTE — PROGRESS NOTES
"  SUBJECTIVE:   Armando Domingo is a 37 year old male who presents to clinic today for the following health issues:      Concern - Recheck Cough  Onset: 2 weeks ago    Description:   Cough not getting better, brownish-green sputum    Intensity: severe    Progression of Symptoms:  same    Accompanying Signs & Symptoms:  Pain in abdomen and headache when coughing    Previous history of similar problem:   Yes    Precipitating factors:   Worsened by: talking, breathing through mouth    Alleviating factors:  Improved by: None    Therapies Tried and outcome: Robitussin AC helps at night    37 year old male with the above symptoms. Was seen on 6/14 for URI and treated with Robitussin at . Returns today with minimal improvement. Reports cough is worse during the day as he continues Robitussin at night. He has a decrease in sputum and what he does cough up he has swallowed so he is unsure if it is still green/brown. He has related abdominal pain in his abdominal muscles from coughing for so many days. He also gets a headache when he has \"coughing fits\".  Denies any SOB but does have a tickle in the back of his throat constantly. Feels he has to breath through his nose to keep from coughing. Denies any other congestion.     Problem list and histories reviewed & adjusted, as indicated.  Additional history: none    Patient Active Problem List   Diagnosis     Hypertension goal BP (blood pressure) < 140/90     Hyperlipidemia LDL goal <130     Hepatic steatosis     Alcohol dependence, continuous (H)     Cervicalgia     Morbid obesity due to excess calories (H)     Past Surgical History:   Procedure Laterality Date     SOFT TISSUE SURGERY      cyst removal       Social History   Substance Use Topics     Smoking status: Former Smoker     Packs/day: 0.10     Years: 15.00     Types: Cigarettes     Start date: 1/1/1996     Quit date: 1/1/2013     Smokeless tobacco: Never Used     Alcohol use 24.0 oz/week     40 Standard drinks or " equivalent per week      Comment: advised cutting down 7/3/17     Family History   Problem Relation Age of Onset     Diabetes Mother      Hypertension Mother      Cancer - colorectal Maternal Grandfather      Diabetes Paternal Grandmother      Cerebrovascular Disease Paternal Grandmother      C.A.D. Father      Snoring Father      Asthma No family hx of      Breast Cancer No family hx of      Prostate Cancer No family hx of      Thyroid Disease No family hx of      Lipids No family hx of          Current Outpatient Prescriptions   Medication Sig Dispense Refill     aspirin 81 MG tablet Take 1 tablet by mouth daily. 30 tablet 3     azithromycin (ZITHROMAX) 250 MG tablet Two tablets first day, then one tablet daily for four days. 6 tablet 0     benzonatate (TESSALON) 100 MG capsule Take 1 capsule (100 mg) by mouth 3 times daily as needed for cough 20 capsule 0     guaiFENesin-codeine (ROBITUSSIN AC) 100-10 MG/5ML SOLN solution Take 10 mLs by mouth every 4 hours as needed 236 mL 1     hydrochlorothiazide (HYDRODIURIL) 25 MG tablet Take 1 tablet (25 mg) by mouth daily 90 tablet 1     lisinopril (PRINIVIL/ZESTRIL) 40 MG tablet Take 1 tablet (40 mg) by mouth daily 90 tablet 1     No Known Allergies  BP Readings from Last 3 Encounters:   06/20/18 114/80   06/20/18 121/77   06/14/18 125/84    Wt Readings from Last 3 Encounters:   06/20/18 (!) 355 lb (161 kg)   06/20/18 (!) 354 lb (160.6 kg)   06/14/18 (!) 353 lb (160.1 kg)                  Labs reviewed in EPIC    Reviewed and updated as needed this visit by clinical staff  Tobacco  Allergies  Meds  Problems  Med Hx  Surg Hx  Fam Hx  Soc Hx        Reviewed and updated as needed this visit by Provider  Allergies  Meds  Problems         ROS:  Constitutional, HEENT, cardiovascular, pulmonary, gi and gu systems are negative, except as otherwise noted.    OBJECTIVE:     /80 (BP Location: Right arm, Patient Position: Chair, Cuff Size: Adult Large)  Pulse 89   "Temp 98.3  F (36.8  C) (Oral)  Ht 5' 11.5\" (1.816 m)  Wt (!) 355 lb (161 kg)  SpO2 96%  BMI 48.82 kg/m2  Body mass index is 48.82 kg/(m^2).  GENERAL: healthy, alert and no distress  HENT: ear canals and TM's normal, nose and mouth without ulcers or lesions  NECK: no adenopathy, no asymmetry, masses, or scars and thyroid normal to palpation  RESP: lungs clear to auscultation, diminished in the bases - no rales, rhonchi or wheezes  CV: regular rate and rhythm, normal S1 S2, no S3 or S4, no murmur, click or rub, no peripheral edema and peripheral pulses strong  PSYCH: mentation appears normal, affect normal/bright    Diagnostic Test Results:  none     ASSESSMENT/PLAN:     1. Acute bronchitis with symptoms greater than 10 days  Prolonged symptoms with no improvement indicative of more infectious process.   - azithromycin (ZITHROMAX) 250 MG tablet; Two tablets first day, then one tablet daily for four days.  Dispense: 6 tablet; Refill: 0  - benzonatate (TESSALON) 100 MG capsule; Take 1 capsule (100 mg) by mouth 3 times daily as needed for cough  Dispense: 20 capsule; Refill: 0    See Patient Instructions    Push fluids, rest  Start antibiotic today. Know that your cough may not be completely gone when you're done with your antibiotics  Start benzonatate 1 tab 3 times daily as needed for cough  Continue cheratussin at night as needed  You can use a humidifier by your bed at night. Distilled water should be used with the humidifier.  Tylenol or ibuprofen as needed for pain or fever  Follow up if you are worsening or not improving in 5 days, sooner if needed    Patient verbalizes understanding and agrees with plan of care. Patient stable for discharge.  The benefits, risks and potential side effects were discussed in detail.   Black box warnings discussed as relevant. All patient questions were answered. The patient was instructed to follow up immediately if any adverse reactions develop.   Marybel Ross, RN DNP " Student.     I, Ana Simmons, was present with the nurse practitioner student who participated in the service and in the documentation of the note.  I have verified the history and personally performed the physical exam and medical decision making.  I agree with the assessment and plan of care as documented in the note.      Items personally reviewed: vitals.      JAYE George [facility]

## 2018-06-20 NOTE — PATIENT INSTRUCTIONS

## 2018-06-21 ENCOUNTER — DOCUMENTATION ONLY (OUTPATIENT)
Dept: SLEEP MEDICINE | Facility: CLINIC | Age: 38
End: 2018-06-21
Payer: COMMERCIAL

## 2018-06-21 ENCOUNTER — MYC MEDICAL ADVICE (OUTPATIENT)
Dept: SLEEP MEDICINE | Facility: CLINIC | Age: 38
End: 2018-06-21

## 2018-06-21 DIAGNOSIS — G47.33 OSA (OBSTRUCTIVE SLEEP APNEA): ICD-10-CM

## 2018-06-21 NOTE — PROGRESS NOTES
This HST performed using a Noxturnal T3 device which recorded snore, sound, movement activity, body position, nasal pressure, oronasal thermal airflow, pulse, oximetry and both chest and abdominal respiratory effort. HST data was confined to the time patients states they were in bed.   Patient was score using 1B rules. Patient respiratory events showed an AHI of 116.1 with loud snoring. Overall signal quality was good.    Pt will follow up with sleep provider to determine appropriate therapy.

## 2018-06-21 NOTE — PROCEDURES
"HOME SLEEP STUDY INTERPRETATION    Patient: Armando Domingo  MRN: [id number]  YOB: 1980  Study Date: 6/20/2018  Referring Provider: Ferdinand Tucker MD  Ordering Provider: MD Violet Miller MD     Indications for Home Study: Armando Domingo is a 37 year old male with a history of hypertension who presents with symptoms suggestive of obstructive sleep apnea.    Estimated body mass index is 48.82 kg/(m^2) as calculated from the following:    Height as of an earlier encounter on 6/20/18: 1.816 m (5' 11.5\").    Weight as of an earlier encounter on 6/20/18: 161 kg (355 lb).  Total score - [place]: 9 (6/20/2018  7:00 AM)  StopBang Total Score: 7 (6/21/2018 10:00 AM)    Data: A full night home sleep study was performed recording the standard physiologic parameters including body position, movement, sound, nasal pressure, thermal oral airflow, chest and abdominal movements with respiratory inductance plethysmography, and oxygen saturation by pulse oximetry. Pulse rate was estimated by oximetry recording. This study was considered adequate based on > 4 hours of quality oximetry and respiratory recording. As specified by the AASM Manual for the Scoring of Sleep and Associated events, version 2.3, Rule VIII.D 1B, 4% oxygen desaturation scoring for hypopneas is used as a standard of care on all home sleep apnea testing.    Analysis Time:  434 minutes    Respiration:   Sleep Associated Hypoxemia: sustained hypoxemia was present. Baseline oxygen saturation was 95%.  Time with saturation less than or equal to 88% was 270 minutes. The lowest oxygen saturation was 59%.   Snoring: Snoring was present.  Respiratory events: The home study revealed a presence of 751 obstructive apneas and 56 mixed and central apneas. There were 33 hypopneas resulting in a combined apnea/hypopnea index [AHI] of 116.1 events per hour.  AHI was 117.4 per hour supine, 113.8 per hour on left side.  Pattern: Excluding events noted " above, respiratory rate and pattern was notable for regular apneas-see below.        Position: Percent of time spent: supine - 63%, prone - 0%, on left - 36%, on right - 0%.    Heart Rate: By pulse oximetry normal rate was noted.     Assessment:   Very severe obstructive sleep apnea.  Sleep associated hypoxemia was present.    Recommendations:  Consider auto-CPAP at 8-20 cmH2O or polysomnography with full night PAP titration.  Suggest optimizing sleep hygiene and avoiding sleep deprivation.  Weight management.    Diagnosis Code(s): Obstructive Sleep Apnea G47.33, Hypoxemia G47.36    Benita Nicholson MD, June 21, 2018   Diplomate, American Board of Internal Medicine, Sleep Medicine

## 2018-06-22 PROBLEM — G47.33 OSA (OBSTRUCTIVE SLEEP APNEA): Status: ACTIVE | Noted: 2018-06-22

## 2018-06-22 NOTE — TELEPHONE ENCOUNTER
Patient left voicemail on 06/21 at 4:12 PM stating that he was returning a voicemail he received about his sleep study and something about a cpap. Please call the patient at [phone].    Dionne Salazar [facility] ~[place]

## 2018-06-28 ENCOUNTER — DOCUMENTATION ONLY (OUTPATIENT)
Dept: SLEEP MEDICINE | Facility: CLINIC | Age: 38
End: 2018-06-28
Payer: COMMERCIAL

## 2018-06-28 NOTE — PROGRESS NOTES
Patient was offered choice of vendor and chose [facility].  Patient Armando Domingo was set up at [place] on June 28, 2018. Patient received a Resmed AirSense 10 Auto. Pressures were set at 8-20 cm H2O.   Patient s ramp is 5 cm H2O for Auto and FLEX/EPR is EPR, 2.  Patient received a [employer] Mask name: Eson 2  Nasal mask Size Medium, heated tubing and heated humidifier.  Patient is enrolled in the STM Program and does not need to meet compliance. Patient was informed to scheduled follow up appointment with Dr. Nicholson.    Nieves Hernandez

## 2018-07-02 ENCOUNTER — DOCUMENTATION ONLY (OUTPATIENT)
Dept: SLEEP MEDICINE | Facility: CLINIC | Age: 38
End: 2018-07-02

## 2018-07-02 NOTE — PROGRESS NOTES
Patient returned call.     Subjective measures: Pt states that things aren't going well. He is struggling with being breathing both inhaling and exhaling.  He is also struggling with the air feeling too warm.  He's going to turn down his tube temp. He also feels like it causes him to panic and not fall asleep.  He will try it during the day to see if that helps get used to the pressure. Patient failing following subjective benchmarks: pressure issues    Action plan:pt to have 14 day [facility] visit.

## 2018-07-02 NOTE — PROGRESS NOTES
3 DAY STM VISIT    Diagnostic AHI:  116.1    Patient contacted for 3 day STM visit  Message left for patient to return call     Device type: Auto-CPAP  PAP settings from order::  CPAP min 8 cm  H20       CPAP max 20 cm  H20  Mask type:    Nasal Mask     Device settings from machine      Min CPAP 8.0            Max CPAP 20.0      Assessment: Nightly usage, most nights under four hours.  Action plan: Pt to have f/u 14 day STM visit.  Patient has a follow up visit scheduled:   yes within 31-90 days of set up.

## 2018-07-13 ENCOUNTER — DOCUMENTATION ONLY (OUTPATIENT)
Dept: SLEEP MEDICINE | Facility: CLINIC | Age: 38
End: 2018-07-13

## 2018-07-13 NOTE — PROGRESS NOTES
14 DAY STM VISIT    Diagnostic AHI:   116.1     Message left for patient to return call     Assessment: Pt not meeting objective benchmarks for compliance     Action plan: waiting for patient to return call.  and pt to have 30 day STM visit.    Device type: Auto-CPAP  PAP settings: CPAP min 8.0 cm  H20     CPAP max 20.0 cm  H20    95th% pressure 15.1 cm   Mask type:  Nasal Mask  Objective measures: 14 day rolling measures      Compliance  7 %      Leak  24.86 lpm  last  upload      AHI 10.2   last  upload      Average number of minutes 65     Average hours of usage 1.1          Objective measure goal  Compliance   Goal >70%  Leak   Goal < 24 lpm  AHI  Goal < 5  Usage  Goal >240

## 2018-07-30 ENCOUNTER — DOCUMENTATION ONLY (OUTPATIENT)
Dept: SLEEP MEDICINE | Facility: CLINIC | Age: 38
End: 2018-07-30

## 2018-07-30 NOTE — PROGRESS NOTES
30 DAY STM VISIT    Diagnostic AHI:  116.1    Message left for patient to return call     Assessment: Pt not meeting objective benchmarks for compliance     Action plan: waiting for patient to return call and 2 week STM recheck appt scheduled  Patient has a follow up visit with Dr. Nicholson on 8/23/18.   Device type: Auto-CPAP  PAP settings: CPAP min 8.0 cm  H20     CPAP max 20.0 cm  H20    95th% pressure 13.8 cm  H20   Mask type:  Nasal Mask  Objective measures: 14 day rolling measures      Compliance  7 %      Leak  33.2 lpm  last  upload      AHI 6.79   last  upload      Average number of minutes 89      Objective measure goal  Compliance   Goal >70%  Leak   Goal < 24 lpm  AHI  Goal < 5  Usage  Goal >240

## 2018-08-02 NOTE — PROGRESS NOTES
Patient returned call.     Subjective measures: Pt would like to try a FFM and see if that helps him keep it on longer.  He states he opens his mouth and it wakes him up.  He also finds it really hard to exhale against the pressure.  I changed his EPR to 3 via his modem.  He will call back to schedule a mask fitting appointment. Patient failing following subjective benchmarks: pressure issues, leak issues  and mask discomfort     Action plan:schedule mask fit appointment and follow up per provider request

## 2018-08-06 ENCOUNTER — DOCUMENTATION ONLY (OUTPATIENT)
Dept: SLEEP MEDICINE | Facility: CLINIC | Age: 38
End: 2018-08-06
Payer: COMMERCIAL

## 2018-08-06 DIAGNOSIS — G47.33 OSA (OBSTRUCTIVE SLEEP APNEA): Primary | ICD-10-CM

## 2018-08-06 NOTE — PROGRESS NOTES
Patient came to [facility] for mask fitting appointment on August 6, 2018. Patient requested to switch masks because oral breathing.  Patient tried on the followings masks: Resmed Airfit F20 Full Face mask, sizes Small & Medium.  Patient selected [employer], type Simplus Full Face mask, size Small cushion with Medium/Large headgear.  Patient preferred the F20, but we could not get a good seal.  Gained moderate seal with the Simplus.  Mask clarification request sent to Dr. Nicholson.

## 2018-08-14 ENCOUNTER — DOCUMENTATION ONLY (OUTPATIENT)
Dept: SLEEP MEDICINE | Facility: CLINIC | Age: 38
End: 2018-08-14

## 2018-08-14 NOTE — PROGRESS NOTES
STM recheck     Diagnostic AHI:   116.1 HST    Message left for patient to return call     Assessment: Pt not meeting objective benchmarks for compliance and AHI Recent mask exchange with little usage since receiving new mask.  He has a follow up visit scheduled 8/23/2018     Action plan: waiting for patient to return call.      Device type: Auto-CPAP  PAP settings: CPAP min 8.0 cm  H20     CPAP max 20.0 cm  H20    95th% pressure 17.4 cm   Mask type:  Full Face Mask  Objective measures: 14 day rolling measures      Compliance  14 %      Leak  22.8 lpm  last  upload      AHI 7.43   last  upload      Average number of minutes 55     Average hours of usage 0.9          Objective measure goal  Compliance   Goal >70%  Leak   Goal < 24 lpm  AHI  Goal < 5  Usage  Goal >240

## 2018-08-23 ENCOUNTER — OFFICE VISIT (OUTPATIENT)
Dept: SLEEP MEDICINE | Facility: CLINIC | Age: 38
End: 2018-08-23
Payer: COMMERCIAL

## 2018-08-23 VITALS
BODY MASS INDEX: 44.1 KG/M2 | HEIGHT: 71 IN | DIASTOLIC BLOOD PRESSURE: 71 MMHG | SYSTOLIC BLOOD PRESSURE: 117 MMHG | WEIGHT: 315 LBS | OXYGEN SATURATION: 95 % | HEART RATE: 90 BPM | RESPIRATION RATE: 18 BRPM

## 2018-08-23 DIAGNOSIS — E66.01 MORBID OBESITY DUE TO EXCESS CALORIES (H): ICD-10-CM

## 2018-08-23 DIAGNOSIS — G47.33 OSA (OBSTRUCTIVE SLEEP APNEA): Primary | ICD-10-CM

## 2018-08-23 PROCEDURE — 99215 OFFICE O/P EST HI 40 MIN: CPT | Performed by: INTERNAL MEDICINE

## 2018-08-23 NOTE — NURSING NOTE
Order sent to [facility] for cpap supplies.    Dionne Salazar [facility] ~[place]

## 2018-08-23 NOTE — PATIENT INSTRUCTIONS
1.  Continue CPAP every night for all hours that you are sleeping.  If you nap use CPAP.  As always, try to get at least 8 hours of sleep or more each day, and avoid sleep deprivation. Avoid alcohol.    2.  Reasons that you might need a change to your pressure therapy would be weight gain or loss, waking having inadvertently removed your CPAP overnight, having previously felt refreshed by sleep with CPAP use and now waking un-refreshed, and return of daytime sleepiness. Also, the development of new medical problems can sometimes affect breathing at night-heart failure, stroke, medications such as narcotics.    3.  Please bring CPAP with you if you are hospitalized.  If anticipating surgery be sure to discuss with your surgeon that you have sleep apnea and use PAP therapy.      4.  Maintain your equipment as recommended which includes routine cleaning and replacement of supplies.  Call DME for any questions regarding supplies or maintenance.      5.  Do not drive on engage in potentially dangerous activities if feeling sleepy.    6.  Please see me again in 2 months and bring your machine and card to your follow-up visit.      [facility] DME and cpap specialist [phone]  [facility] [phone]    [facility] D[facility] [phone], CPAP specialist [phone]  [facility] [phone]    [facility], [facility] [phone]    [facility]  Radha Domingo [phone]  [facility] [place] [facility] [place] [phone]  [facility] phone [phone]         Tips for your CPAP and BIPAP use-    Mask fitting tips  Mask fitting exercise:    To improve your mask seal and your mobility at night, put mask on and secure in place.  Lie down in bed with full pressure and roll to one side, adjust headgear while in that position to eliminate any  leaks. Repeat process rolling to other side.     The mask seal does not have to be perfect:   CPAP machines are designed to make up for small leaks. However, you will not tolerate leaks blowing in your eyes so you will need to adjust.   Any leak should only be near or at the bottom of the mask.  We expect your mask to leak slightly at night.    Do not over-tighten the headgear straps, tighter IS NOT better, we expect minimal leak.    First try re-positioning the mask or headgear before tightening the headgear straps.  Mask leaks are expected due to changing sleeping positions. Try pulling the mask away from your skin allowing the cushion to re-inflate will minimize the leak.  If you struggle for a good fit, try turning the CPAP off and then readjust the mask by pulling it away from your face and then turning back on the CPAP.        Humidifier tips  Humidifiers can be adjusted to increase or decrease the amount of moisture according to your comfort level. You may need to adjust this frequently at first, but then might only change it with seasonal weather changes.     Try INCREASING the humidity if:  You experience a dry, irritated nasal passage or throat.  You have a runny, drippy nose or sneezing fits after using CPAP.  You experience nasal congestion during or after CPAP use.    Try DECREASING the humidity if:  You have excessive condensation or  rain out  in the tubing or mask.  Otherwise keep the tubing warm during the night by running it underneath the blankets or pillow.      Clinic visit after initial CPAP and BIPAP set-up   Bring your equipment with you to your 4 week follow up clinic visit.  We will be extracting your data from the machine.        Travel  Always take your equipment with you.  If you fly with your equipment bring it on with you as a carry on.  Medical equipment does not count as a carry on.    If you travel international the machines take 110-240.  The only adapter needed is the adapter that  will fit into the receptacle (outlet).    You may also want to bring an extension cord as many hotel rooms have limited outlets at the bedside.  Do not travel with water in your humidifier chamber.     Cleaning and Maintenance Guidelines    Equipment Frequency Cleaning Method   Mask First Day    Daily      Weekly Soak mask in hot soapy water for 30 minutes, rinse and air dry.  Wipe nasal cushion with a hot soapy (Ivory, baby shampoo) cloth and rinse.  Baby wipes may also be used.  Do not use anti-bacterial soaps,Leanna  liquid soap, rubbing alcohol, bleach or ammonia.  Wash frame in hot soapy water (Ivory, baby shampoo) rinse and let air dry   Headgear Biweekly Wash in hot soapy water, rinse and air dry   Reusable Gray Filter Weekly Wash in hot soapy water, rinse, put in towel squeeze moisture out, let air dry   Disposable White Filter Check Weekly Replace when brown or gray in color; at least every 2 to 3 months   Humidifier Chamber Daily    Weekly Empty distilled water from humidifier and let air dry    Hand wash in hot soapy water, rinse and air dry   Tubing Weekly Wash in hot soapy water, rinse and let air dry   Mask, Tubing and Humidifier Chamber As needed Disinfect: Soak in 1 part vinegar to 3 parts hot water for 30 minutes, rinse well and air dry  Not the material headgear        MASK AND SUPPLY REORDERING  Reminder: Most insurance companies will allow for a new mask, headgear, tubing, and reusable gray filter every six months.  Disposable white ultra-fine filters are covered monthly.    EQUIPMENT NEEDS  Please call our CPAP specialist with any equipment problems, questions or needs.    HOME AND SAFETY INSTRUCTIONS    Do not use frayed or cracked electrical cords, multi plug adaptors, or switched receptacles    Do not immerse electrical equipment into water    Assure that electrical cords do not become a tripping hazard      Your BMI is Body mass index is 50.2 kg/(m^2).  Weight management is a personal  decision.  If you are interested in exploring weight loss strategies, the following discussion covers the approaches that may be successful. Body mass index (BMI) is one way to tell whether you are at a healthy weight, overweight, or obese. It measures your weight in relation to your height.  A BMI of 18.5 to 24.9 is in the healthy range. A person with a BMI of 25 to 29.9 is considered overweight, and someone with a BMI of 30 or greater is considered obese. More than two-thirds of American adults are considered overweight or obese.  Being overweight or obese increases the risk for further weight gain. Excess weight may lead to heart disease and diabetes.  Creating and following plans for healthy eating and physical activity may help you improve your health.  Weight control is part of healthy lifestyle and includes exercise, emotional health, and healthy eating habits. Careful eating habits lifelong are the mainstay of weight control. Though there are significant health benefits from weight loss, long-term weight loss with diet alone may be very difficult to achieve- studies show long-term success with dietary management in less than 10% of people. Attaining a healthy weight may be especially difficult to achieve in those with severe obesity. In some cases, medications, devices and surgical management might be considered.  What can you do?  If you are overweight or obese and are interested in methods for weight loss, you should discuss this with your provider.     Consider reducing daily calorie intake by 500 calories.     Keep a food journal.     Avoiding skipping meals, consider cutting portions instead.    Diet combined with exercise helps maintain muscle while optimizing fat loss. Strength training is particularly important for building and maintaining muscle mass. Exercise helps reduce stress, increase energy, and improves fitness. Increasing exercise without diet control, however, may not burn enough calories  to loose weight.       Start walking three days a week 10-20 minutes at a time    Work towards walking thirty minutes five days a week     Eventually, increase the speed of your walking for 1-2 minutes at time    In addition, we recommend that you review healthy lifestyles and methods for weight loss available through the National Institutes of Health patient information sites:  http://win.niddk.nih.gov/publications/index.htm    And look into health and wellness programs that may be available through your health insurance provider, employer, local community center, or [employer].    Weight management plan: Patient was referred to their PCP to discuss a diet and exercise plan.

## 2018-08-23 NOTE — NURSING NOTE
"    Chief Complaint   Patient presents with     CPAP Follow Up       Initial /71  Pulse 90  Resp 18  Ht 1.816 m (5' 11.5\")  Wt (!) 165.6 kg (365 lb)  SpO2 95%  BMI 50.2 kg/m2 Estimated body mass index is 50.2 kg/(m^2) as calculated from the following:    Height as of this encounter: 1.816 m (5' 11.5\").    Weight as of this encounter: 165.6 kg (365 lb).    Medication Reconciliation: complete    Neck circumference:  inches /  centimeters.    DME: [facility] Salazar [facility] ~[place]       "

## 2018-08-23 NOTE — PROGRESS NOTES
"Chief complaint: Follow-up sleep apnea and CPAP    History of Present Illness: 38-year-old gentleman diagnosed with severe obstructive sleep apnea by home sleep apnea testing.  He reports that he is struggling with getting used to the routine of using CPAP.  There have been a couple of issues.  He was having trouble with the nasal mask with frequent oral breathing at night.  He switch to full facemask.  He feels he had a couple of good nights after switching to the facemask.  He also had some significant family stressors which required him to go out of town.  He forgot to take his machine with him.  And then did not really use it after he came back.  He also describes a sensation of his inspiriation being cut off after he initially puts it on.  Feels that he is not quite getting a long enough breath.  And then, in the middle the night he describes the sensation is being too much pressure like below \"breathing into a leaf blower.\"  Finally,  he also struggles with keeping the device clean.  He was worried that if you did not keep it clean he was should not use it so he has not used it for that reason.    [place] Seepiness Scale:12 (Less than 10 normal)    Past Medical History:   Diagnosis Date     Reflux        No Known Allergies    Current Outpatient Prescriptions   Medication     aspirin 81 MG tablet     benzonatate (TESSALON) 100 MG capsule     guaiFENesin-codeine (ROBITUSSIN AC) 100-10 MG/5ML SOLN solution     hydrochlorothiazide (HYDRODIURIL) 25 MG tablet     lisinopril (PRINIVIL/ZESTRIL) 40 MG tablet     No current facility-administered medications for this visit.        Social History     Social History     Marital status: Single     Spouse name: N/A     Number of children: N/A     Years of education: N/A     Occupational History     Not on file.     Social History Main Topics     Smoking status: Former Smoker     Packs/day: 0.10     Years: 15.00     Types: Cigarettes     Start date: 1/1/1996     Quit date: " "1/1/2013     Smokeless tobacco: Never Used     Alcohol use 24.0 oz/week     40 Standard drinks or equivalent per week      Comment: advised cutting down 7/3/17     Drug use: No     Sexual activity: Yes     Partners: Female      Comment: none     Other Topics Concern     Not on file     Social History Narrative       Family History   Problem Relation Age of Onset     Diabetes Mother      Hypertension Mother      Cancer - colorectal Maternal Grandfather      Diabetes Paternal Grandmother      Cerebrovascular Disease Paternal Grandmother      C.A.D. Father      Snoring Father      Asthma No family hx of      Breast Cancer No family hx of      Prostate Cancer No family hx of      Thyroid Disease No family hx of      Lipids No family hx of          EXAM: Obese, no distress  /71  Pulse 90  Resp 18  Ht 1.816 m (5' 11.5\")  Wt (!) 165.6 kg (365 lb)  SpO2 95%  BMI 50.2 kg/m2  Psychiatric: Mood and affect appear flat    HSAT date:6/20/2018  Wt: 355 pounds  AHI: 116    PAP download from 7/24/2018 to 8/22/2018 reviewed:  Per cent of days used greater than 4 Hours 7 % (minimum goal greater than 70%)  Average use on days used: 2 hours 37 min  Settings: Min EPAP 8 cmH2O    Max EPAP 20 cmH2O  Pressures delivered 90/95th percentile for pressure 16.9 cmH2O  Average AHI 9.6 events per hour (goal less than 5)  Leak acceptable    During clinic visit today plugged in machine and had patient try on the device.  With [facility] , adjustments were made directly for patient comfort.  The 45 minute ramp was removed.  \"Response\" was changed from \"standard\" to \"soft\".  EPR was trialed at 0 and eventually changed to 1.  (Originally 3.)  Starting pressure increased from 8 to 11.  Patient found the setting changes comfortable and is interested in trying them at home.     ASSESSMENT: 38-year-old gentleman with very severe obstructive sleep apnea, obesity, difficulty acclimating to CPAP for multiple reasons including family stressors.  " Sense of inadequate pressure may have been contributing to difficulty falling asleep with the device on.    PLAN: Multiple changes made to the settings today to improve comfort and his ability to fall asleep with the device.  Max pressure was not changed as he requires high pressures to optimally treat sleep apnea.  In this case of will tolerate AHI less than 10 if not able to meet optimal goal of less than 5.  Have reenrolled the patient in sleep therapy management phone calls.  Patient will schedule face-to-face follow-up with me in 2 months.  We did review cleaning recommendations.  Patient is going to consider an automatic cleaning device.  Please see patient instructions for further details of counseling provided.    Forty minutes spent with patient, >50% spent counseling and coordinating care.      Benita Nicholson M.D.  Pulmonary/Critical Care/Sleep Medicine    The above note was dictated using voice recognition software and may include typographical errors. Please contact the author for any clarifications.

## 2018-08-23 NOTE — MR AVS SNAPSHOT
After Visit Summary   8/23/2018    Armando Domingo    MRN: [id number]           Patient Information     Date Of Birth          1980        Visit Information        Provider Department      8/23/2018 11:30 AM Benita Nicholson MD [facility]        Today's Diagnoses     AMADEO (obstructive sleep apnea) Severe    -  1      Care Instructions    1.  Continue CPAP every night for all hours that you are sleeping.  If you nap use CPAP.  As always, try to get at least 8 hours of sleep or more each day, and avoid sleep deprivation. Avoid alcohol.    2.  Reasons that you might need a change to your pressure therapy would be weight gain or loss, waking having inadvertently removed your CPAP overnight, having previously felt refreshed by sleep with CPAP use and now waking un-refreshed, and return of daytime sleepiness. Also, the development of new medical problems can sometimes affect breathing at night-heart failure, stroke, medications such as narcotics.    3.  Please bring CPAP with you if you are hospitalized.  If anticipating surgery be sure to discuss with your surgeon that you have sleep apnea and use PAP therapy.      4.  Maintain your equipment as recommended which includes routine cleaning and replacement of supplies.  Call DME for any questions regarding supplies or maintenance.      5.  Do not drive on engage in potentially dangerous activities if feeling sleepy.    6.  Please see me again in 2 months and bring your machine and card to your follow-up visit.      [facility] DME and cpap specialist [phone]  [facility] [phone]    [facility] D[facility] [phone], CPAP specialist [phone]  [facility] [phone]    [facility], [facility] [phone]    [facility] D[facility]  Radha Jose L [phone]  [facility] DME [place] [phone]  [facility] D[facility] phone [phone]         Tips for your CPAP and BIPAP use-    Mask fitting tips  Mask fitting exercise:    To improve your mask seal and your mobility at night, put mask on and secure in place.  Lie down in bed with full pressure and roll to one side, adjust headgear while in that position to eliminate any leaks. Repeat process rolling to other side.     The mask seal does not have to be perfect:   CPAP machines are designed to make up for small leaks. However, you will not tolerate leaks blowing in your eyes so you will need to adjust.   Any leak should only be near or at the bottom of the mask.  We expect your mask to leak slightly at night.    Do not over-tighten the headgear straps, tighter IS NOT better, we expect minimal leak.    First try re-positioning the mask or headgear before tightening the headgear straps.  Mask leaks are expected due to changing sleeping positions. Try pulling the mask away from your skin allowing the cushion to re-inflate will minimize the leak.  If you struggle for a good fit, try turning the CPAP off and then readjust the mask by pulling it away from your face and then turning back on the CPAP.        Humidifier tips  Humidifiers can be adjusted to increase or decrease the amount of moisture according to your comfort level. You may need to adjust this frequently at first, but then might only change it with seasonal weather changes.     Try INCREASING the humidity if:  You experience a dry, irritated nasal passage or throat.  You have a runny, drippy nose or sneezing fits after using CPAP.  You experience nasal congestion during or after CPAP use.    Try DECREASING the humidity if:  You have excessive condensation or  rain out  in the tubing or mask.  Otherwise keep the tubing warm during the night by running it underneath the blankets or pillow.      Clinic visit after initial CPAP and BIPAP set-up    Bring your equipment with you to your 4 week follow up clinic visit.  We will be extracting your data from the machine.        Travel  Always take your equipment with you.  If you fly with your equipment bring it on with you as a carry on.  Medical equipment does not count as a carry on.    If you travel international the machines take 110-240.  The only adapter needed is the adapter that will fit into the receptacle (outlet).    You may also want to bring an extension cord as many hot rooms have limited outlets at the bedside.  Do not travel with water in your humidifier chamber.     Cleaning and Maintenance Guidelines    Equipment Frequency Cleaning Method   Mask First Day    Daily      Weekly Soak mask in hot soapy water for 30 minutes, rinse and air dry.  Wipe nasal cushion with a hot soapy (Ivory, baby shampoo) cloth and rinse.  Baby wipes may also be used.  Do not use anti-bacterial soaps,Leanna  liquid soap, rubbing alcohol, bleach or ammonia.  Wash frame in hot soapy water (Ivory, baby shampoo) rinse and let air dry   Headgear Biweekly Wash in hot soapy water, rinse and air dry   Reusable Gray Filter Weekly Wash in hot soapy water, rinse, put in towel squeeze moisture out, let air dry   Disposable White Filter Check Weekly Replace when brown or gray in color; at least every 2 to 3 months   Humidifier Chamber Daily    Weekly Empty distilled water from humidifier and let air dry    Hand wash in hot soapy water, rinse and air dry   Tubing Weekly Wash in hot soapy water, rinse and let air dry   Mask, Tubing and Humidifier Chamber As needed Disinfect: Soak in 1 part vinegar to 3 parts hot water for 30 minutes, rinse well and air dry  Not the material headgear        MASK AND SUPPLY REORDERING  Reminder: Most insurance companies will allow for a new mask, headgear, tubing, and reusable gray filter every six months.  Disposable white ultra-fine filters are covered monthly.    EQUIPMENT NEEDS  Please call our CPAP  specialist with any equipment problems, questions or needs.    HOME AND SAFETY INSTRUCTIONS    Do not use frayed or cracked electrical cords, multi plug adaptors, or switched receptacles    Do not immerse electrical equipment into water    Assure that electrical cords do not become a tripping hazard      Your BMI is Body mass index is 50.2 kg/(m^2).  Weight management is a personal decision.  If you are interested in exploring weight loss strategies, the following discussion covers the approaches that may be successful. Body mass index (BMI) is one way to tell whether you are at a healthy weight, overweight, or obese. It measures your weight in relation to your height.  A BMI of 18.5 to 24.9 is in the healthy range. A person with a BMI of 25 to 29.9 is considered overweight, and someone with a BMI of 30 or greater is considered obese. More than two-thirds of American adults are considered overweight or obese.  Being overweight or obese increases the risk for further weight gain. Excess weight may lead to heart disease and diabetes.  Creating and following plans for healthy eating and physical activity may help you improve your health.  Weight control is part of healthy lifestyle and includes exercise, emotional health, and healthy eating habits. Careful eating habits lifelong are the mainstay of weight control. Though there are significant health benefits from weight loss, long-term weight loss with diet alone may be very difficult to achieve- studies show long-term success with dietary management in less than 10% of people. Attaining a healthy weight may be especially difficult to achieve in those with severe obesity. In some cases, medications, devices and surgical management might be considered.  What can you do?  If you are overweight or obese and are interested in methods for weight loss, you should discuss this with your provider.     Consider reducing daily calorie intake by 500 calories.     Keep a food  journal.     Avoiding skipping meals, consider cutting portions instead.    Diet combined with exercise helps maintain muscle while optimizing fat loss. Strength training is particularly important for building and maintaining muscle mass. Exercise helps reduce stress, increase energy, and improves fitness. Increasing exercise without diet control, however, may not burn enough calories to loose weight.       Start walking three days a week 10-20 minutes at a time    Work towards walking thirty minutes five days a week     Eventually, increase the speed of your walking for 1-2 minutes at time    In addition, we recommend that you review healthy lifestyles and methods for weight loss available through the National Institutes of Health patient information sites:  http://win.niddk.nih.gov/publications/index.htm    And look into health and wellness programs that may be available through your health insurance provider, employer, local community center, or gabriel club.    Weight management plan: Patient was referred to their PCP to discuss a diet and exercise plan.              Follow-ups after your visit        Follow-up notes from your care team     Return in about 2 months (around 10/23/2018).      Your next 10 appointments already scheduled     Oct 31, 2018  9:20 AM CDT   Return Sleep Patient with Benita Nicholson MD   [facility] ([facility])    [address] [place] [id number] [phone]              Who to contact     If you have questions or need follow up information about today's clinic visit or your schedule please contact [facility] directly at [phone].  Normal or non-critical lab and imaging results will be communicated to you by MyChart, letter or phone within 4 business days after the clinic has received the results. If you do not hear from us within 7 days, please contact the clinic through  "MyChart or phone. If you have a critical or abnormal lab result, we will notify you by phone as soon as possible.  Submit refill requests through [employer] or call your pharmacy and they will forward the refill request to us. Please allow 3 business days for your refill to be completed.          Additional Information About Your Visit        [employer] Information     [employer] gives you secure access to your electronic health record. If you see a primary care provider, you can also send messages to your care team and make appointments. If you have questions, please call your primary care clinic.  If you do not have a primary care provider, please call [phone] and they will assist you.        Care EveryWhere ID     This is your Care EveryWhere ID. This could be used by other organizations to access your [facility] medical records  [id number]        Your Vitals Were     Pulse Respirations Height Pulse Oximetry BMI (Body Mass Index)       90 18 1.816 m (5' 11.5\") 95% 50.2 kg/m2        Blood Pressure from Last 3 Encounters:   08/23/18 117/71   06/20/18 114/80   06/20/18 121/77    Weight from Last 3 Encounters:   08/23/18 (!) 165.6 kg (365 lb)   06/20/18 (!) 161 kg (355 lb)   06/20/18 (!) 160.6 kg (354 lb)              We Performed the Following     Comprehensive DME        Primary Care Provider Office Phone # Fax #    Natali Tucker MD PhD [phone]       [address]  [facility] [address]        Equal Access to Services     [facility] : Hadii aad ku hadasho Soomaali, waaxda luqadaha, qaybta kaalmada adeegyada, waxay kristel diaz'giselle . So [facility] [phone].    ATENCIÓN: Si habla español, tiene a chao disposición servicios gratuitos de asistencia lingüística. Llame al [phone].    We comply with applicable federal civil rights laws and [place] laws. We do not discriminate on the basis of race, color, national origin, age, disability, sex, sexual orientation, or gender identity.       "      Thank you!     Thank you for choosing [facility]  for your care. Our goal is always to provide you with excellent care. Hearing back from our patients is one way we can continue to improve our services. Please take a few minutes to complete the written survey that you may receive in the mail after your visit with us. Thank you!             Your Updated Medication List - Protect others around you: Learn how to safely use, store and throw away your medicines at www.disposemymeds.org.          This list is accurate as of 8/23/18 12:32 PM.  Always use your most recent med list.                   Brand Name Dispense Instructions for use Diagnosis    aspirin 81 MG tablet     30 tablet    Take 1 tablet by mouth daily.    Hypertension goal BP (blood pressure) < 140/90       benzonatate 100 MG capsule    TESSALON    20 capsule    Take 1 capsule (100 mg) by mouth 3 times daily as needed for cough    Acute bronchitis with symptoms greater than 10 days       guaiFENesin-codeine 100-10 MG/5ML Soln solution    ROBITUSSIN AC    236 mL    Take 10 mLs by mouth every 4 hours as needed    Lower respiratory tract infection       hydrochlorothiazide 25 MG tablet    HYDRODIURIL    90 tablet    Take 1 tablet (25 mg) by mouth daily    Hypertension goal BP (blood pressure) < 140/90       lisinopril 40 MG tablet    PRINIVIL/ZESTRIL    90 tablet    Take 1 tablet (40 mg) by mouth daily    Hypertension goal BP (blood pressure) < 140/90

## 2018-09-06 ENCOUNTER — DOCUMENTATION ONLY (OUTPATIENT)
Dept: SLEEP MEDICINE | Facility: CLINIC | Age: 38
End: 2018-09-06

## 2018-09-06 NOTE — PROGRESS NOTES
STM recheck    Diagnostic AHI:   116.1 HST    Message left for patient to return call     Assessment: Pt not meeting objective benchmarks for compliance     Action plan: waiting for patient to return call.     Device type: Auto-CPAP  PAP settings: CPAP min 11.0 cm  H20     CPAP max 20.0 cm  H20    95th% pressure 15.7 cm   Mask type:  Full Face Mask  Objective measures: 14 day rolling measures      Compliance  0 %      Leak  0.86 lpm  last  upload      AHI 9.82   last  upload      Average number of minutes 41     Average hours of usage 0.7          Objective measure goal  Compliance   Goal >70%  Leak   Goal < 24 lpm  AHI  Goal < 5  Usage  Goal >240

## 2018-12-28 ENCOUNTER — DOCUMENTATION ONLY (OUTPATIENT)
Dept: SLEEP MEDICINE | Facility: CLINIC | Age: 38
End: 2018-12-28

## 2018-12-28 DIAGNOSIS — I10 HYPERTENSION GOAL BP (BLOOD PRESSURE) < 140/90: ICD-10-CM

## 2018-12-28 NOTE — LETTER
January 2, 2019      Armando Domingo  [address]   [place] [address]              Dear Armando,    We recently received a refill request from your pharmacy requesting a refill of :     Signed Prescriptions:                        Disp   Refills    hydrochlorothiazide (HYDRODIURIL) 25 MG ta*30 tab*0        Sig: TAKE 1 TABLET BY MOUTH ONCE DAILY  Authorizing Provider: DANIELLE FRANCOIS  Ordering User: KATHIE BENAVIDES    lisinopril (PRINIVIL/ZESTRIL) 40 MG tablet 30 tab*0        Sig: TAKE 1 TABLET BY MOUTH ONCE DAILY  Authorizing Provider: DANIELLE FRANCOIS  Ordering User: KATHIE BENAVIDES      A review of your chart indicates that your last office visit was on 6/14/2018. You are due for a visit with  for your blood pressure and fasting lab work. We have authorized one time 30 day refill of your medication to allow time for you to schedule.     Please call the clinic at [phone], or log in to your [employer] account at [email or web address] to schedule your appointment within that time frame so there is no delay in next month's refill.    Thank you for taking an active role in your healthcare.    Sincerely,    Kathie Benavides RN

## 2018-12-28 NOTE — PROGRESS NOTES
6 month [facility] Recheck Visit     Diagnostic AHI:    HST AHI (Non-PAT): 116.1        Message left for patient to return call     Device type:  Auto-CPAP       Assessment: Pt not meeting objective benchmarks for compliance       Action plan: waiting for patient to return call.   pt to follow up per provider request           Objective measure goal  Compliance   Goal >70%  Leak   Goal < 24 lpm/ 10% of night in large leak  AHI  Goal < 5  Usage  Goal >240

## 2019-01-02 RX ORDER — LISINOPRIL 40 MG/1
TABLET ORAL
Qty: 30 TABLET | Refills: 0 | Status: SHIPPED | OUTPATIENT
Start: 2019-01-02 | End: 2019-02-01

## 2019-01-02 RX ORDER — HYDROCHLOROTHIAZIDE 25 MG/1
TABLET ORAL
Qty: 30 TABLET | Refills: 0 | Status: SHIPPED | OUTPATIENT
Start: 2019-01-02 | End: 2019-02-01

## 2019-01-02 NOTE — TELEPHONE ENCOUNTER
Medication is being filled for 1 time refill only due to:  Patient needs to be seen because due for visit and fasting labs.     Letter sent.     Kathie Edouard RN

## 2019-02-01 ENCOUNTER — OFFICE VISIT (OUTPATIENT)
Dept: PEDIATRICS | Facility: CLINIC | Age: 39
End: 2019-02-01
Payer: COMMERCIAL

## 2019-02-01 VITALS
WEIGHT: 315 LBS | SYSTOLIC BLOOD PRESSURE: 136 MMHG | HEIGHT: 72 IN | OXYGEN SATURATION: 94 % | BODY MASS INDEX: 42.66 KG/M2 | HEART RATE: 86 BPM | TEMPERATURE: 97.7 F | DIASTOLIC BLOOD PRESSURE: 88 MMHG

## 2019-02-01 DIAGNOSIS — Z23 NEED FOR TDAP VACCINATION: ICD-10-CM

## 2019-02-01 DIAGNOSIS — E66.01 MORBID OBESITY WITH BMI OF 45.0-49.9, ADULT (H): ICD-10-CM

## 2019-02-01 DIAGNOSIS — R73.01 ELEVATED FASTING BLOOD SUGAR: ICD-10-CM

## 2019-02-01 DIAGNOSIS — Z13.29 SCREENING FOR THYROID DISORDER: ICD-10-CM

## 2019-02-01 DIAGNOSIS — Z23 NEED FOR PROPHYLACTIC VACCINATION AND INOCULATION AGAINST INFLUENZA: ICD-10-CM

## 2019-02-01 DIAGNOSIS — R80.9 MICROALBUMINURIA: ICD-10-CM

## 2019-02-01 DIAGNOSIS — G47.33 OSA (OBSTRUCTIVE SLEEP APNEA): ICD-10-CM

## 2019-02-01 DIAGNOSIS — Z13.6 CARDIOVASCULAR SCREENING; LDL GOAL LESS THAN 160: ICD-10-CM

## 2019-02-01 DIAGNOSIS — I10 HYPERTENSION GOAL BP (BLOOD PRESSURE) < 140/90: Primary | ICD-10-CM

## 2019-02-01 PROCEDURE — 90686 IIV4 VACC NO PRSV 0.5 ML IM: CPT | Performed by: FAMILY MEDICINE

## 2019-02-01 PROCEDURE — 99214 OFFICE O/P EST MOD 30 MIN: CPT | Mod: 25 | Performed by: FAMILY MEDICINE

## 2019-02-01 PROCEDURE — 90471 IMMUNIZATION ADMIN: CPT | Performed by: FAMILY MEDICINE

## 2019-02-01 PROCEDURE — 90472 IMMUNIZATION ADMIN EACH ADD: CPT | Performed by: FAMILY MEDICINE

## 2019-02-01 PROCEDURE — 90715 TDAP VACCINE 7 YRS/> IM: CPT | Performed by: FAMILY MEDICINE

## 2019-02-01 RX ORDER — HYDROCHLOROTHIAZIDE 25 MG/1
25 TABLET ORAL DAILY
Qty: 90 TABLET | Refills: 1 | Status: SHIPPED | OUTPATIENT
Start: 2019-02-01 | End: 2019-08-28

## 2019-02-01 RX ORDER — LISINOPRIL 40 MG/1
40 TABLET ORAL DAILY
Qty: 90 TABLET | Refills: 1 | Status: SHIPPED | OUTPATIENT
Start: 2019-02-01 | End: 2019-08-28

## 2019-02-01 ASSESSMENT — MIFFLIN-ST. JEOR: SCORE: 2610.02

## 2019-02-01 ASSESSMENT — PAIN SCALES - GENERAL: PAINLEVEL: NO PAIN (0)

## 2019-02-01 NOTE — PATIENT INSTRUCTIONS
Get the TDAP shot today  Get the flu shot today    Schedule for fasting labs next week  Schedule for physical in 6 months    Avoid alcohol, soda, sugars, syrups

## 2019-02-01 NOTE — PROGRESS NOTES
SUBJECTIVE:   Armando Domingo is a 38 year old male who presents to clinic today for the following health issues:    Hypertension Follow-up      Outpatient blood pressures are not being checked.    Low Salt Diet: no added salt      Amount of exercise or physical activity: 2-3 days/week for an average of 15-30 minutes    Problems taking medications regularly: No    Medication side effects: none    Diet: regular (no restrictions)    AMADEO-patient was diagnosed with obstructive sleep apnea last summer, was given CPAP which he was unable to use due to the inconvenience  OBESITY-patient is interested in discussing weight management with diet and exercise today  Denies underlying history of thyroid disorder, diabetes  Patient states she eats fairly healthy but in significantly loss of portions  Drinks 2-3 servings of alcohol every day  Has not started exercising yet    Problem list and histories reviewed & adjusted, as indicated.  Additional history: as documented    Patient Active Problem List   Diagnosis     Hypertension goal BP (blood pressure) < 140/90     Hyperlipidemia LDL goal <130     Hepatic steatosis     Alcohol dependence, continuous (H)     Cervicalgia     Morbid obesity due to excess calories (H)     AMADEO (obstructive sleep apnea) Severe     Past Surgical History:   Procedure Laterality Date     SOFT TISSUE SURGERY      cyst removal       Social History     Tobacco Use     Smoking status: Former Smoker     Packs/day: 0.10     Years: 15.00     Pack years: 1.50     Types: Cigarettes     Start date: 1996     Last attempt to quit: 2013     Years since quittin.0     Smokeless tobacco: Never Used   Substance Use Topics     Alcohol use: Yes     Alcohol/week: 24.0 oz     Types: 40 Standard drinks or equivalent per week     Comment: advised cutting down 7/3/17     Family History   Problem Relation Age of Onset     Diabetes Mother      Hypertension Mother      Cancer - colorectal Maternal Grandfather       Diabetes Paternal Grandmother      Cerebrovascular Disease Paternal Grandmother      VEROAJUSTINA Father      Snoring Father      Asthma No family hx of      Breast Cancer No family hx of      Prostate Cancer No family hx of      Thyroid Disease No family hx of      Lipids No family hx of          Current Outpatient Medications   Medication Sig Dispense Refill     aspirin 81 MG tablet Take 1 tablet by mouth daily. 30 tablet 3     hydrochlorothiazide (HYDRODIURIL) 25 MG tablet Take 1 tablet (25 mg) by mouth daily 90 tablet 1     lisinopril (PRINIVIL/ZESTRIL) 40 MG tablet Take 1 tablet (40 mg) by mouth daily 90 tablet 1     No Known Allergies  Recent Labs   Lab Test 07/03/17  0702 05/09/16  0717 11/19/14  0813   * 112* 129   HDL 41 31* 43   TRIG 116 118 89   ALT 60 59 68   CR 1.09 1.09 1.11   GFRESTIMATED 77 78 77   GFRESTBLACK >90   GFR Calc   >90   GFR Calc   >90   GFR Calc     POTASSIUM 4.1 4.0 4.7   TSH 2.70 3.30  --       BP Readings from Last 3 Encounters:   02/01/19 136/88   08/23/18 117/71   06/20/18 114/80    Wt Readings from Last 3 Encounters:   02/01/19 (!) 164.2 kg (362 lb)   08/23/18 (!) 165.6 kg (365 lb)   06/20/18 (!) 161 kg (355 lb)                  Labs reviewed in EPIC    Reviewed and updated as needed this visit by clinical staff       Reviewed and updated as needed this visit by Provider         ROS:  CONSTITUTIONAL:weight gain  INTEGUMENTARY/SKIN: NEGATIVE for worrisome rashes, moles or lesions  EYES: NEGATIVE for vision changes or irritation  RESP: NEGATIVE for significant cough or SOB  CV: NEGATIVE for chest pain, palpitations or peripheral edema  CV: Hx HTN  GI: NEGATIVE for nausea, abdominal pain, heartburn, or change in bowel habits  MUSCULOSKELETAL: NEGATIVE for significant arthralgias or myalgia  NEURO: NEGATIVE for weakness, dizziness or paresthesias  ENDOCRINE: NEGATIVE for temperature intolerance, skin/hair changes  HEME/ALLERGY/IMMUNE:  NEGATIVE for bleeding problems  PSYCHIATRIC: NEGATIVE for changes in mood or affect    OBJECTIVE:     /88 (BP Location: Right arm, Patient Position: Sitting, Cuff Size: Adult Large)   Pulse 86   Temp 97.7  F (36.5  C) (Temporal)   Ht 1.829 m (6')   Wt (!) 164.2 kg (362 lb)   SpO2 94%   BMI 49.10 kg/m    Body mass index is 49.1 kg/m .  GENERAL: healthy, alert and no distress  NECK: no adenopathy, no asymmetry, masses, or scars and thyroid normal to palpation  NECK: Short neck  RESP: lungs clear to auscultation - no rales, rhonchi or wheezes  CV: regular rate and rhythm, normal S1 S2, no S3 or S4, no murmur, click or rub, no peripheral edema and peripheral pulses strong  MS: no gross musculoskeletal defects noted, no edema  NEURO: Normal strength and tone, mentation intact and speech normal  PSYCH: mentation appears normal, affect normal/bright    Diagnostic Test Results:  none     ASSESSMENT/PLAN:     Hypertension; controlled   Associated with the following complications:    Microalbuminuria   Plan:  As mentioned below      BMI:   Estimated body mass index is 49.1 kg/m  as calculated from the following:    Height as of this encounter: 1.829 m (6').    Weight as of this encounter: 164.2 kg (362 lb).   Weight management plan: Discussed healthy diet and exercise guidelines      1. Hypertension goal BP (blood pressure) < 140/90  BP Readings from Last 6 Encounters:   02/01/19 136/88   08/23/18 117/71   06/20/18 114/80   06/20/18 121/77   06/14/18 125/84   06/12/18 129/86     Blood pressure is at goal  Continue with lisinopril and hydrochlorothiazide at current doses  Patient is overdue for fasting labs since 2017  Emphasized on getting therapeutic labs for monitoring, patient will schedule for fasting labs next week  Will follow low salt diet, weight loss and regular exercises.  Untreated sleep apnea could be contributing along with his daily alcohol use for his elevated blood pressure  1 Limit alcohol  consumption to less than 0-1 drinks per day (1 drink=5 oz.wine, 12 oz. Beer or 1.5 oz. 80-proof liquor).    - lisinopril (PRINIVIL/ZESTRIL) 40 MG tablet; Take 1 tablet (40 mg) by mouth daily  Dispense: 90 tablet; Refill: 1  - hydrochlorothiazide (HYDRODIURIL) 25 MG tablet; Take 1 tablet (25 mg) by mouth daily  Dispense: 90 tablet; Refill: 1  - **Comprehensive metabolic panel FUTURE anytime; Future  - **TSH with free T4 reflex FUTURE anytime; Future  - Albumin Random Urine Quantitative with Creat Ratio; Future    2. Need for prophylactic vaccination and inoculation against influenza    - FLU VACCINE, SPLIT VIRUS, IM (QUADRIVALENT) [24722]- >3 YRS  - Vaccine Administration, Initial [58753]    3. Need for Tdap vaccination    - TDAP VACCINE (ADACEL)    4. Elevated fasting blood sugar  Glucose   Date Value Ref Range Status   07/03/2017 105 (H) 70 - 99 mg/dL Final     Comment:     Fasting specimen   05/09/2016 102 (H) 70 - 99 mg/dL Final   11/19/2014 97 70 - 99 mg/dL Final     Comment:     Effective 7/30/2014, the reference range for this assay has changed to reflect   new instrumentation/methodology.     10/02/2013 88 60 - 99 mg/dL Final     Comment:     Fasting specimen   10/18/2012 97 60 - 99 mg/dL Final     Comment:     Fasting specimen     Emphasized on weight loss, portion control, low calorie and low fat diet, healthy eating, regular exercises. Offered dietary consult, declined. Encouraged to enroll in a weight loss program for tracking on meal planning and weight.    - **Comprehensive metabolic panel FUTURE anytime; Future  - **A1C FUTURE anytime; Future    5. Screening for thyroid disorder    - **TSH with free T4 reflex FUTURE anytime; Future    6. Microalbuminuria    - **Comprehensive metabolic panel FUTURE anytime; Future  - Albumin Random Urine Quantitative with Creat Ratio; Future    7. Morbid obesity with BMI of 45.0-49.9, adult (H)  Wt Readings from Last 5 Encounters:   02/01/19 (!) 164.2 kg (362 lb)    08/23/18 (!) 165.6 kg (365 lb)   06/20/18 (!) 161 kg (355 lb)   06/20/18 (!) 160.6 kg (354 lb)   06/14/18 (!) 160.1 kg (353 lb)     Patient had dietary consult last year which did not benefit him much  Reviewed low carbohydrate diet including avoiding simple sugars, alcohol, soda which he has been doing 2 cans/day, following portion control.  And starting on a regular exercise program including at least 30 minutes of brisk walking  Follow-up for weight recheck in 6 months at the time of physical with PCP or sooner if needed  - **Comprehensive metabolic panel FUTURE anytime; Future  - **A1C FUTURE anytime; Future  - Lipid panel reflex to direct LDL Fasting; Future  - **TSH with free T4 reflex FUTURE anytime; Future    8. CARDIOVASCULAR SCREENING; LDL GOAL LESS THAN 160    - Lipid panel reflex to direct LDL Fasting; Future    9. AMADEO (obstructive sleep apnea)  Emphasized on getting it treated, patient does not want to start back on CPAP.  Recommended to follow-up with Dr. hahn To discuss about other alternative options  Emphasized on significant weight loss      Work on weight loss  Regular exercise  Chart documentation done in part with Dragon Voice recognition Software. Although reviewed after completion, some word and grammatical error may remain.  Chart forwarded to PCP for FYI     See Patient Instructions    Yogesh Zaldivar MD  [facility]      Injectable Influenza Immunization Documentation    1.  Is the person to be vaccinated sick today?   No    2. Does the person to be vaccinated have an allergy to a component   of the vaccine?   No  Egg Allergy Algorithm Link    3. Has the person to be vaccinated ever had a serious reaction   to influenza vaccine in the past?   No    4. Has the person to be vaccinated ever had Guillain-Barré syndrome?   No    Form completed by Shital MESA CMA

## 2019-02-06 ENCOUNTER — TELEPHONE (OUTPATIENT)
Dept: PEDIATRICS | Facility: CLINIC | Age: 39
End: 2019-02-06

## 2019-02-06 NOTE — TELEPHONE ENCOUNTER
Stuffy nose, coughing, tired. He denies fever or SOB. He is taking OTC cold medications.   Informed patient that the flu vaccine is inactive and likely did not cause his infection.  He will schedule or go to UC if he worsens. Patient verbalized understanding.   Raquel Lundberg RN

## 2019-02-06 NOTE — TELEPHONE ENCOUNTER
[facility] Call Center    Phone Message    May a detailed message be left on voicemail: yes    Reason for Call: Symptoms or Concerns     If patient has red-flag symptoms, warm transfer to triage line    Current symptom or concern: cold symptoms after receiving the flu injection    Symptoms have been present for:  A couple day(s)    Has patient previously been seen for this? No    By Dr. Tucker    Are there any new or worsening symptoms? Yes      Action Taken: Message routed to:  Primary Care p [phone]

## 2019-03-13 ENCOUNTER — OFFICE VISIT (OUTPATIENT)
Dept: PEDIATRICS | Facility: CLINIC | Age: 39
End: 2019-03-13
Payer: COMMERCIAL

## 2019-03-13 ENCOUNTER — ANCILLARY PROCEDURE (OUTPATIENT)
Dept: MAMMOGRAPHY | Facility: CLINIC | Age: 39
End: 2019-03-13
Attending: INTERNAL MEDICINE
Payer: COMMERCIAL

## 2019-03-13 ENCOUNTER — ANCILLARY PROCEDURE (OUTPATIENT)
Dept: ULTRASOUND IMAGING | Facility: CLINIC | Age: 39
End: 2019-03-13
Attending: INTERNAL MEDICINE
Payer: COMMERCIAL

## 2019-03-13 VITALS
TEMPERATURE: 98 F | OXYGEN SATURATION: 97 % | SYSTOLIC BLOOD PRESSURE: 128 MMHG | WEIGHT: 315 LBS | HEART RATE: 96 BPM | BODY MASS INDEX: 49.64 KG/M2 | DIASTOLIC BLOOD PRESSURE: 88 MMHG

## 2019-03-13 DIAGNOSIS — N62 GYNECOMASTIA, MALE: ICD-10-CM

## 2019-03-13 DIAGNOSIS — N64.4 BREAST PAIN: Primary | ICD-10-CM

## 2019-03-13 DIAGNOSIS — N64.4 BREAST PAIN: ICD-10-CM

## 2019-03-13 PROCEDURE — 77066 DX MAMMO INCL CAD BI: CPT

## 2019-03-13 PROCEDURE — 99213 OFFICE O/P EST LOW 20 MIN: CPT | Performed by: INTERNAL MEDICINE

## 2019-03-13 PROCEDURE — 76641 ULTRASOUND BREAST COMPLETE: CPT | Mod: LT

## 2019-03-13 NOTE — PROGRESS NOTES
SUBJECTIVE:   Armando Domingo is a 38 year old male who presents to clinic today for the following health issues:      Musculoskeletal problem/pain      Duration: 2 days    Description  Location: Left Pectoral pain, Sharp pain    Intensity:  moderate    Accompanying signs and symptoms: none    History  Previous similar problem: no   Previous evaluation:  none    Precipitating or alleviating factors:  Trauma or overuse: no   Aggravating factors include: Movement    Therapies tried and outcome: nothing    He denies any injury.  He feels that the pain is very localized to the lateral side of the nipple.  He feels that sometimes he can feel something sharp inside.    ROS:  Constitutional, HEENT, cardiovascular, pulmonary, gi and gu systems are negative, except as otherwise noted.         Current Outpatient Medications on File Prior to Visit:  aspirin 81 MG tablet Take 1 tablet by mouth daily.   hydrochlorothiazide (HYDRODIURIL) 25 MG tablet Take 1 tablet (25 mg) by mouth daily   lisinopril (PRINIVIL/ZESTRIL) 40 MG tablet Take 1 tablet (40 mg) by mouth daily     No current facility-administered medications on file prior to visit.        Patient Active Problem List   Diagnosis     Hypertension goal BP (blood pressure) < 140/90     Hyperlipidemia LDL goal <130     Hepatic steatosis     Alcohol dependence, continuous (H)     Cervicalgia     Morbid obesity due to excess calories (H)     AMADEO (obstructive sleep apnea) Severe     Past Surgical History:   Procedure Laterality Date     SOFT TISSUE SURGERY      cyst removal       Social History     Tobacco Use     Smoking status: Former Smoker     Packs/day: 0.10     Years: 15.00     Pack years: 1.50     Types: Cigarettes     Start date: 1996     Last attempt to quit: 2013     Years since quittin.1     Smokeless tobacco: Never Used   Substance Use Topics     Alcohol use: Yes     Alcohol/week: 24.0 oz     Types: 40 Standard drinks or equivalent per week     Comment:  advised cutting down 7/3/17     Family History   Problem Relation Age of Onset     Diabetes Mother      Hypertension Mother      Cancer - colorectal Maternal Grandfather      Diabetes Paternal Grandmother      Cerebrovascular Disease Paternal Grandmother      C.A.D. Father      Snoring Father      Asthma No family hx of      Breast Cancer No family hx of      Prostate Cancer No family hx of      Thyroid Disease No family hx of      Lipids No family hx of              Problem list, Medication list, Allergies, and Medical/Social/Surgical histories reviewed in [facility] and updated as appropriate.    OBJECTIVE:                                                    /88   Pulse 96   Temp 98  F (36.7  C) (Temporal)   Wt (!) 166 kg (366 lb)   SpO2 97%   BMI 49.64 kg/m      GENERAL: Morbidly obese, alert and no distress  Breast exam: Gynecomastia present.  Palpation of the left breast showed no specific lumps, but there is focal tenderness at the 3 o'clock position about 1 inch lateral to the nipple.  No unusual skin changes            ASSESSMENT/PLAN:                                                      38 year old male with the following diagnoses and treatment plan:      ICD-10-CM    1. Breast pain N64.4 US Breast Left Complete 4 Quadrants     MA Diagnostic Digital Bilateral     CANCELED: MA Diagnostic Digital Left       Will call or return to clinic if worsening or symptoms not improving as discussed.  See Patient Instructions.      Natali Tucker MD-PhD  [facility] [place]    (Note: Chart documentation was done in part with Dragon Voice Recognition software. Although reviewed after completion, some word and grammatical errors may remain.)

## 2019-04-12 PROBLEM — M54.2 CERVICALGIA: Status: RESOLVED | Noted: 2017-07-03 | Resolved: 2019-04-12

## 2019-04-12 NOTE — PROGRESS NOTES
Discharge Note    Progress reporting period is from initial evaluation date (please see noted date below) to Jul 25, 2017.  No linked episodes      Armando failed to follow up and current status is unknown.  Please see information below for last relevant information on current status.  Patient seen for 4 visits.    SUBJECTIVE  Subjective changes noted by patient:  Patient reports neck definitly feels better overall. Patient report sitting in the same spot for a while is the most bothersome. Last week got in his work out 1 x.   .  Current pain level is 1/10.     Previous pain level was  2/10.   Changes in function:  Yes (See Goal flowsheet attached for changes in current functional level)  Adverse reaction to treatment or activity: None    OBJECTIVE  Changes noted in objective findings: ROM See below. No radicular symptoms. slight tendnerness with palpation of subocciptals. posture, rounded shoulders with slight forward head.      ASSESSMENT/PLAN  Diagnosis: cervical pain   Updated problem list and treatment plan:   Pain - HEP  Decreased function - HEP  STG/LTGs have been met or progress has been made towards goals:  Yes, please see goal flowsheet for most current information  Assessment of Progress: current status is unknown.    Last current status: Pt is progressing as expected   Self Management Plans:  HEP  I have re-evaluated this patient and find that the nature, scope, duration and intensity of the therapy is appropriate for the medical condition of the patient.  Armando continues to require the following intervention to meet STG and LTG's:  HEP.    Recommendations:  Discharge with current home program.  Patient to follow up with MD as needed.    Please refer to the daily flowsheet for treatment today, total treatment time and time spent performing 1:1 timed codes.

## 2019-05-23 ENCOUNTER — TELEPHONE (OUTPATIENT)
Dept: SLEEP MEDICINE | Facility: CLINIC | Age: 39
End: 2019-05-23

## 2019-05-23 NOTE — TELEPHONE ENCOUNTER
I called pateint today 05/23/19 at 3:03 pm to let him know he will need to return the machine back to us. He is not compliant and has not been using the machine. No answer left message for him to call me back here in [place].

## 2019-06-21 ENCOUNTER — OFFICE VISIT (OUTPATIENT)
Dept: PEDIATRICS | Facility: CLINIC | Age: 39
End: 2019-06-21
Payer: COMMERCIAL

## 2019-06-21 VITALS
BODY MASS INDEX: 42.66 KG/M2 | DIASTOLIC BLOOD PRESSURE: 77 MMHG | SYSTOLIC BLOOD PRESSURE: 109 MMHG | OXYGEN SATURATION: 97 % | HEART RATE: 97 BPM | HEIGHT: 72 IN | TEMPERATURE: 97.8 F | WEIGHT: 315 LBS

## 2019-06-21 DIAGNOSIS — T14.8XXA WOUND OF SKIN: Primary | ICD-10-CM

## 2019-06-21 PROCEDURE — 99213 OFFICE O/P EST LOW 20 MIN: CPT | Performed by: INTERNAL MEDICINE

## 2019-06-21 RX ORDER — CEPHALEXIN 500 MG/1
500 CAPSULE ORAL 4 TIMES DAILY
Qty: 28 CAPSULE | Refills: 0 | Status: SHIPPED | OUTPATIENT
Start: 2019-06-21 | End: 2019-08-28

## 2019-06-21 RX ORDER — CEPHALEXIN 500 MG/1
500 CAPSULE ORAL 2 TIMES DAILY
Qty: 14 CAPSULE | Refills: 0 | Status: SHIPPED | OUTPATIENT
Start: 2019-06-21 | End: 2019-06-21

## 2019-06-21 ASSESSMENT — MIFFLIN-ST. JEOR: SCORE: 2649.03

## 2019-06-21 NOTE — PROGRESS NOTES
Sore between  (1st and 2nd digits) on his left foot    Armando Domingo is a 38 year old male who presents to clinic today for the following health issues:    HPI   Sore between toes    Sore between (1st and 2nd digits) Left foot for the last 5 days    Very itchy, looks like a cut but patient states there was no apparent injury.    The sore area feels better today.  Yesterday he felt there was some swelling there.  No fever or chills.  No red streaks.  No known injury.  He does not wear sandals or tongs.          Patient Active Problem List   Diagnosis     Hypertension goal BP (blood pressure) < 140/90     Hyperlipidemia LDL goal <130     Hepatic steatosis     Alcohol dependence, continuous (H)     Morbid obesity due to excess calories (H)     AMADEO (obstructive sleep apnea) Severe     Past Surgical History:   Procedure Laterality Date     SOFT TISSUE SURGERY      cyst removal       Social History     Tobacco Use     Smoking status: Former Smoker     Packs/day: 0.10     Years: 15.00     Pack years: 1.50     Types: Cigarettes     Start date: 1996     Last attempt to quit: 2013     Years since quittin.4     Smokeless tobacco: Never Used   Substance Use Topics     Alcohol use: Yes     Alcohol/week: 24.0 oz     Types: 40 Standard drinks or equivalent per week     Comment: advised cutting down 7/3/17     Family History   Problem Relation Age of Onset     Diabetes Mother      Hypertension Mother      Cancer - colorectal Maternal Grandfather      Diabetes Paternal Grandmother      Cerebrovascular Disease Paternal Grandmother      C.A.D. Father      Snoring Father      Asthma No family hx of      Breast Cancer No family hx of      Prostate Cancer No family hx of      Thyroid Disease No family hx of      Lipids No family hx of          Current Outpatient Medications   Medication Sig Dispense Refill     aspirin 81 MG tablet Take 1 tablet by mouth daily. 30 tablet 3     cephALEXin (KEFLEX) 500 MG capsule Take 1 capsule  (500 mg) by mouth 2 times daily for 7 days 14 capsule 0     hydrochlorothiazide (HYDRODIURIL) 25 MG tablet Take 1 tablet (25 mg) by mouth daily 90 tablet 1     lisinopril (PRINIVIL/ZESTRIL) 40 MG tablet Take 1 tablet (40 mg) by mouth daily 90 tablet 1     No Known Allergies      Reviewed and updated as needed this visit by Provider         Review of Systems   ROS COMP: Constitutional, HEENT, cardiovascular, pulmonary, gi and gu systems are negative, except as otherwise noted.      Objective    There were no vitals taken for this visit.  There is no height or weight on file to calculate BMI.  Physical Exam   GENERAL: healthy, alert and no distress  MS: no gross musculoskeletal defects noted, no edema  SKIN: A 5 mm vertical skin tear in the web of the great toe and left second toe.  The wound looks clean and superficial.  There is no expressible drainage.  There is no surrounding redness of the skin.    Diagnostic Test Results:  Labs reviewed in Epic        Assessment & Plan     1.  Skin wound involving left foot.  Etiology of the skin tear is unknown.  I decided to treat him with Keflex 5 mg 4 times daily for 7 days.  I suspect that the skin tear will heal on its own.  If not better return.     BMI:   Estimated body mass index is 50.26 kg/m  as calculated from the following:    Height as of this encounter: 1.829 m (6').    Weight as of this encounter: 168.1 kg (370 lb 9.6 oz).               No follow-ups on file.    Thiago Bhardwaj MD  [facility]

## 2019-08-20 DIAGNOSIS — Z13.29 SCREENING FOR THYROID DISORDER: ICD-10-CM

## 2019-08-20 DIAGNOSIS — R73.01 ELEVATED FASTING BLOOD SUGAR: ICD-10-CM

## 2019-08-20 DIAGNOSIS — I10 HYPERTENSION GOAL BP (BLOOD PRESSURE) < 140/90: ICD-10-CM

## 2019-08-20 DIAGNOSIS — Z13.6 CARDIOVASCULAR SCREENING; LDL GOAL LESS THAN 160: ICD-10-CM

## 2019-08-20 DIAGNOSIS — E66.01 MORBID OBESITY WITH BMI OF 45.0-49.9, ADULT (H): ICD-10-CM

## 2019-08-20 DIAGNOSIS — R80.9 MICROALBUMINURIA: ICD-10-CM

## 2019-08-20 LAB
ALBUMIN SERPL-MCNC: 3.6 G/DL (ref 3.4–5)
ALP SERPL-CCNC: 58 U/L (ref 40–150)
ALT SERPL W P-5'-P-CCNC: 54 U/L (ref 0–70)
ANION GAP SERPL CALCULATED.3IONS-SCNC: 5 MMOL/L (ref 3–14)
AST SERPL W P-5'-P-CCNC: 29 U/L (ref 0–45)
BILIRUB SERPL-MCNC: 0.7 MG/DL (ref 0.2–1.3)
BUN SERPL-MCNC: 17 MG/DL (ref 7–30)
CALCIUM SERPL-MCNC: 8.6 MG/DL (ref 8.5–10.1)
CHLORIDE SERPL-SCNC: 103 MMOL/L (ref 94–109)
CHOLEST SERPL-MCNC: 145 MG/DL
CO2 SERPL-SCNC: 30 MMOL/L (ref 20–32)
CREAT SERPL-MCNC: 1.18 MG/DL (ref 0.66–1.25)
CREAT UR-MCNC: 173 MG/DL
GFR SERPL CREATININE-BSD FRML MDRD: 78 ML/MIN/{1.73_M2}
GLUCOSE SERPL-MCNC: 118 MG/DL (ref 70–99)
HBA1C MFR BLD: 5.9 % (ref 0–5.6)
HDLC SERPL-MCNC: 35 MG/DL
LDLC SERPL CALC-MCNC: 90 MG/DL
MICROALBUMIN UR-MCNC: 56 MG/L
MICROALBUMIN/CREAT UR: 32.08 MG/G CR (ref 0–17)
NONHDLC SERPL-MCNC: 110 MG/DL
POTASSIUM SERPL-SCNC: 4.3 MMOL/L (ref 3.4–5.3)
PROT SERPL-MCNC: 7.8 G/DL (ref 6.8–8.8)
SODIUM SERPL-SCNC: 138 MMOL/L (ref 133–144)
TRIGL SERPL-MCNC: 98 MG/DL
TSH SERPL DL<=0.005 MIU/L-ACNC: 2.27 MU/L (ref 0.4–4)

## 2019-08-20 PROCEDURE — 80053 COMPREHEN METABOLIC PANEL: CPT | Performed by: FAMILY MEDICINE

## 2019-08-20 PROCEDURE — 36415 COLL VENOUS BLD VENIPUNCTURE: CPT | Performed by: FAMILY MEDICINE

## 2019-08-20 PROCEDURE — 83036 HEMOGLOBIN GLYCOSYLATED A1C: CPT | Performed by: FAMILY MEDICINE

## 2019-08-20 PROCEDURE — 84443 ASSAY THYROID STIM HORMONE: CPT | Performed by: FAMILY MEDICINE

## 2019-08-20 PROCEDURE — 80061 LIPID PANEL: CPT | Performed by: FAMILY MEDICINE

## 2019-08-20 PROCEDURE — 82043 UR ALBUMIN QUANTITATIVE: CPT | Performed by: FAMILY MEDICINE

## 2019-08-28 ENCOUNTER — OFFICE VISIT (OUTPATIENT)
Dept: PEDIATRICS | Facility: CLINIC | Age: 39
End: 2019-08-28
Payer: COMMERCIAL

## 2019-08-28 VITALS
HEART RATE: 97 BPM | OXYGEN SATURATION: 96 % | BODY MASS INDEX: 50.38 KG/M2 | TEMPERATURE: 98.7 F | WEIGHT: 315 LBS | SYSTOLIC BLOOD PRESSURE: 119 MMHG | DIASTOLIC BLOOD PRESSURE: 78 MMHG

## 2019-08-28 DIAGNOSIS — R80.9 MICROALBUMINURIA: ICD-10-CM

## 2019-08-28 DIAGNOSIS — I10 HYPERTENSION GOAL BP (BLOOD PRESSURE) < 140/90: Primary | ICD-10-CM

## 2019-08-28 DIAGNOSIS — G25.81 RESTLESS LEG: ICD-10-CM

## 2019-08-28 DIAGNOSIS — E66.01 MORBID OBESITY WITH BMI OF 45.0-49.9, ADULT (H): ICD-10-CM

## 2019-08-28 DIAGNOSIS — G47.33 OSA (OBSTRUCTIVE SLEEP APNEA): ICD-10-CM

## 2019-08-28 DIAGNOSIS — R73.03 PREDIABETES: ICD-10-CM

## 2019-08-28 LAB
BASOPHILS # BLD AUTO: 0 10E9/L (ref 0–0.2)
BASOPHILS NFR BLD AUTO: 0.3 %
DIFFERENTIAL METHOD BLD: ABNORMAL
EOSINOPHIL # BLD AUTO: 0.3 10E9/L (ref 0–0.7)
EOSINOPHIL NFR BLD AUTO: 2.7 %
ERYTHROCYTE [DISTWIDTH] IN BLOOD BY AUTOMATED COUNT: 12.2 % (ref 10–15)
FERRITIN SERPL-MCNC: 314 NG/ML (ref 26–388)
HCT VFR BLD AUTO: 48.3 % (ref 40–53)
HGB BLD-MCNC: 15.9 G/DL (ref 13.3–17.7)
IMM GRANULOCYTES # BLD: 0.1 10E9/L (ref 0–0.4)
IMM GRANULOCYTES NFR BLD: 1 %
LYMPHOCYTES # BLD AUTO: 2.3 10E9/L (ref 0.8–5.3)
LYMPHOCYTES NFR BLD AUTO: 20.1 %
MAGNESIUM SERPL-MCNC: 2 MG/DL (ref 1.6–2.3)
MCH RBC QN AUTO: 28.5 PG (ref 26.5–33)
MCHC RBC AUTO-ENTMCNC: 32.9 G/DL (ref 31.5–36.5)
MCV RBC AUTO: 87 FL (ref 78–100)
MONOCYTES # BLD AUTO: 0.8 10E9/L (ref 0–1.3)
MONOCYTES NFR BLD AUTO: 7 %
NEUTROPHILS # BLD AUTO: 7.8 10E9/L (ref 1.6–8.3)
NEUTROPHILS NFR BLD AUTO: 68.9 %
PLATELET # BLD AUTO: 274 10E9/L (ref 150–450)
RBC # BLD AUTO: 5.57 10E12/L (ref 4.4–5.9)
VIT B12 SERPL-MCNC: 658 PG/ML (ref 193–986)
WBC # BLD AUTO: 11.3 10E9/L (ref 4–11)

## 2019-08-28 PROCEDURE — 82728 ASSAY OF FERRITIN: CPT | Performed by: FAMILY MEDICINE

## 2019-08-28 PROCEDURE — 85025 COMPLETE CBC W/AUTO DIFF WBC: CPT | Performed by: FAMILY MEDICINE

## 2019-08-28 PROCEDURE — 82607 VITAMIN B-12: CPT | Performed by: FAMILY MEDICINE

## 2019-08-28 PROCEDURE — 36415 COLL VENOUS BLD VENIPUNCTURE: CPT | Performed by: FAMILY MEDICINE

## 2019-08-28 PROCEDURE — 99214 OFFICE O/P EST MOD 30 MIN: CPT | Performed by: FAMILY MEDICINE

## 2019-08-28 PROCEDURE — 83735 ASSAY OF MAGNESIUM: CPT | Performed by: FAMILY MEDICINE

## 2019-08-28 RX ORDER — HYDROCHLOROTHIAZIDE 25 MG/1
25 TABLET ORAL DAILY
Qty: 90 TABLET | Refills: 1 | Status: SHIPPED | OUTPATIENT
Start: 2019-08-28 | End: 2020-03-04

## 2019-08-28 RX ORDER — LISINOPRIL 40 MG/1
40 TABLET ORAL DAILY
Qty: 90 TABLET | Refills: 1 | Status: SHIPPED | OUTPATIENT
Start: 2019-08-28 | End: 2020-03-04

## 2019-08-28 ASSESSMENT — PAIN SCALES - GENERAL: PAINLEVEL: NO PAIN (0)

## 2019-08-28 NOTE — PROGRESS NOTES
Subjective     Armando Domingo is a 39 year old male who presents to clinic today for the following health issues:    HPI   Restless Leg - restless right leg x 6 months  Complaining of having restless leg sensation lasting for 2 to 3 hours before he goes to bed, only on the right leg with no associated concerns for leg cramping, pain, tingling, numbness or weakness of extremities, low back pain.  Patient denies previous similar symptoms in the past.    Hyperlipidemia Follow-Up    Are you having any of the following symptoms? (Select all that apply)  No complaints of shortness of breath, chest pain or pressure.  No increased sweating or nausea with activity.  No left-sided neck or arm pain.  No complaints of pain in calves when walking 1-2 blocks.    Are you regularly taking any medication or supplement to lower your cholesterol?   No    Are you having muscle aches or other side effects that you think could be caused by your cholesterol lowering medication?  No      Hypertension Follow-up    Do you check your blood pressure regularly outside of the clinic? No     Are you following a low salt diet? No    Are your blood pressures ever more than 140 on the top number (systolic) OR more   than 90 on the bottom number (diastolic), for example 140/90? No      How many servings of fruits and vegetables do you eat daily?  3-4    On average, how many sweetened beverages do you drink each day (soda, juice, sweet tea, etc)?   1-3    How many days per week do you miss taking your medication? 0        AMADEO-he was seen by sleep physician few months ago, was diagnosed with sleep apnea, was given CPAP which patient was not able to use due to mechanical difficulties and feeling of claustrophobia  Patient did not follow-up with a sleep physician to discuss about alternate options    Patient Active Problem List   Diagnosis     Hypertension goal BP (blood pressure) < 140/90     Hyperlipidemia LDL goal <130     Hepatic steatosis      Alcohol dependence, continuous (H)     Morbid obesity due to excess calories (H)     AMADEO (obstructive sleep apnea) Severe     Restless leg     Prediabetes     Past Surgical History:   Procedure Laterality Date     SOFT TISSUE SURGERY      cyst removal       Social History     Tobacco Use     Smoking status: Former Smoker     Packs/day: 0.10     Years: 15.00     Pack years: 1.50     Types: Cigarettes     Start date: 1996     Last attempt to quit: 2013     Years since quittin.6     Smokeless tobacco: Never Used   Substance Use Topics     Alcohol use: Yes     Alcohol/week: 24.0 oz     Types: 40 Standard drinks or equivalent per week     Comment: advised cutting down 7/3/17     Family History   Problem Relation Age of Onset     Diabetes Mother      Hypertension Mother      Cancer - colorectal Maternal Grandfather      Diabetes Paternal Grandmother      Cerebrovascular Disease Paternal Grandmother      C.A.D. Father      Snoring Father      Asthma No family hx of      Breast Cancer No family hx of      Prostate Cancer No family hx of      Thyroid Disease No family hx of      Lipids No family hx of          Current Outpatient Medications   Medication Sig Dispense Refill     aspirin 81 MG tablet Take 1 tablet by mouth daily. 30 tablet 3     hydrochlorothiazide (HYDRODIURIL) 25 MG tablet Take 1 tablet (25 mg) by mouth daily 90 tablet 1     lisinopril (PRINIVIL/ZESTRIL) 40 MG tablet Take 1 tablet (40 mg) by mouth daily 90 tablet 1     No Known Allergies  Recent Labs   Lab Test 19  0707 17  0702 16  0717   A1C 5.9*  --   --    LDL 90 121* 112*   HDL 35* 41 31*   TRIG 98 116 118   ALT 54 60 59   CR 1.18 1.09 1.09   GFRESTIMATED 78 77 78   GFRESTBLACK >90 >90   GFR Calc   >90   GFR Calc     POTASSIUM 4.3 4.1 4.0   TSH 2.27 2.70 3.30      BP Readings from Last 3 Encounters:   19 119/78   19 109/77   19 128/88    Wt Readings from Last 3 Encounters:    08/28/19 (!) 168.5 kg (371 lb 8 oz)   06/21/19 (!) 168.1 kg (370 lb 9.6 oz)   03/13/19 (!) 166 kg (366 lb)                      Reviewed and updated as needed this visit by Provider         Review of Systems   ROS COMP: CONSTITUTIONAL: NEGATIVE for fever, chills, change in weight  INTEGUMENTARY/SKIN: NEGATIVE for worrisome rashes, moles or lesions  EYES: NEGATIVE for vision changes or irritation  RESP: NEGATIVE for significant cough or SOB  CV: NEGATIVE for chest pain, palpitations or peripheral edema  CV: Hx HTN  GI: NEGATIVE for nausea, abdominal pain, heartburn, or change in bowel habits  MUSCULOSKELETAL: as above  NEURO: NEGATIVE for weakness, dizziness or paresthesias  ENDOCRINE: NEGATIVE for temperature intolerance, skin/hair changes  HEME/ALLERGY/IMMUNE: NEGATIVE for bleeding problems  PSYCHIATRIC: NEGATIVE for changes in mood or affect      Objective    There were no vitals taken for this visit.  There is no height or weight on file to calculate BMI.  Physical Exam   GENERAL: healthy, alert and no distress  NECK: no adenopathy, no asymmetry, masses, or scars and thyroid normal to palpation  RESP: lungs clear to auscultation - no rales, rhonchi or wheezes  CV: regular rate and rhythm, normal S1 S2, no S3 or S4, no murmur, click or rub, no peripheral edema and peripheral pulses strong  MS: no gross musculoskeletal defects noted, no edema  MS:   SKIN: no suspicious lesions or rashes  NEURO: Normal strength and tone, mentation intact and speech normal  BACK: no CVA tenderness, no paralumbar tenderness  Comprehensive back pain exam:  No tenderness, Range of motion not limited by pain, Lower extremity strength functional and equal on both sides, Lower extremity reflexes within normal limits bilaterally, Lower extremity sensation normal and equal on both sides and Straight leg raise negative bilaterally  PSYCH: mentation appears normal, affect normal/bright  Foot- normal DP and PT pulses, no trophic changes or  ulcerative lesions and normal sensory exam    Diagnostic Test Results:  Labs reviewed in Epic        Assessment & Plan     1. Hypertension goal BP (blood pressure) < 140/90  BP Readings from Last 6 Encounters:   08/28/19 119/78   06/21/19 109/77   03/13/19 128/88   02/01/19 136/88   08/23/18 117/71   06/20/18 114/80     Blood pressure is at goal  Continue with current medications, Will follow low salt diet, weight loss and regular exercises.  Reviewed recent lab results showing normal BMP, normal potassium and microalbuminuria  Education handouts given on low-salt diet  Recheck in 6 months at the time of physical or sooner if needed  - hydrochlorothiazide (HYDRODIURIL) 25 MG tablet; Take 1 tablet (25 mg) by mouth daily  Dispense: 90 tablet; Refill: 1  - lisinopril (PRINIVIL/ZESTRIL) 40 MG tablet; Take 1 tablet (40 mg) by mouth daily  Dispense: 90 tablet; Refill: 1    2. Microalbuminuria  as above  Lab Results   Component Value Date    MICROL 56 08/20/2019     No results found for: MICROALBUMIN    - lisinopril (PRINIVIL/ZESTRIL) 40 MG tablet; Take 1 tablet (40 mg) by mouth daily  Dispense: 90 tablet; Refill: 1    3. Restless leg  ddx-screen for anemia/B12 deficiency/low magnesium/low ferritin  Reviewed normal CMP except for slightly elevated fasting blood sugar and thyroid labs from a week ago  Will f/u on results and call with recommendations.  If all test results are normal, consider starting on Requip  Patient verbalised understanding and is agreeable to the plan.    - Vitamin B12  - CBC with platelets and differential  - Magnesium  - Ferritin    4. Prediabetes  Lab Results   Component Value Date    A1C 5.9 08/20/2019     Glucose   Date Value Ref Range Status   08/20/2019 118 (H) 70 - 99 mg/dL Final     Comment:     Fasting specimen   07/03/2017 105 (H) 70 - 99 mg/dL Final     Comment:     Fasting specimen   05/09/2016 102 (H) 70 - 99 mg/dL Final   11/19/2014 97 70 - 99 mg/dL Final     Comment:     Effective  7/30/2014, the reference range for this assay has changed to reflect   new instrumentation/methodology.     10/02/2013 88 60 - 99 mg/dL Final     Comment:     Fasting specimen     Reviewed slightly elevated A1c and fasting blood sugar consistent with prediabetes  Emphasized on healthy eating, portion control, regular exercises and weight loss  Recommended dietitian consult, patient wants to do dietary restrictions on his own  We will recheck in 6 months  Will consider dietitian consult at the time,  for persistent or worsening concerns  Patient verbalised understanding and is agreeable to the plan.      5. AMADEO (obstructive sleep apnea)  Recommended patient to call to schedule for follow-up with a sleep physician to discuss about other alternate options for managing sleep apnea including use of a mouthpiece if appropriate  Emphasized on weight loss, portion control, low calorie and low fat diet, healthy eating, regular exercises.      6. Morbid obesity with BMI of 45.0-49.9, adult (H)  Wt Readings from Last 5 Encounters:   08/28/19 (!) 168.5 kg (371 lb 8 oz)   06/21/19 (!) 168.1 kg (370 lb 9.6 oz)   03/13/19 (!) 166 kg (366 lb)   02/01/19 (!) 164.2 kg (362 lb)   08/23/18 (!) 165.6 kg (365 lb)     As above in problem #4       BMI:   Estimated body mass index is 50.38 kg/m  as calculated from the following:    Height as of 6/21/19: 1.829 m (6').    Weight as of this encounter: 168.5 kg (371 lb 8 oz).   Weight management plan: Discussed healthy diet and exercise guidelines        Work on weight loss  Regular exercise  Chart documentation done in part with Dragon Voice recognition Software. Although reviewed after completion, some word and grammatical error may remain.    See Patient Instructions  Chart forwarded to PCP for FYI         Return in about 6 months (around 2/28/2020) for Annual Exam, Non Fasting Labs.    Yogesh Zaldivar MD  [facility]

## 2019-08-28 NOTE — PATIENT INSTRUCTIONS
Call to schedule for follow up with sleep physician  Schedule for physical, non fasting labs in 6 months  Get the labs today      Patient Education     Low-Salt Diet  This diet removes foods that are high in salt. It also limits the amount of salt you use when cooking. It is most often used for people with high blood pressure, edema (fluid retention), and kidney, liver, or heart disease.  Table salt contains the mineral sodium. Your body needs sodium to work normally. But too much sodium can make your health problems worse. Your healthcare provider is recommending a low-salt (also called low-sodium) diet for you. Your total daily allowance of salt is 1,500 to 2,300 milligrams (mg). It is less than 1 teaspoon of table salt. This means you can have only about 500 to 700 mg of sodium at each meal. People with certain health problems should limit salt intake to the lower end of the recommended range.    When you cook, don t add much salt. If you can cook without using salt, even better. Don t add salt to your food at the table.  When shopping, read food labels. Salt is often called sodium on the label. Choose foods that are salt-free, low salt, or very low salt. Note that foods with reduced salt may not lower your salt intake enough.    Beans, potatoes, and pasta  Ok: Dry beans, split peas, lentils, potatoes, rice, macaroni, pasta, spaghetti without added salt  Avoid: Potato chips, tortilla chips, and similar products  Breads and cereals  Ok: Low-sodium breads, rolls, cereals, and cakes; low-salt crackers, matzo crackers  Avoid: Salted crackers, pretzels, popcorn, Filipino toast, pancakes, muffins  Dairy  Ok: Milk, chocolate milk, hot chocolate mix, low-salt cheeses, and yogurt  Avoid: Processed cheese and cheese spreads; Roquefort, Camembert, and cottage cheese; buttermilk, instant breakfast drink  Desserts  Ok: Ice cream, frozen yogurt, juice bars, gelatin, cookies and pies, sugar, honey, jelly, hard candy  Avoid: Most  pies, cakes and cookies prepared or processed with salt; instant pudding  Drinks  Ok: Tea, coffee, fizzy (carbonated) drinks, juices  Avoid: Flavored coffees, electrolyte replacement drinks, sports drinks  Meats  Ok: All fresh meat, fish, poultry, low-salt tuna, eggs, egg substitute  Avoid: Smoked, pickled, brine-cured, or salted meats and fish. This includes henry, chipped beef, corned beef, hot dogs, deli meats, ham, kosher meats, salt pork, sausage, canned tuna, salted codfish, smoked salmon, herring, sardines, or anchovies.  Seasonings and spices  Ok: Most seasonings are okay. Good substitutes for salt include: fresh herb blends, hot sauce, lemon, garlic, ivory, vinegar, dry mustard, parsley, cilantro, horseradish, tomato paste, regular margarine, mayonnaise, unsalted butter, cream cheese, vegetable oil, cream, low-salt salad dressing and gravy.  Avoid: Regular ketchup, relishes, pickles, soy sauce, teriyaki sauce, Worcestershire sauce, BBQ sauce, tartar sauce, meat tenderizer, chili sauce, regular gravy, regular salad dressing, salted butter  Soups  Ok: Low-salt soups and broths made with allowed foods  Avoid: Bouillon cubes, soups with smoked or salted meats, regular soup and broth  Vegetables  Ok: Most vegetables are okay; also low-salt tomato and vegetable juices  Avoid: Sauerkraut and other brine-soaked vegetables; pickles and other pickled vegetables; tomato juice, olives  Date Last Reviewed: 8/1/2016 2000-2018 [employer]. [address] [place] [address], [place] [address]. All rights reserved. This information is not intended as a substitute for professional medical care. Always follow your healthcare professional's instructions.           Patient Education     Kidney Disease: Avoiding High-Sodium Foods  Sodium is a mineral that the body needs in small amounts. Sodium is found in table salt. Table salt is sodium chloride. Most people eat far more salt than they need. There are 2 main reasons for  this. Salt is present in high amounts in most processed foods (pre-prepared foods like breakfast cereals, cookies, and pickles) and in all restaurant foods. In other words, if you are not cooking from fresh ingredients at home, you are very likely eating more salt than you need. When sodium intake is too high, it can increase thirst and cause the body to retain fluid. This can increase blood pressure and strain the kidneys. If you have chronic kidney disease, try not to eat the foods listed here, unless the label states that they are made without salt. People with chronic kidney disease should restrict their sodium intake to less than 1,500 mg of sodium (3,800 mg of table salt) each day.      Canned and processed foods, such as gravies, instant cereal, packaged noodles and potato mixes, olives, pickles, soups, vegetables    Cheeses, such as American, Blue, Parmesan, Roquefort    Cured meats, such as henry, beef jerky, bologna, corned beef, ham, hot dogs, sandwich meats, sausages    Fast foods, such as burritos, fish sandwiches, milk shakes, salted French fries, tacos    Frozen foods, such as meat pies, TV dinners, waffles    Salted snacks, such as chips, crackers, peanut popcorn, pretzels, and nuts    Other packaged items, such as antacids, baking soda, bouillon, catsup, lite salt, relish, salted butter and margarine, soy and teriyaki sauce, steak sauce, vegetable juices  Date Last Reviewed: 2/1/2017 2000-2018 [employer]. [address] [place] [address]. All rights reserved. This information is not intended as a substitute for professional medical care. Always follow your healthcare professional's instructions.           Patient Education     Tips for Using Less Salt    Most people with heart problems need to eat less salt (sodium). Reducing the amount of salt you eat may help control your blood pressure. The higher your blood pressure, the greater your risk for heart disease, stroke,  blindness, and kidney problems.  At the store    Make low-salt choices by reading labels carefully. Look for the total amount of sodium per serving.    Use more fresh food. Buy more fruits and vegetables. Select lean meats, fish, and poultry.    Use fewer frozen, canned, and packaged foods which often contain a lot of sodium.    Use plain frozen vegetables without sauces or toppings. These products are often low-sodium or no-sodium.    Choose reduced-sodium or no-salt-added versions of canned vegetables and soups.  In the kitchen    Don't add salt to food when you're cooking. Season with flavorings such as onion, garlic, pepper, salt-free herbal blends, and lemon or lime juice.    Use a cookbook containing low-salt recipes. It can give you ideas for tasty meals that are healthy for your heart.    Sprinkle salt-free herbal blends on vegetables and meat.    Drain and rinse canned foods, such as canned beans and vegetables, before cooking or eating.  Eating out    Tell the  you're on a low-salt diet. Ask questions about the menu.    Order fish, chicken, and meat broiled, baked, poached, or grilled without salt, butter, or breading.    Use lemon, pepper, and salt-free herb mixes to add flavor.    Choose plain steamed rice, boiled noodles, and baked or boiled potatoes. Top potatoes with chives and a little sour cream.     Beware! Salt goes by many other names. Limit foods with these words listed as ingredients: salt, sodium, soy sauce, baking soda, baking powder, MSG, monosodium, Na (the chemical symbol for sodium). Some antacids are also high in salt.   Date Last Reviewed: 6/1/2017 2000-2018 [employer]. [address] [place] [address], [place] [address]. All rights reserved. This information is not intended as a substitute for professional medical care. Always follow your healthcare professional's instructions.

## 2019-08-30 PROBLEM — R80.9 MICROALBUMINURIA: Status: ACTIVE | Noted: 2019-08-30

## 2019-08-30 PROBLEM — E66.01 MORBID OBESITY WITH BMI OF 45.0-49.9, ADULT (H): Status: ACTIVE | Noted: 2019-08-30

## 2019-08-30 RX ORDER — ROPINIROLE 0.5 MG/1
0.5 TABLET, FILM COATED ORAL AT BEDTIME
Qty: 90 TABLET | Refills: 1 | Status: SHIPPED | OUTPATIENT
Start: 2019-08-30 | End: 2020-03-04

## 2019-08-30 NOTE — RESULT ENCOUNTER NOTE
Kal Roberts,  Your lab results showed normal test results for vitamin B12, iron stores and magnesium.  As we discussed, I have sent a prescription for Requip to take once 60 minutes before bedtime.   Let me know if you have any questions. Take care.  Yogesh Zaldivar MD

## 2019-11-05 ENCOUNTER — OFFICE VISIT (OUTPATIENT)
Dept: PEDIATRICS | Facility: CLINIC | Age: 39
End: 2019-11-05
Payer: COMMERCIAL

## 2019-11-05 VITALS
HEART RATE: 87 BPM | SYSTOLIC BLOOD PRESSURE: 102 MMHG | BODY MASS INDEX: 49.5 KG/M2 | OXYGEN SATURATION: 97 % | WEIGHT: 315 LBS | DIASTOLIC BLOOD PRESSURE: 62 MMHG | TEMPERATURE: 98.4 F

## 2019-11-05 DIAGNOSIS — H65.01 RIGHT ACUTE SEROUS OTITIS MEDIA, RECURRENCE NOT SPECIFIED: Primary | ICD-10-CM

## 2019-11-05 PROBLEM — E66.01 MORBID OBESITY DUE TO EXCESS CALORIES (H): Status: RESOLVED | Noted: 2017-08-10 | Resolved: 2019-11-05

## 2019-11-05 PROCEDURE — 99213 OFFICE O/P EST LOW 20 MIN: CPT | Performed by: FAMILY MEDICINE

## 2019-11-05 RX ORDER — AMOXICILLIN 500 MG/1
500 CAPSULE ORAL 2 TIMES DAILY
Qty: 20 CAPSULE | Refills: 0 | Status: SHIPPED | OUTPATIENT
Start: 2019-11-05 | End: 2020-02-12

## 2019-11-05 RX ORDER — NEOMYCIN SULFATE, POLYMYXIN B SULFATE, HYDROCORTISONE 3.5; 10000; 1 MG/ML; [USP'U]/ML; MG/ML
3 SOLUTION/ DROPS AURICULAR (OTIC) 4 TIMES DAILY
Qty: 4 ML | Refills: 0 | Status: SHIPPED | OUTPATIENT
Start: 2019-11-05 | End: 2020-02-12

## 2019-11-05 ASSESSMENT — PAIN SCALES - GENERAL: PAINLEVEL: NO PAIN (0)

## 2019-11-05 NOTE — PROGRESS NOTES
Subjective     Armando Domingo is a 39 year old male who presents to clinic today for the following health issues:    HPI   Ear problem    Duration: Right ear pain    Description  Ear fullness    Severity: moderate    Accompanying signs and symptoms: None    History (predisposing factors):  none    Precipitating or alleviating factors: None    Therapies tried and outcome:  none      Patient Active Problem List   Diagnosis     Hypertension goal BP (blood pressure) < 140/90     Hyperlipidemia LDL goal <130     Hepatic steatosis     Alcohol dependence, continuous (H)     AMADEO (obstructive sleep apnea) Severe     Restless leg     Prediabetes     Microalbuminuria     Morbid obesity with BMI of 45.0-49.9, adult (H)     Past Surgical History:   Procedure Laterality Date     SOFT TISSUE SURGERY      cyst removal       Social History     Tobacco Use     Smoking status: Former Smoker     Packs/day: 0.10     Years: 15.00     Pack years: 1.50     Types: Cigarettes     Start date: 1996     Last attempt to quit: 2013     Years since quittin.9     Smokeless tobacco: Never Used   Substance Use Topics     Alcohol use: Yes     Alcohol/week: 40.0 standard drinks     Types: 40 Standard drinks or equivalent per week     Comment: advised cutting down 7/3/17     Family History   Problem Relation Age of Onset     Diabetes Mother      Hypertension Mother      Cancer - colorectal Maternal Grandfather      Diabetes Paternal Grandmother      Cerebrovascular Disease Paternal Grandmother      C.A.D. Father      Snoring Father      Asthma No family hx of      Breast Cancer No family hx of      Prostate Cancer No family hx of      Thyroid Disease No family hx of      Lipids No family hx of          Current Outpatient Medications   Medication Sig Dispense Refill     aspirin 81 MG tablet Take 1 tablet by mouth daily. 30 tablet 3     hydrochlorothiazide (HYDRODIURIL) 25 MG tablet Take 1 tablet (25 mg) by mouth daily 90 tablet 1      lisinopril (PRINIVIL/ZESTRIL) 40 MG tablet Take 1 tablet (40 mg) by mouth daily 90 tablet 1     rOPINIRole (REQUIP) 0.5 MG tablet Take 1 tablet (0.5 mg) by mouth At Bedtime 90 tablet 1     No Known Allergies  Recent Labs   Lab Test 08/20/19  0707 07/03/17  0702 05/09/16  0717   A1C 5.9*  --   --    LDL 90 121* 112*   HDL 35* 41 31*   TRIG 98 116 118   ALT 54 60 59   CR 1.18 1.09 1.09   GFRESTIMATED 78 77 78   GFRESTBLACK >90 >90   GFR Calc   >90   GFR Calc     POTASSIUM 4.3 4.1 4.0   TSH 2.27 2.70 3.30      BP Readings from Last 3 Encounters:   11/08/19 115/76   11/05/19 102/62   08/28/19 119/78    Wt Readings from Last 3 Encounters:   11/08/19 (!) 165.1 kg (364 lb)   11/05/19 (!) 165.6 kg (365 lb)   08/28/19 (!) 168.5 kg (371 lb 8 oz)                    Reviewed and updated as needed this visit by Provider  Tobacco  Allergies  Meds  Problems  Med Hx  Surg Hx  Fam Hx         Review of Systems   ROS COMP: Constitutional, HEENT, cardiovascular, pulmonary, GI, , musculoskeletal, neuro, skin, endocrine and psych systems are negative, except as otherwise noted.      Objective    /62   Pulse 87   Temp 98.4  F (36.9  C) (Oral)   Wt (!) 165.6 kg (365 lb)   SpO2 97%   BMI 49.50 kg/m    Body mass index is 49.5 kg/m .  Physical Exam   GENERAL: healthy, alert and no distress  EYES: Eyes grossly normal to inspection, PERRL and conjunctivae and sclerae normal  HENT: right ear: clear effusion and erythematous  NECK: no adenopathy, no asymmetry, masses, or scars and thyroid normal to palpation  RESP: lungs clear to auscultation - no rales, rhonchi or wheezes  CV: regular rate and rhythm, normal S1 S2, no S3 or S4, no murmur, click or rub, no peripheral edema and peripheral pulses strong  ABDOMEN: soft, nontender, no hepatosplenomegaly, no masses and bowel sounds normal  MS: no gross musculoskeletal defects noted, no edema          Assessment & Plan     1. Right acute serous otitis  media, recurrence not specified  Symptomatic therapy suggested: push fluids, rest, use acetaminophen, ibuprofen, decongestant of choice as needed and Return office visit if symptoms persist or worsen.   - amoxicillin (AMOXIL) 500 MG capsule; Take 1 capsule (500 mg) by mouth 2 times daily for 10 days  Dispense: 20 capsule; Refill: 0  - neomycin-polymyxin-hydrocortisone (CORTISPORIN) 3.5-87858-9 otic solution; Place 3 drops in ear(s) 4 times daily for 7 days  Dispense: 4 mL; Refill: 0         Return if symptoms worsen or fail to improve.    Iron Pope MD  [facility]

## 2019-11-08 ENCOUNTER — OFFICE VISIT (OUTPATIENT)
Dept: FAMILY MEDICINE | Facility: CLINIC | Age: 39
End: 2019-11-08
Payer: COMMERCIAL

## 2019-11-08 VITALS
WEIGHT: 315 LBS | RESPIRATION RATE: 18 BRPM | DIASTOLIC BLOOD PRESSURE: 76 MMHG | HEIGHT: 72 IN | BODY MASS INDEX: 42.66 KG/M2 | HEART RATE: 89 BPM | TEMPERATURE: 98.2 F | OXYGEN SATURATION: 98 % | SYSTOLIC BLOOD PRESSURE: 115 MMHG

## 2019-11-08 DIAGNOSIS — Z23 ENCOUNTER FOR IMMUNIZATION: ICD-10-CM

## 2019-11-08 DIAGNOSIS — M25.562 ACUTE PAIN OF LEFT KNEE: Primary | ICD-10-CM

## 2019-11-08 PROCEDURE — 99213 OFFICE O/P EST LOW 20 MIN: CPT | Mod: 25 | Performed by: FAMILY MEDICINE

## 2019-11-08 PROCEDURE — 90471 IMMUNIZATION ADMIN: CPT | Performed by: FAMILY MEDICINE

## 2019-11-08 PROCEDURE — 90682 RIV4 VACC RECOMBINANT DNA IM: CPT | Performed by: FAMILY MEDICINE

## 2019-11-08 ASSESSMENT — PAIN SCALES - GENERAL: PAINLEVEL: MILD PAIN (2)

## 2019-11-08 ASSESSMENT — MIFFLIN-ST. JEOR: SCORE: 2614.09

## 2019-11-08 NOTE — PATIENT INSTRUCTIONS
At [facility], we strive to deliver an exceptional experience to you, every time we see you.  If you receive a survey in the mail, please send us back your thoughts. We really do value your feedback.    Based on your medical history, these are the current health maintenance/preventive care services that you are due for (some may have been done at this visit.)  Health Maintenance Due   Topic Date Due     HIV SCREENING  08/05/1997     PNEUMOCOCCAL IMMUNIZATION 19-64 MEDIUM RISK (1 of 1 - PPSV23) 08/05/2001     PREVENTIVE CARE VISIT  06/12/2019     INFLUENZA VACCINE (1) 09/01/2019         Suggested websites for health information:  Www.[place].org : Up to date and easily searchable information on multiple topics.  Www.medlineplus.gov : medication info, interactive tutorials, watch real surgeries online  Www.familydoctor.org : good info from the Academy of Family Physicians  Www.cdc.gov : public health info, travel advisories, epidemics (H1N1)  Www.aap.org : children's health info, normal development, vaccinations  Www.health.[facility].[place].us : [place] dept of health, public health issues in [place], N1N1    Your care team:                            Family Medicine Internal Medicine   MD Arun Ramsey MD Shantel Branch-Fleming, MD Katya Georgiev PA-C Nam Ho, MD Pediatrics   STEPHENIE Gustafson, JULIETA Albrecht APRMD Karolina Pak CNP, MD Deborah Mielke, MD Kim Thein, APRN [facility]      Clinic hours: Monday - Thursday 7 am-7 pm; Fridays 7 am-5 pm.   Urgent care: Monday - Friday 11 am-9 pm; Saturday and Sunday 9 am-5 pm.  Pharmacy : Monday -Thursday 8 am-8 pm; Friday 8 am-6 pm; Saturday and Sunday 9 am-5 pm.     Clinic: [phone]   Pharmacy: [phone]

## 2019-11-08 NOTE — PROGRESS NOTES
Subjective     Armando Domingo is a 39 year old male who presents to clinic today for the following health issues:    HPI   Joint Pain    Onset: 1 night ago    Description:   Location: left knee  Character: Dull ache    Intensity: mild    Progression of Symptoms: intermittent    Accompanying Signs & Symptoms:  Other symptoms: swelling    History:   Previous similar pain: no       Precipitating factors:   Trauma or overuse: no     Alleviating factors:  Improved by: nothing    Therapies Tried and outcome: None        Patient Active Problem List   Diagnosis     Hypertension goal BP (blood pressure) < 140/90     Hyperlipidemia LDL goal <130     Hepatic steatosis     Alcohol dependence, continuous (H)     AMADEO (obstructive sleep apnea) Severe     Restless leg     Prediabetes     Microalbuminuria     Morbid obesity with BMI of 45.0-49.9, adult (H)     Past Surgical History:   Procedure Laterality Date     SOFT TISSUE SURGERY      cyst removal       Social History     Tobacco Use     Smoking status: Former Smoker     Packs/day: 0.10     Years: 15.00     Pack years: 1.50     Types: Cigarettes     Start date: 1996     Last attempt to quit: 2013     Years since quittin.8     Smokeless tobacco: Never Used   Substance Use Topics     Alcohol use: Yes     Alcohol/week: 40.0 standard drinks     Types: 40 Standard drinks or equivalent per week     Comment: advised cutting down 7/3/17     Family History   Problem Relation Age of Onset     Diabetes Mother      Hypertension Mother      Cancer - colorectal Maternal Grandfather      Diabetes Paternal Grandmother      Cerebrovascular Disease Paternal Grandmother      C.A.D. Father      Snoring Father      Asthma No family hx of      Breast Cancer No family hx of      Prostate Cancer No family hx of      Thyroid Disease No family hx of      Lipids No family hx of            Reviewed and updated as needed this visit by Provider         Review of Systems   ROS COMP:  Constitutional, HEENT, cardiovascular, pulmonary, GI, , musculoskeletal, neuro, skin, endocrine and psych systems are negative, except as otherwise noted.      Objective    /76 (BP Location: Left arm, Patient Position: Chair, Cuff Size: Adult Large)   Pulse 89   Temp 98.2  F (36.8  C) (Oral)   Resp 18   Ht 1.829 m (6')   Wt (!) 165.1 kg (364 lb)   SpO2 98%   BMI 49.37 kg/m    Body mass index is 49.37 kg/m .  Physical Exam   GENERAL: healthy, alert and no distress  NECK: no adenopathy, no asymmetry, masses, or scars and thyroid normal to palpation  RESP: lungs clear to auscultation - no rales, rhonchi or wheezes  CV: regular rate and rhythm, normal S1 S2, no S3 or S4, no murmur, click or rub, no peripheral edema and peripheral pulses strong  ABDOMEN: soft, nontender, no hepatosplenomegaly, no masses and bowel sounds normal  MS: mild tenderness superior to left patella  Diagnostic Test Results:  Labs reviewed in Epic        Assessment & Plan     1. Acute pain of left knee  Likely tendonitis, mild. May take ibuprofen as needed. Discussed icing as well. RTC as needed.    2. Encounter for immunization    - FLU VAC, QUADRIVALENT (RIV4) RECOMBINANT DNA, IM  - ADMIN 1st VACCINE     BMI:   Estimated body mass index is 49.37 kg/m  as calculated from the following:    Height as of this encounter: 1.829 m (6').    Weight as of this encounter: 165.1 kg (364 lb).           Work on weight loss  Regular exercise  See Patient Instructions    Return in about 4 weeks (around 12/6/2019) for as needed.    Brenden Tucker MD, MD  [facility]

## 2020-02-12 ENCOUNTER — OFFICE VISIT (OUTPATIENT)
Dept: PEDIATRICS | Facility: CLINIC | Age: 40
End: 2020-02-12
Payer: COMMERCIAL

## 2020-02-12 VITALS
HEART RATE: 87 BPM | BODY MASS INDEX: 49.1 KG/M2 | DIASTOLIC BLOOD PRESSURE: 73 MMHG | SYSTOLIC BLOOD PRESSURE: 119 MMHG | TEMPERATURE: 98.2 F | WEIGHT: 315 LBS | OXYGEN SATURATION: 96 %

## 2020-02-12 DIAGNOSIS — I10 HYPERTENSION GOAL BP (BLOOD PRESSURE) < 140/90: ICD-10-CM

## 2020-02-12 DIAGNOSIS — J06.9 VIRAL UPPER RESPIRATORY TRACT INFECTION: Primary | ICD-10-CM

## 2020-02-12 PROCEDURE — 99213 OFFICE O/P EST LOW 20 MIN: CPT | Performed by: INTERNAL MEDICINE

## 2020-02-12 NOTE — PROGRESS NOTES
Subjective     Armando Domingo is a 39 year old male who presents to clinic today for the following health issues:    HPI   RESPIRATORY SYMPTOMS      Duration: yesterday    Description  nasal congestion, rhinorrhea, sore throat, cough, fatigue/malaise and myalgias    Severity: moderate    Accompanying signs and symptoms: None    History (predisposing factors):  none    Precipitating or alleviating factors: Hard to breath, chest tight,stomach hurts from coughing    Therapies tried and outcome:  rest and fluids cough drops    No history of asthma, not a smoker.  Has not taken any over-the-counter decongestant due to history of high blood pressure.    Congestion symptoms started 3 days ago with a low-grade temp to 100 Fahrenheit, and that went away.  Cough started last night.    ROS:  Constitutional, HEENT, cardiovascular, pulmonary, gi and gu systems are negative, except as otherwise noted.       aspirin 81 MG tablet, Take 1 tablet by mouth daily.  hydrochlorothiazide (HYDRODIURIL) 25 MG tablet, Take 1 tablet (25 mg) by mouth daily  lisinopril (PRINIVIL/ZESTRIL) 40 MG tablet, Take 1 tablet (40 mg) by mouth daily  rOPINIRole (REQUIP) 0.5 MG tablet, Take 1 tablet (0.5 mg) by mouth At Bedtime    No current facility-administered medications on file prior to visit.          Patient Active Problem List   Diagnosis     Hypertension goal BP (blood pressure) < 140/90     Hyperlipidemia LDL goal <130     Hepatic steatosis     Alcohol dependence, continuous (H)     AMADEO (obstructive sleep apnea) Severe     Restless leg     Prediabetes     Microalbuminuria     Morbid obesity with BMI of 45.0-49.9, adult (H)     Past Surgical History:   Procedure Laterality Date     SOFT TISSUE SURGERY      cyst removal       Social History     Tobacco Use     Smoking status: Former Smoker     Packs/day: 0.10     Years: 15.00     Pack years: 1.50     Types: Cigarettes     Start date: 1/1/1996     Last attempt to quit: 1/1/2013     Years since  quittin.1     Smokeless tobacco: Never Used   Substance Use Topics     Alcohol use: Yes     Alcohol/week: 40.0 standard drinks     Types: 40 Standard drinks or equivalent per week     Comment: advised cutting down 7/3/17     Family History   Problem Relation Age of Onset     Diabetes Mother      Hypertension Mother      Cancer - colorectal Maternal Grandfather      Diabetes Paternal Grandmother      Cerebrovascular Disease Paternal Grandmother      C.A.D. Father      Snoring Father      Asthma No family hx of      Breast Cancer No family hx of      Prostate Cancer No family hx of      Thyroid Disease No family hx of      Lipids No family hx of              Problem list, Medication list, Allergies, and Medical/Social/Surgical histories reviewed in [facility] and updated as appropriate.    OBJECTIVE:                                                    /73   Pulse 87   Temp 98.2  F (36.8  C) (Temporal)   Wt (!) 164.2 kg (362 lb)   SpO2 96%   BMI 49.10 kg/m      GENERAL: healthy, alert and no distress  HEENT: Moderate nasal congestion  Lung: clear, no wheezing/rhonchi/crackles  Heart: RRR, normal S1/2, no murmur/gallop/rup        Diagnostic test results:  No results found for any visits on 20.      ASSESSMENT/PLAN:                                                      39 year old male with the following diagnoses and treatment plan:      ICD-10-CM    1. Viral upper respiratory tract infection J06.9    2. Hypertension goal BP (blood pressure) < 140/90 I10        --Upper respiratory infection, supportive care, see home care instruction and AVS.  His blood pressure is very well controlled with medication.  He will be okay to use over-the-counter multi symptom cough and cold medication.  Avoid pseudoephedrine does behind the counter.    Will call or return to clinic if worsening or symptoms not improving as discussed.  See Patient Instructions.      Natali Tucker MD-PhD  [facility]    (Note: Chart  documentation was done in part with Dragon Voice Recognition software. Although reviewed after completion, some word and grammatical errors may remain.)

## 2020-02-12 NOTE — PATIENT INSTRUCTIONS
Get multi-symptom cough/congestion medication such as Theraflu (or equivalent).         Patient Education     Viral Upper Respiratory Illness (Adult)    You have a viral upper respiratory illness (URI), which is another term for the common cold. This illness is contagious during the first few days. It is spread through the air by coughing and sneezing. It may also be spread by direct contact (touching the sick person and then touching your own eyes, nose, or mouth). Frequent handwashing will decrease risk of spread. Most viral illnesses go away within 7 to 10 days with rest and simple home remedies. Sometimes the illness may last for several weeks. Antibiotics will not kill a virus, and they are generally not prescribed for this condition.  Home care    If symptoms are severe, rest at home for the first 2 to 3 days. When you resume activity, don't let yourself get too tired.    Don't smoke. If you need help stopping, talk with your healthcare provider.    Avoid being exposed to cigarette smoke (yours or others ).    You may use acetaminophen or ibuprofen to control pain and fever, unless another medicine was prescribed. If you have chronic liver or kidney disease, have ever had a stomach ulcer or gastrointestinal bleeding, or are taking blood-thinning medicines, talk with your healthcare provider before using these medicines. Aspirin should never be given to anyone under 18 years of age who is ill with a viral infection or fever. It may cause severe liver or brain damage.    Your appetite may be poor, so a light diet is fine. Stay well hydrated by drinking 6 to 8 glasses of fluids per day (water, soft drinks, juices, tea, or soup). Extra fluids will help loosen secretions in the nose and lungs.    Over-the-counter cold medicines will not shorten the length of time you re sick, but they may be helpful for the following symptoms: cough, sore throat, and nasal and sinus congestion. If you take prescription medicines,  ask your healthcare provider or pharmacist which over-the-counter medicines are safe to use. (Note: Don't use decongestants if you have high blood pressure.)  Follow-up care  Follow up with your healthcare provider, or as advised.  When to seek medical advice  Call your healthcare provider right away if any of these occur:    Cough with lots of colored sputum (mucus)    Severe headache; face, neck, or ear pain    Difficulty swallowing due to throat pain    Fever of 100.4 F (38 C) or higher, or as directed by your healthcare provider  Call 911  Call 911 if any of these occur:    Chest pain, shortness of breath, wheezing, or difficulty breathing    Coughing up blood    Very severe pain with swallowing, especially if it goes along with a muffled voice   Date Last Reviewed: 6/1/2018 2000-2019 [employer]. [address] [place] [address], [place] [address]. All rights reserved. This information is not intended as a substitute for professional medical care. Always follow your healthcare professional's instructions.

## 2020-02-23 ENCOUNTER — HEALTH MAINTENANCE LETTER (OUTPATIENT)
Age: 40
End: 2020-02-23

## 2020-02-24 DIAGNOSIS — R73.03 PREDIABETES: Primary | ICD-10-CM

## 2020-03-04 ENCOUNTER — OFFICE VISIT (OUTPATIENT)
Dept: PEDIATRICS | Facility: CLINIC | Age: 40
End: 2020-03-04
Payer: COMMERCIAL

## 2020-03-04 VITALS
HEART RATE: 84 BPM | HEIGHT: 71 IN | DIASTOLIC BLOOD PRESSURE: 80 MMHG | OXYGEN SATURATION: 97 % | TEMPERATURE: 98.7 F | BODY MASS INDEX: 44.1 KG/M2 | WEIGHT: 315 LBS | SYSTOLIC BLOOD PRESSURE: 121 MMHG

## 2020-03-04 DIAGNOSIS — J20.9 ACUTE BRONCHITIS, UNSPECIFIED ORGANISM: ICD-10-CM

## 2020-03-04 DIAGNOSIS — R73.03 PREDIABETES: ICD-10-CM

## 2020-03-04 DIAGNOSIS — G25.81 RESTLESS LEG: ICD-10-CM

## 2020-03-04 DIAGNOSIS — G47.33 OSA (OBSTRUCTIVE SLEEP APNEA): ICD-10-CM

## 2020-03-04 DIAGNOSIS — R05.9 COUGH: ICD-10-CM

## 2020-03-04 DIAGNOSIS — I10 HYPERTENSION GOAL BP (BLOOD PRESSURE) < 140/90: ICD-10-CM

## 2020-03-04 DIAGNOSIS — R80.9 MICROALBUMINURIA: ICD-10-CM

## 2020-03-04 DIAGNOSIS — K76.0 HEPATIC STEATOSIS: ICD-10-CM

## 2020-03-04 DIAGNOSIS — Z00.00 ROUTINE GENERAL MEDICAL EXAMINATION AT A HEALTH CARE FACILITY: Primary | ICD-10-CM

## 2020-03-04 DIAGNOSIS — E66.01 MORBID OBESITY WITH BMI OF 45.0-49.9, ADULT (H): ICD-10-CM

## 2020-03-04 DIAGNOSIS — E78.5 HYPERLIPIDEMIA LDL GOAL <130: ICD-10-CM

## 2020-03-04 LAB
GLUCOSE SERPL-MCNC: 79 MG/DL (ref 70–99)
HBA1C MFR BLD: 5.9 % (ref 0–5.6)

## 2020-03-04 PROCEDURE — 99395 PREV VISIT EST AGE 18-39: CPT | Performed by: FAMILY MEDICINE

## 2020-03-04 PROCEDURE — 83036 HEMOGLOBIN GLYCOSYLATED A1C: CPT | Performed by: FAMILY MEDICINE

## 2020-03-04 PROCEDURE — 36415 COLL VENOUS BLD VENIPUNCTURE: CPT | Performed by: FAMILY MEDICINE

## 2020-03-04 PROCEDURE — 82947 ASSAY GLUCOSE BLOOD QUANT: CPT | Performed by: FAMILY MEDICINE

## 2020-03-04 PROCEDURE — 99213 OFFICE O/P EST LOW 20 MIN: CPT | Mod: 25 | Performed by: FAMILY MEDICINE

## 2020-03-04 RX ORDER — LISINOPRIL 40 MG/1
40 TABLET ORAL DAILY
Qty: 90 TABLET | Refills: 1 | Status: SHIPPED | OUTPATIENT
Start: 2020-03-04 | End: 2020-09-15

## 2020-03-04 RX ORDER — ROPINIROLE 0.5 MG/1
0.5 TABLET, FILM COATED ORAL AT BEDTIME
Qty: 90 TABLET | Refills: 1 | Status: SHIPPED | OUTPATIENT
Start: 2020-03-04 | End: 2020-09-15

## 2020-03-04 RX ORDER — DOXYCYCLINE 100 MG/1
100 TABLET ORAL 2 TIMES DAILY
Qty: 20 TABLET | Refills: 0 | Status: SHIPPED | OUTPATIENT
Start: 2020-03-04 | End: 2020-08-11

## 2020-03-04 RX ORDER — HYDROCHLOROTHIAZIDE 25 MG/1
25 TABLET ORAL DAILY
Qty: 90 TABLET | Refills: 1 | Status: SHIPPED | OUTPATIENT
Start: 2020-03-04 | End: 2020-09-15

## 2020-03-04 RX ORDER — BENZONATATE 200 MG/1
200 CAPSULE ORAL 3 TIMES DAILY PRN
Qty: 30 CAPSULE | Refills: 0 | Status: SHIPPED | OUTPATIENT
Start: 2020-03-04 | End: 2020-08-11

## 2020-03-04 ASSESSMENT — MIFFLIN-ST. JEOR: SCORE: 2619.87

## 2020-03-04 ASSESSMENT — PAIN SCALES - GENERAL: PAINLEVEL: NO PAIN (0)

## 2020-03-04 NOTE — PROGRESS NOTES
3  SUBJECTIVE:   CC: Armando Domingo is an 39 year old male who presents for preventive health visit.     Healthy Habits: Patient is here for annual physical and with other concerns as mentioned below    ACUTE BRONCHITIS-complaining of ongoing, cough productive of yellow-green sputum with no blood in it associated with mild fatigue but with no associated concerns for fever, chills, shortness of breath, chest pain, chest pressure, palpitations, postnasal drip, sinus headaches, ear symptoms, wheezing.  Does not smoke. Has no h/o asthnma or allergies.  Patient is up-to-date on vaccinations including influenza vaccination  Past medical history significant for morbid obesity, sleep apnea, hypertension, hyperlipidemia, prediabetes.  Patient reported not using the CPAP for the last 1 year due to inconvenience but he has not followed up with the sleep specialist either to discuss about other options    Do you get at least three servings of calcium containing foods daily (dairy, green leafy vegetables, etc.)? no, taking calcium and/or vitamin D supplement: no    Amount of exercise or daily activities, outside of work: 0 day(s) per week    Problems taking medications regularly No    Medication side effects: No    Have you had an eye exam in the past two years? yes    Do you see a dentist twice per year? no  Do you have sleep apnea, excessive snoring or daytime drowsiness?yes    QUESTIONS/ CONCERNS:   1. Cough x 3.5 weeks    Hyperlipidemia Follow-Up    Are you regularly taking any medication or supplement to lower your cholesterol?   No    Are you having muscle aches or other side effects that you think could be caused by your cholesterol lowering medication?  No    Hypertension Follow-up    Do you check your blood pressure regularly outside of the clinic? No     Are you following a low salt diet? No    Are your blood pressures ever more than 140 on the top number (systolic) OR more   than 90 on the bottom number (diastolic),  for example 140/90? No        Today's PHQ-2 Score:   PHQ-2 (  [employer]) 3/4/2020 2020   Q1: Little interest or pleasure in doing things 0 0   Q2: Feeling down, depressed or hopeless 0 0   PHQ-2 Score 0 0     Abuse: Current or Past(Physical, Sexual or Emotional)- No  Do you feel safe in your environment? Yes        Social History     Tobacco Use     Smoking status: Former Smoker     Packs/day: 0.10     Years: 15.00     Pack years: 1.50     Types: Cigarettes     Start date: 1996     Last attempt to quit: 2013     Years since quittin.1     Smokeless tobacco: Never Used   Substance Use Topics     Alcohol use: Yes     Alcohol/week: 40.0 standard drinks     Types: 40 Standard drinks or equivalent per week     Comment: advised cutting down 7/3/17     If you drink alcohol do you typically have >3 drinks per day or >7 drinks per week? No                      Last PSA: No results found for: PSA    Reviewed orders with patient. Reviewed health maintenance and updated orders accordingly - Yes  Lab work is in process  Labs reviewed in EPIC  BP Readings from Last 3 Encounters:   20 121/80   20 119/73   19 115/76    Wt Readings from Last 3 Encounters:   20 (!) 166.9 kg (367 lb 14.4 oz)   20 (!) 164.2 kg (362 lb)   19 (!) 165.1 kg (364 lb)                  Patient Active Problem List   Diagnosis     Hypertension goal BP (blood pressure) < 140/90     Hyperlipidemia LDL goal <130     Hepatic steatosis     Alcohol dependence, continuous (H)     AMADEO (obstructive sleep apnea) Severe     Restless leg     Prediabetes     Microalbuminuria     Morbid obesity with BMI of 45.0-49.9, adult (H)     Past Surgical History:   Procedure Laterality Date     SOFT TISSUE SURGERY      cyst removal       Social History     Tobacco Use     Smoking status: Former Smoker     Packs/day: 0.10     Years: 15.00     Pack years: 1.50     Types: Cigarettes     Start date: 1996     Last attempt to quit:  2013     Years since quittin.1     Smokeless tobacco: Never Used   Substance Use Topics     Alcohol use: Yes     Alcohol/week: 40.0 standard drinks     Types: 40 Standard drinks or equivalent per week     Comment: advised cutting down 7/3/17     Family History   Problem Relation Age of Onset     Diabetes Mother      Hypertension Mother      Cancer - colorectal Maternal Grandfather      Diabetes Paternal Grandmother      Cerebrovascular Disease Paternal Grandmother      C.A.D. Father      Snoring Father      Asthma No family hx of      Breast Cancer No family hx of      Prostate Cancer No family hx of      Thyroid Disease No family hx of      Lipids No family hx of          Current Outpatient Medications   Medication Sig Dispense Refill     aspirin 81 MG tablet Take 1 tablet by mouth daily. 30 tablet 3     benzonatate (TESSALON) 200 MG capsule Take 1 capsule (200 mg) by mouth 3 times daily as needed for cough 30 capsule 0     doxycycline monohydrate (ADOXA) 100 MG tablet Take 1 tablet (100 mg) by mouth 2 times daily for 10 days 20 tablet 0     hydrochlorothiazide (HYDRODIURIL) 25 MG tablet Take 1 tablet (25 mg) by mouth daily 90 tablet 1     lisinopril (ZESTRIL) 40 MG tablet Take 1 tablet (40 mg) by mouth daily 90 tablet 1     rOPINIRole (REQUIP) 0.5 MG tablet Take 1 tablet (0.5 mg) by mouth At Bedtime 90 tablet 1     No Known Allergies  Recent Labs   Lab Test 20  1709 19  0707 17  0702 16  0717   A1C 5.9* 5.9*  --   --    LDL  --  90 121* 112*   HDL  --  35* 41 31*   TRIG  --  98 116 118   ALT  --  54 60 59   CR  --  1.18 1.09 1.09   GFRESTIMATED  --  78 77 78   GFRESTBLACK  --  >90 >90   GFR Calc   >90   GFR Calc     POTASSIUM  --  4.3 4.1 4.0   TSH  --  2.27 2.70 3.30        Reviewed and updated as needed this visit by clinical staff  Tobacco  Allergies  Meds  Med Hx  Surg Hx  Fam Hx  Soc Hx        Reviewed and updated as needed this visit by  "Provider          Past Medical History:   Diagnosis Date     Reflux       Past Surgical History:   Procedure Laterality Date     SOFT TISSUE SURGERY      cyst removal     OB History   No obstetric history on file.       ROS:  CONSTITUTIONAL: NEGATIVE for fever, chills, change in weight  INTEGUMENTARY/SKIN: NEGATIVE for worrisome rashes, moles or lesions  EYES: NEGATIVE for vision changes or irritation  ENT: NEGATIVE for ear, mouth and throat problems  RESP:as above  CV: Hx HTN  GI: NEGATIVE for nausea, abdominal pain, heartburn, or change in bowel habits   male: negative for dysuria, hematuria, decreased urinary stream, erectile dysfunction, urethral discharge  MUSCULOSKELETAL: NEGATIVE for significant arthralgias or myalgia  NEURO: NEGATIVE for weakness, dizziness or paresthesias  ENDOCRINE: NEGATIVE for temperature intolerance, skin/hair changes  HEME/ALLERGY/IMMUNE: NEGATIVE for bleeding problems  PSYCHIATRIC: NEGATIVE for changes in mood or affect    OBJECTIVE:   /80 (BP Location: Right arm, Patient Position: Sitting, Cuff Size: Adult Large)   Pulse 84   Temp 98.7  F (37.1  C) (Oral)   Ht 1.81 m (5' 11.25\")   Wt (!) 166.9 kg (367 lb 14.4 oz)   SpO2 97%   BMI 50.95 kg/m    EXAM:  GENERAL: healthy, alert, no distress and morbidly obese  EYES: Eyes grossly normal to inspection, PERRL and conjunctivae and sclerae normal  HENT: ear canals and TM's normal, nose and mouth without ulcers or lesions  NECK: no adenopathy, no asymmetry, masses, or scars and thyroid normal to palpation  RESP: lungs clear to auscultation - no rales, rhonchi or wheezes  CV: regular rate and rhythm, normal S1 S2, no S3 or S4, no murmur, click or rub, no peripheral edema and peripheral pulses strong  ABDOMEN: soft, nontender, no hepatosplenomegaly, no masses and bowel sounds normal  MS: no gross musculoskeletal defects noted, no edema  SKIN: no suspicious lesions or rashes  NEURO: Normal strength and tone, mentation intact and " speech normal  PSYCH: mentation appears normal, affect normal/bright    Diagnostic Test Results:  Labs reviewed in Epic  Results for orders placed or performed in visit on 03/04/20 (from the past 24 hour(s))   **Glucose FUTURE anytime   Result Value Ref Range    Glucose 79 70 - 99 mg/dL   **A1C FUTURE anytime   Result Value Ref Range    Hemoglobin A1C 5.9 (H) 0 - 5.6 %       ASSESSMENT/PLAN:   1. Routine general medical examination at a health care facility  : Discussed on regular exercises, healthy eating, self testicular exams  and routine dental checks.      2. Restless leg  Stable, continue with Requip 0.5 mg at bedtime, recheck in 6 months or sooner if needed  - rOPINIRole (REQUIP) 0.5 MG tablet; Take 1 tablet (0.5 mg) by mouth At Bedtime  Dispense: 90 tablet; Refill: 1    3. Hypertension goal BP (blood pressure) < 140/90  BP Readings from Last 6 Encounters:   03/04/20 121/80   02/12/20 119/73   11/08/19 115/76   11/05/19 102/62   08/28/19 119/78   06/21/19 109/77     Blood pressure is at goal, continue with current medications  Will follow low salt diet, weight loss and regular exercises.  Recheck in 6 months along with fasting labs  - lisinopril (ZESTRIL) 40 MG tablet; Take 1 tablet (40 mg) by mouth daily  Dispense: 90 tablet; Refill: 1  - hydrochlorothiazide (HYDRODIURIL) 25 MG tablet; Take 1 tablet (25 mg) by mouth daily  Dispense: 90 tablet; Refill: 1    4. Microalbuminuria  Lab Results   Component Value Date    MICROL 56 08/20/2019     No results found for: MICROALBUMIN    - lisinopril (ZESTRIL) 40 MG tablet; Take 1 tablet (40 mg) by mouth daily  Dispense: 90 tablet; Refill: 1    5. Cough  Likely from acute bronchitis  Recommended to start on doxycycline twice a day for the next 10 days, take Tessalon 3 times daily as needed for cough  Push fluids   follow-up if cough is not any better in 2 weeks or sooner if needed  Dosing and potential medication side effects discussed.  Patient verbalised understanding  and is agreeable to the plan.      - doxycycline monohydrate (ADOXA) 100 MG tablet; Take 1 tablet (100 mg) by mouth 2 times daily for 10 days  Dispense: 20 tablet; Refill: 0  - benzonatate (TESSALON) 200 MG capsule; Take 1 capsule (200 mg) by mouth 3 times daily as needed for cough  Dispense: 30 capsule; Refill: 0    6. Acute bronchitis, unspecified organism  .  as above    - doxycycline monohydrate (ADOXA) 100 MG tablet; Take 1 tablet (100 mg) by mouth 2 times daily for 10 days  Dispense: 20 tablet; Refill: 0  - benzonatate (TESSALON) 200 MG capsule; Take 1 capsule (200 mg) by mouth 3 times daily as needed for cough  Dispense: 30 capsule; Refill: 0    7. AMADEO (obstructive sleep apnea)  Emphasized on following up with Dr. Nicholson at the sleep center to discuss about further management of his sleep apnea  Patient verbalised understanding and is agreeable to the plan.      8. Morbid obesity with BMI of 45.0-49.9, adult (H)  Wt Readings from Last 5 Encounters:   03/04/20 (!) 166.9 kg (367 lb 14.4 oz)   02/12/20 (!) 164.2 kg (362 lb)   11/08/19 (!) 165.1 kg (364 lb)   11/05/19 (!) 165.6 kg (365 lb)   08/28/19 (!) 168.5 kg (371 lb 8 oz)     Emphasized on weight loss, portion control, low calorie and low fat diet, healthy eating, regular exercises. Offered dietary consult, declined. Encouraged to enroll in a weight loss program for tracking on meal planning and weight.      9. Prediabetes  Results for orders placed or performed in visit on 03/04/20   **Glucose FUTURE anytime     Status: None   Result Value Ref Range    Glucose 79 70 - 99 mg/dL   **A1C FUTURE anytime     Status: Abnormal   Result Value Ref Range    Hemoglobin A1C 5.9 (H) 0 - 5.6 %     Reviewed normal fasting blood sugar but elevated A1c consistent with prediabetes  Emphasized on weight loss, portion control, low calorie and low fat diet, healthy eating, regular exercises.      10. Hyperlipidemia LDL goal <130  LDL Cholesterol Calculated   Date Value Ref  "Range Status   08/20/2019 90 <100 mg/dL Final     Comment:     Desirable:       <100 mg/dl         11. Hepatic steatosis  Emphasized on weight loss, eating a diet free of sugar, simple starches, saturated fats, start on regular exercises and weight loss, avoid alcohol      COUNSELING:  Reviewed preventive health counseling, as reflected in patient instructions  Special attention given to:        Regular exercise       Healthy diet/nutrition       Vision screening    Estimated body mass index is 50.95 kg/m  as calculated from the following:    Height as of this encounter: 1.81 m (5' 11.25\").    Weight as of this encounter: 166.9 kg (367 lb 14.4 oz).    Weight management plan: Discussed healthy diet and exercise guidelines     reports that he quit smoking about 7 years ago. His smoking use included cigarettes. He started smoking about 24 years ago. He has a 1.50 pack-year smoking history. He has never used smokeless tobacco.      Counseling Resources:  ATP IV Guidelines  Pooled Cohorts Equation Calculator  FRAX Risk Assessment  ICSI Preventive Guidelines  Dietary Guidelines for Americans, 2010  USDA's MyPlate  ASA Prophylaxis  Lung CA Screening    Yogesh Zaldivar MD  [facility]  Chart documentation done in part with Dragon Voice recognition Software. Although reviewed after completion, some word and grammatical error may remain.  Chart forwarded to PCP for FYI     "

## 2020-03-04 NOTE — PATIENT INSTRUCTIONS
Start on DOXYCYCLINE for 10 days  Take tessalon as needed for cough      Schedule for fasting labs and recheck in 6 months  Schedule for sleep consult with Dr. Nicholson      Preventive Health Recommendations  Male Ages 26 - 39    Yearly exam:             See your health care provider every year in order to  o   Review health changes.   o   Discuss preventive care.    o   Review your medicines if your doctor has prescribed any.    You should be tested each year for STDs (sexually transmitted diseases), if you re at risk.     After age 35, talk to your provider about cholesterol testing. If you are at risk for heart disease, have your cholesterol tested at least every 5 years.     If you are at risk for diabetes, you should have a diabetes test (fasting glucose).  Shots: Get a flu shot each year. Get a tetanus shot every 10 years.     Nutrition:    Eat at least 5 servings of fruits and vegetables daily.     Eat whole-grain bread, whole-wheat pasta and brown rice instead of white grains and rice.     Get adequate Calcium and Vitamin D.     Lifestyle    Exercise for at least 150 minutes a week (30 minutes a day, 5 days a week). This will help you control your weight and prevent disease.     Limit alcohol to one drink per day.     No smoking.     Wear sunscreen to prevent skin cancer.     See your dentist every six months for an exam and cleaning.

## 2020-03-13 ENCOUNTER — MYC MEDICAL ADVICE (OUTPATIENT)
Dept: PEDIATRICS | Facility: CLINIC | Age: 40
End: 2020-03-13

## 2020-08-11 ENCOUNTER — OFFICE VISIT (OUTPATIENT)
Dept: FAMILY MEDICINE | Facility: CLINIC | Age: 40
End: 2020-08-11
Payer: COMMERCIAL

## 2020-08-11 VITALS
DIASTOLIC BLOOD PRESSURE: 82 MMHG | BODY MASS INDEX: 44.1 KG/M2 | WEIGHT: 315 LBS | HEART RATE: 82 BPM | TEMPERATURE: 97.8 F | OXYGEN SATURATION: 97 % | RESPIRATION RATE: 20 BRPM | SYSTOLIC BLOOD PRESSURE: 112 MMHG | HEIGHT: 71 IN

## 2020-08-11 DIAGNOSIS — L72.3 INFECTED SEBACEOUS CYST: Primary | ICD-10-CM

## 2020-08-11 DIAGNOSIS — L08.9 INFECTED SEBACEOUS CYST: Primary | ICD-10-CM

## 2020-08-11 PROCEDURE — 99213 OFFICE O/P EST LOW 20 MIN: CPT | Performed by: PHYSICIAN ASSISTANT

## 2020-08-11 RX ORDER — CEPHALEXIN 500 MG/1
500 CAPSULE ORAL 3 TIMES DAILY
Qty: 30 CAPSULE | Refills: 0 | Status: SHIPPED | OUTPATIENT
Start: 2020-08-11 | End: 2020-08-21

## 2020-08-11 ASSESSMENT — PAIN SCALES - GENERAL: PAINLEVEL: MILD PAIN (3)

## 2020-08-11 ASSESSMENT — MIFFLIN-ST. JEOR: SCORE: 2638.01

## 2020-08-11 NOTE — PROGRESS NOTES
Subjective     Armando Domingo is a 40 year old male who presents to clinic today for the following health issues:    HPI       Lump on neck      Duration: few weeks, noticed more painful and swollen last night    Description (location/character/radiation): pain to touch    Intensity:  moderate    Accompanying signs and symptoms: swollen, painful with neck movement, turning     History (similar episodes/previous evaluation): None    Precipitating or alleviating factors: None    Therapies tried and outcome: None     Noticed felt like small bump and last night seemed to get bigger.  More painful and swollen since last night.   Kept up most of night.  didin't take anything for pain  History of Pilonidal cyst -20 + years ago     Patient Active Problem List   Diagnosis     Hypertension goal BP (blood pressure) < 140/90     Hyperlipidemia LDL goal <130     Hepatic steatosis     Alcohol dependence, continuous (H)     AMADEO (obstructive sleep apnea) Severe     Restless leg     Prediabetes     Microalbuminuria     Morbid obesity with BMI of 45.0-49.9, adult (H)     Past Surgical History:   Procedure Laterality Date     SOFT TISSUE SURGERY      cyst removal       Social History     Tobacco Use     Smoking status: Former Smoker     Packs/day: 0.10     Years: 15.00     Pack years: 1.50     Types: Cigarettes     Start date: 1996     Last attempt to quit: 2013     Years since quittin.6     Smokeless tobacco: Never Used   Substance Use Topics     Alcohol use: Yes     Alcohol/week: 40.0 standard drinks     Types: 40 Standard drinks or equivalent per week     Comment: advised cutting down 7/3/17     Family History   Problem Relation Age of Onset     Diabetes Mother      Hypertension Mother      Cancer - colorectal Maternal Grandfather      Diabetes Paternal Grandmother      Cerebrovascular Disease Paternal Grandmother      C.A.D. Father      Snoring Father      Asthma No family hx of      Breast Cancer No family hx of       "Prostate Cancer No family hx of      Thyroid Disease No family hx of      Lipids No family hx of          Current Outpatient Medications   Medication Sig Dispense Refill     aspirin 81 MG tablet Take 1 tablet by mouth daily. 30 tablet 3            hydrochlorothiazide (HYDRODIURIL) 25 MG tablet Take 1 tablet (25 mg) by mouth daily 90 tablet 1     lisinopril (ZESTRIL) 40 MG tablet Take 1 tablet (40 mg) by mouth daily 90 tablet 1     rOPINIRole (REQUIP) 0.5 MG tablet Take 1 tablet (0.5 mg) by mouth At Bedtime 90 tablet 1     BP Readings from Last 3 Encounters:   08/11/20 112/82   03/04/20 121/80   02/12/20 119/73    Wt Readings from Last 3 Encounters:   08/11/20 (!) 169.2 kg (373 lb)   03/04/20 (!) 166.9 kg (367 lb 14.4 oz)   02/12/20 (!) 164.2 kg (362 lb)                    Reviewed and updated as needed this visit by Provider  Tobacco  Allergies  Meds  Problems  Med Hx  Surg Hx  Fam Hx  Soc Hx          Review of Systems   Constitutional, HEENT, cardiovascular, pulmonary, gi and gu systems are negative, except as otherwise noted.      Objective    /82 (BP Location: Left arm, Patient Position: Sitting, Cuff Size: Adult Large)   Pulse 82   Temp 97.8  F (36.6  C) (Oral)   Resp 20   Ht 1.81 m (5' 11.25\")   Wt (!) 169.2 kg (373 lb)   SpO2 97%   BMI 51.66 kg/m    Body mass index is 51.66 kg/m .  Physical Exam   GENERAL: alert, no distress and obese  HENT: ear canals and TM's normal, nose and mouth without ulcers or lesions  NECK: no adenopathy, no asymmetry, masses, or scars, thyroid normal to palpation and right posterior neck with approximately 1 centimeter diameter area of erythema and tenderness to palpation.  No area of fluctuance.  No pustule.  Slightly tender to palpation  RESP: lungs clear to auscultation - no rales, rhonchi or wheezes  CV: regular rate and rhythm, normal S1 S2, no S3 or S4, no murmur, click or rub, no peripheral edema and peripheral pulses strong  MS: no gross musculoskeletal " defects noted, no edema    Diagnostic Test Results:  none         Assessment & Plan     1. Infected sebaceous cyst  I don't think would benefit from I and D at this time.  Will treat with keflex and refer to surgery for possible excision  Warning signs and symptoms warranting urgent evaluation reviewed.   - cephALEXin (KEFLEX) 500 MG capsule; Take 1 capsule (500 mg) by mouth 3 times daily for 10 days  Dispense: 30 capsule; Refill: 0  - GENERAL SURG ADULT REFERRAL; Future       Patient Instructions     Take 3 over the counter ibuprofen four times a day with food as needed for pain.  Take keflex 500 mg three times a day for 10 days  Follow up with surgery if no improvement in 10 days Return urgently if any change in symptoms like increasing swelling, increasing swelling, fever or other change in symptoms     At [facility], we strive to deliver an exceptional experience to you, every time we see you. If you receive a survey, please complete it as we do value your feedback.  If you have MyChart, you can expect to receive results automatically within 24 hours of their completion.  Your provider will send a note interpreting your results as well.   If you do not have MyChart, you should receive your results in about a week by mail.    Your care team:                            Family Medicine Internal Medicine   MD Arun Ramsey MD Shantel Branch-Fleming, MD Srinivasa Vaka, MD Katya Georgiev PA-C Megan Hill, APRN CNP    Brenden Tucker MD Pediatrics   Bill Curtis PASARAH Ramos, MD Juanita Rodas APRN CNP   MD Karolina Regalado MD Deborah Mielke, MD Kim Thein, APRN CNP  Anabella Reyes PAAdalidC  Meghann Santamaria, CNP  MD Mary Grace Pemberton MD Angela Wermerskirchen, MD      Clinic hours: Monday - Thursday 7 am-7 pm; Fridays 7 am-5 pm.   Urgent care: Monday - Friday 11 am-9 pm; Saturday and  Sunday 9 am-5 pm.    Clinic: [phone]       [facility]: Monday - Thursday 8 am - 7 pm; Friday 8 am - 6 pm  [facility]: [phone]     Use www.[email or web address] for 24/7 diagnosis and treatment of dozens of conditions.        Return in about 10 days (around 8/21/2020), or if symptoms worsen or fail to improve.    Anabella Mancia PA-C  [facility]

## 2020-08-11 NOTE — PATIENT INSTRUCTIONS
Take 3 over the counter ibuprofen four times a day with food as needed for pain.  Take keflex 500 mg three times a day for 10 days  Follow up with surgery if no improvement in 10 days Return urgently if any change in symptoms like increasing swelling, increasing swelling, fever or other change in symptoms     At [facility], we strive to deliver an exceptional experience to you, every time we see you. If you receive a survey, please complete it as we do value your feedback.  If you have MyChart, you can expect to receive results automatically within 24 hours of their completion.  Your provider will send a note interpreting your results as well.   If you do not have MyChart, you should receive your results in about a week by mail.    Your care team:                            Family Medicine Internal Medicine   MD Arun Ramsey MD Shantel Branch-Fleming, MD Trina Álvarez PA-C, APRN CNP    Brenden Tucker, MD Pediatrics   Bill Curtis, PAAdalidC  Nettie Ramos, CNP MD Juanita Wilson APRN CNP   MD Karolina Regalado MD Deborah Mielke, MD Ciara Hernandez, APRN CNP  Anabella Reyes, PASARAH Santamaria, CNP  MD Mary Grace Pemberton MD Angela Wermerskirchen, MD      Clinic hours: Monday - Thursday 7 am-7 pm; Fridays 7 am-5 pm.   Urgent care: Monday - Friday 11 am-9 pm; Saturday and Sunday 9 am-5 pm.    Clinic: [phone]       [facility]: Monday - Thursday 8 am - 7 pm; Friday 8 am - 6 pm  [facility]: [phone]     Use [email or web address] for 24/7 diagnosis and treatment of dozens of conditions.

## 2020-09-04 DIAGNOSIS — I10 HYPERTENSION GOAL BP (BLOOD PRESSURE) < 140/90: ICD-10-CM

## 2020-09-04 DIAGNOSIS — K76.0 HEPATIC STEATOSIS: ICD-10-CM

## 2020-09-04 DIAGNOSIS — E66.01 MORBID OBESITY WITH BMI OF 45.0-49.9, ADULT (H): Primary | ICD-10-CM

## 2020-09-04 DIAGNOSIS — R73.03 PREDIABETES: ICD-10-CM

## 2020-09-04 DIAGNOSIS — R80.9 MICROALBUMINURIA: ICD-10-CM

## 2020-09-04 DIAGNOSIS — E78.5 HYPERLIPIDEMIA LDL GOAL <130: ICD-10-CM

## 2020-09-15 ENCOUNTER — OFFICE VISIT (OUTPATIENT)
Dept: PEDIATRICS | Facility: CLINIC | Age: 40
End: 2020-09-15
Payer: COMMERCIAL

## 2020-09-15 VITALS
DIASTOLIC BLOOD PRESSURE: 79 MMHG | HEIGHT: 71 IN | WEIGHT: 315 LBS | TEMPERATURE: 97.7 F | OXYGEN SATURATION: 96 % | SYSTOLIC BLOOD PRESSURE: 114 MMHG | BODY MASS INDEX: 44.1 KG/M2 | HEART RATE: 74 BPM

## 2020-09-15 DIAGNOSIS — I10 HYPERTENSION GOAL BP (BLOOD PRESSURE) < 140/90: ICD-10-CM

## 2020-09-15 DIAGNOSIS — E66.01 MORBID OBESITY WITH BMI OF 45.0-49.9, ADULT (H): ICD-10-CM

## 2020-09-15 DIAGNOSIS — E78.5 HYPERLIPIDEMIA LDL GOAL <130: ICD-10-CM

## 2020-09-15 DIAGNOSIS — G47.33 OSA (OBSTRUCTIVE SLEEP APNEA): ICD-10-CM

## 2020-09-15 DIAGNOSIS — R79.89 ELEVATED SERUM CREATININE: ICD-10-CM

## 2020-09-15 DIAGNOSIS — Z23 NEED FOR PROPHYLACTIC VACCINATION AND INOCULATION AGAINST INFLUENZA: ICD-10-CM

## 2020-09-15 DIAGNOSIS — R73.03 PREDIABETES: ICD-10-CM

## 2020-09-15 DIAGNOSIS — R55 VASOVAGAL SYNCOPE: ICD-10-CM

## 2020-09-15 DIAGNOSIS — G25.81 RESTLESS LEG: ICD-10-CM

## 2020-09-15 DIAGNOSIS — R73.03 PREDIABETES: Primary | ICD-10-CM

## 2020-09-15 DIAGNOSIS — R80.9 MICROALBUMINURIA: ICD-10-CM

## 2020-09-15 LAB
ANION GAP SERPL CALCULATED.3IONS-SCNC: 5 MMOL/L (ref 3–14)
BUN SERPL-MCNC: 19 MG/DL (ref 7–30)
CALCIUM SERPL-MCNC: 8.8 MG/DL (ref 8.5–10.1)
CHLORIDE SERPL-SCNC: 102 MMOL/L (ref 94–109)
CHOLEST SERPL-MCNC: 148 MG/DL
CO2 SERPL-SCNC: 26 MMOL/L (ref 20–32)
CREAT SERPL-MCNC: 1.27 MG/DL (ref 0.66–1.25)
CREAT UR-MCNC: 107 MG/DL
GFR SERPL CREATININE-BSD FRML MDRD: 71 ML/MIN/{1.73_M2}
GLUCOSE SERPL-MCNC: 110 MG/DL (ref 70–99)
HBA1C MFR BLD: 6 % (ref 0–5.6)
HDLC SERPL-MCNC: 44 MG/DL
LDLC SERPL CALC-MCNC: 81 MG/DL
MICROALBUMIN UR-MCNC: 23 MG/L
MICROALBUMIN/CREAT UR: 21.78 MG/G CR (ref 0–17)
NONHDLC SERPL-MCNC: 104 MG/DL
POTASSIUM SERPL-SCNC: 4.8 MMOL/L (ref 3.4–5.3)
SODIUM SERPL-SCNC: 133 MMOL/L (ref 133–144)
TRIGL SERPL-MCNC: 117 MG/DL

## 2020-09-15 PROCEDURE — 90471 IMMUNIZATION ADMIN: CPT | Performed by: FAMILY MEDICINE

## 2020-09-15 PROCEDURE — 82043 UR ALBUMIN QUANTITATIVE: CPT | Performed by: FAMILY MEDICINE

## 2020-09-15 PROCEDURE — 99214 OFFICE O/P EST MOD 30 MIN: CPT | Mod: 25 | Performed by: FAMILY MEDICINE

## 2020-09-15 PROCEDURE — 80048 BASIC METABOLIC PNL TOTAL CA: CPT | Performed by: FAMILY MEDICINE

## 2020-09-15 PROCEDURE — 80061 LIPID PANEL: CPT | Performed by: FAMILY MEDICINE

## 2020-09-15 PROCEDURE — 36415 COLL VENOUS BLD VENIPUNCTURE: CPT | Performed by: FAMILY MEDICINE

## 2020-09-15 PROCEDURE — 83036 HEMOGLOBIN GLYCOSYLATED A1C: CPT | Performed by: FAMILY MEDICINE

## 2020-09-15 PROCEDURE — 90686 IIV4 VACC NO PRSV 0.5 ML IM: CPT | Performed by: FAMILY MEDICINE

## 2020-09-15 RX ORDER — HYDROCHLOROTHIAZIDE 25 MG/1
25 TABLET ORAL DAILY
Qty: 90 TABLET | Refills: 1 | Status: SHIPPED | OUTPATIENT
Start: 2020-09-15 | End: 2021-03-18

## 2020-09-15 RX ORDER — LISINOPRIL 40 MG/1
40 TABLET ORAL DAILY
Qty: 90 TABLET | Refills: 1 | Status: SHIPPED | OUTPATIENT
Start: 2020-09-15 | End: 2021-03-18

## 2020-09-15 RX ORDER — ROPINIROLE 0.5 MG/1
0.5 TABLET, FILM COATED ORAL AT BEDTIME
Qty: 90 TABLET | Refills: 1 | Status: SHIPPED | OUTPATIENT
Start: 2020-09-15 | End: 2021-01-14

## 2020-09-15 ASSESSMENT — MIFFLIN-ST. JEOR: SCORE: 2623.95

## 2020-09-15 NOTE — PROGRESS NOTES
Subjective     Armando Domingo is a 40 year old male who presents to clinic today for the following health issues:    HPI       Hyperlipidemia Follow-Up      Are you regularly taking any medication or supplement to lower your cholesterol?   No    Are you having muscle aches or other side effects that you think could be caused by your cholesterol lowering medication?  N/A    Hypertension Follow-up      Do you check your blood pressure regularly outside of the clinic? No     Are you following a low salt diet? No    Are your blood pressures ever more than 140 on the top number (systolic) OR more   than 90 on the bottom number (diastolic), for example 140/90?  N/A      How many servings of fruits and vegetables do you eat daily?  0-1    On average, how many sweetened beverages do you drink each day (Examples: soda, juice, sweet tea, etc.  Do NOT count diet or artificially sweetened beverages)?   2    How many days per week do you exercise enough to make your heart beat faster? 3 or less    How many minutes a day do you exercise enough to make your heart beat faster? 10 - 19    How many days per week do you miss taking your medication? 0    PREDIABETES- Deneis polyuria, polydipsia, and polyphagia.  AMADEO-patient reported his wife complaining about his extremely loud snoring and multiple apneic episodes during sleep  Patient was diagnosed with sleep apnea 2 years ago, was given CPAP to use the patient found it very difficult to use and stopped using it but did not follow-up,The sleep physician to discuss further options  Denies daytime drowsiness      Review of Systems   CONSTITUTIONAL: NEGATIVE for fever, chills, change in weight  INTEGUMENTARY/SKIN: NEGATIVE for worrisome rashes, moles or lesions  EYES: NEGATIVE for vision changes or irritation  RESP: NEGATIVE for significant cough or SOB  CV: NEGATIVE for chest pain, palpitations or peripheral edema  CV: Hx HTN  GI: NEGATIVE for nausea, abdominal pain, heartburn, or  "change in bowel habits  MUSCULOSKELETAL: NEGATIVE for significant arthralgias or myalgia and history of restless leg syndrome  NEURO: NEGATIVE for weakness, dizziness or paresthesias  ENDOCRINE: NEGATIVE for temperature intolerance, skin/hair changes and history of prediabetes  HEME/ALLERGY/IMMUNE: NEGATIVE for bleeding problems  PSYCHIATRIC: NEGATIVE for changes in mood or affect      Objective    /79   Pulse 74   Temp 97.7  F (36.5  C) (Temporal)   Ht 1.81 m (5' 11.25\")   Wt (!) 167.8 kg (369 lb 14.4 oz)   SpO2 96%   BMI 51.23 kg/m    Body mass index is 51.23 kg/m .  Physical Exam   GENERAL: healthy, alert, no distress and morbidly obese  RESP: lungs clear to auscultation - no rales, rhonchi or wheezes  CV: regular rate and rhythm, normal S1 S2, no S3 or S4, no murmur, click or rub, no peripheral edema and peripheral pulses strong  MS: no gross musculoskeletal defects noted, no edema  SKIN: no suspicious lesions or rashes  NEURO: Normal strength and tone, mentation intact and speech normal  PSYCH: mentation appears normal, affect normal/bright    Results for orders placed or performed in visit on 09/15/20 (from the past 24 hour(s))   **Basic metabolic panel FUTURE anytime   Result Value Ref Range    Sodium 133 133 - 144 mmol/L    Potassium 4.8 3.4 - 5.3 mmol/L    Chloride 102 94 - 109 mmol/L    Carbon Dioxide 26 20 - 32 mmol/L    Anion Gap 5 3 - 14 mmol/L    Glucose 110 (H) 70 - 99 mg/dL    Urea Nitrogen 19 7 - 30 mg/dL    Creatinine 1.27 (H) 0.66 - 1.25 mg/dL    GFR Estimate 71 >60 mL/min/[1.73_m2]    GFR Estimate If Black 82 >60 mL/min/[1.73_m2]    Calcium 8.8 8.5 - 10.1 mg/dL   **A1C FUTURE anytime   Result Value Ref Range    Hemoglobin A1C 6.0 (H) 0 - 5.6 %   Lipid panel reflex to direct LDL Fasting   Result Value Ref Range    Cholesterol 148 <200 mg/dL    Triglycerides 117 <150 mg/dL    HDL Cholesterol 44 >39 mg/dL    LDL Cholesterol Calculated 81 <100 mg/dL    Non HDL Cholesterol 104 <130 mg/dL "           Assessment & Plan     Prediabetes  Lab Results   Component Value Date    A1C 6.0 09/15/2020    A1C 5.9 03/04/2020    A1C 5.9 08/20/2019     Glucose   Date Value Ref Range Status   09/15/2020 110 (H) 70 - 99 mg/dL Final     Comment:     Fasting specimen   03/04/2020 79 70 - 99 mg/dL Final     Comment:     Non Fasting   08/20/2019 118 (H) 70 - 99 mg/dL Final     Comment:     Fasting specimen   07/03/2017 105 (H) 70 - 99 mg/dL Final     Comment:     Fasting specimen   05/09/2016 102 (H) 70 - 99 mg/dL Final     Reviewed elevated A1c and fasting blood sugar consistent with prediabetes  Emphasized on weight loss, portion control, low calorie and low fat diet, healthy eating, regular exercises.    - **Basic metabolic panel FUTURE 6mo; Future  - **A1C FUTURE 6mo; Future    Hypertension goal BP (blood pressure) < 140/90  BP Readings from Last 6 Encounters:   09/15/20 114/79   08/11/20 112/82   03/04/20 121/80   02/12/20 119/73   11/08/19 115/76   11/05/19 102/62     Blood pressure is at goal  Will follow low salt diet, weight loss and regular exercises.  Continue with current medications, follow for recheck in 6 months.  - hydrochlorothiazide (HYDRODIURIL) 25 MG tablet; Take 1 tablet (25 mg) by mouth daily  - lisinopril (ZESTRIL) 40 MG tablet; Take 1 tablet (40 mg) by mouth daily    Restless leg  Stable, continue current medication  - rOPINIRole (REQUIP) 0.5 MG tablet; Take 1 tablet (0.5 mg) by mouth At Bedtime    AMADEO (obstructive sleep apnea)  Emphasized on following up with a sleep physician to discuss about alternate options of managing severe sleep apnea  Patient was not able to use a CPAP due to finding it cumbersome to use  Emphasized on weight loss, portion control, low calorie and low fat diet, healthy eating, regular exercises.      Hyperlipidemia LDL goal <130  LDL Cholesterol Calculated   Date Value Ref Range Status   09/15/2020 81 <100 mg/dL Final     Comment:     Desirable:       <100 mg/dl     LDL is  at goal, appreciated patient's efforts on healthy eating  Continue to monitor    Morbid obesity with BMI of 45.0-49.9, adult (H)  Wt Readings from Last 5 Encounters:   09/15/20 (!) 167.8 kg (369 lb 14.4 oz)   08/11/20 (!) 169.2 kg (373 lb)   03/04/20 (!) 166.9 kg (367 lb 14.4 oz)   02/12/20 (!) 164.2 kg (362 lb)   11/08/19 (!) 165.1 kg (364 lb)       Emphasized on weight loss, portion control, low calorie and low fat diet, healthy eating, regular exercises. Offered dietary consult, declined. Encouraged to enroll in a weight loss program for tracking on meal planning and weight.  Patient drinks 2 cans of pop every day  Drinks 2-3 times a week, 4-5 alcoholic drinks when he drinks  Emphasized on limiting alcohol to not more than 1 drink and stopping the pop and soda  Reviewed portion control, heart healthy diet, regular exercises, follow-up for recheck in 6 months or sooner if needed  Emphasized on correcting underlying sleep apnea    Microalbuminuria  Will f/u on results and call with recommendations.    - lisinopril (ZESTRIL) 40 MG tablet; Take 1 tablet (40 mg) by mouth daily    Elevated serum creatinine  Last Comprehensive Metabolic Panel:  Sodium   Date Value Ref Range Status   09/15/2020 133 133 - 144 mmol/L Final     Potassium   Date Value Ref Range Status   09/15/2020 4.8 3.4 - 5.3 mmol/L Final     Chloride   Date Value Ref Range Status   09/15/2020 102 94 - 109 mmol/L Final     Carbon Dioxide   Date Value Ref Range Status   09/15/2020 26 20 - 32 mmol/L Final     Anion Gap   Date Value Ref Range Status   09/15/2020 5 3 - 14 mmol/L Final     Glucose   Date Value Ref Range Status   09/15/2020 110 (H) 70 - 99 mg/dL Final     Comment:     Fasting specimen     Urea Nitrogen   Date Value Ref Range Status   09/15/2020 19 7 - 30 mg/dL Final     Creatinine   Date Value Ref Range Status   09/15/2020 1.27 (H) 0.66 - 1.25 mg/dL Final     GFR Estimate   Date Value Ref Range Status   09/15/2020 71 >60 mL/min/[1.73_m2] Final  "    Comment:     Non  GFR Calc  Starting 12/18/2018, serum creatinine based estimated GFR (eGFR) will be   calculated using the Chronic Kidney Disease Epidemiology Collaboration   (CKD-EPI) equation.       Calcium   Date Value Ref Range Status   09/15/2020 8.8 8.5 - 10.1 mg/dL Final     Noted slightly elevated serum creatinine with normal blood urea nitrogen and GFR  Is likely be from his prolonged fasting yesterday for his fasting labs this morning  Recommended to have a repeat check at his next visit when he is not fasting, emphasized on good hydration, avoid NSAID use.  Patient verbalised understanding and is agreeable to the plan.       Vasovagal syncope  Comment:   Plan: Patient had a vasovagal episode this morning while he was getting his fasting labs drawn  Had previous similar episodes few years ago  He felt fine after eating a glass of juice and some crackers  His  vitals were stable, continue to monitor      BMI:   Estimated body mass index is 51.66 kg/m  as calculated from the following:    Height as of 8/11/20: 1.81 m (5' 11.25\").    Weight as of 8/11/20: 169.2 kg (373 lb).   Weight management plan: Discussed healthy diet and exercise guidelines        Work on weight loss  Regular exercise  Chart documentation done in part with Dragon Voice recognition Software. Although reviewed after completion, some word and grammatical error may remain.    See Patient Instructions    No follow-ups on file.    Yogesh Zaldivar MD  [facility]    "

## 2020-09-15 NOTE — PATIENT INSTRUCTIONS
Get the flu shot today  Schedule for physical, non fasting labs in 6 months  Call to schedule for sleep physician follow up  Get your weight, temperature checked today  Try to stop drinking pop  Try to avoid alcohol or limit to not more than one drink per day

## 2021-01-14 ENCOUNTER — OFFICE VISIT (OUTPATIENT)
Dept: FAMILY MEDICINE | Facility: CLINIC | Age: 41
End: 2021-01-14
Payer: COMMERCIAL

## 2021-01-14 VITALS
OXYGEN SATURATION: 95 % | SYSTOLIC BLOOD PRESSURE: 122 MMHG | HEART RATE: 96 BPM | BODY MASS INDEX: 44.1 KG/M2 | WEIGHT: 315 LBS | DIASTOLIC BLOOD PRESSURE: 78 MMHG | TEMPERATURE: 97.1 F | HEIGHT: 71 IN

## 2021-01-14 DIAGNOSIS — I10 HYPERTENSION GOAL BP (BLOOD PRESSURE) < 140/90: ICD-10-CM

## 2021-01-14 DIAGNOSIS — E78.5 HYPERLIPIDEMIA LDL GOAL <130: ICD-10-CM

## 2021-01-14 DIAGNOSIS — E66.01 MORBID OBESITY WITH BMI OF 45.0-49.9, ADULT (H): Primary | ICD-10-CM

## 2021-01-14 DIAGNOSIS — R73.03 PREDIABETES: ICD-10-CM

## 2021-01-14 DIAGNOSIS — F10.20 ALCOHOL DEPENDENCE, CONTINUOUS (H): ICD-10-CM

## 2021-01-14 PROCEDURE — 99214 OFFICE O/P EST MOD 30 MIN: CPT | Performed by: NURSE PRACTITIONER

## 2021-01-14 ASSESSMENT — MIFFLIN-ST. JEOR: SCORE: 2628.93

## 2021-01-14 NOTE — PROGRESS NOTES
Assessment & Plan     Morbid obesity with BMI of 45.0-49.9, adult (H)  Referral placed today.   Discussed lifestyle measures- cutting out alcohol and soda.  Avoiding simple carbs.  Lean meats, dairy, fruits, veggies.  Clear out pantry of snacks like cookies, crackers.    He plans to buy exercise bike, aim for at least 30 minutes daily but may need to start with less, depending on how he tolerates it.   - COMPREHENSIVE WEIGHT MANAGEMENT    Alcohol dependence, continuous (H)  He hasn't drank in the last two days.  Declined any assistance, plans to work on it himself.   Denies any history of etoh withdrawal and currently feels fine, we discussed symptoms.     Prediabetes  A1C 6.0.   Would benefit from weight loss.     Hypertension goal BP (blood pressure) < 140/90  Well controlled on current medication, no changes today.     Hyperlipidemia LDL goal <130  Chronic, stable.  Recent lipid panel actually looks pretty good.  Not on medication.   Would benefit from weight loss.        See Patient Instructions  Patient Instructions   Referral to comprehensive weight management.  I think this would be a great place to start.  Let me know if there are any issues.     For now, starting to exercise would be great.   Exercise bike is a good idea- easier on the joints when you are first starting.  Aim for 30 minutes of dedicated exercise per day.     Diet:   You will cut out a lot of calories by cutting soda and alcohol.     Meats- focus on lean meats such as chicken, turkey, fish.   Dairy- low fat cottage cheese or sting cheese.   Fresh vegetable and fruit.   Eat more veggies than fruit, fruit can have a lot of sugar.   Do not add butter/margarine to veggies or dressings high in fat (ranch, etc).   Cut out simple carbs such as cookies, crackers, pasta.    If you are going to have carbs, this should be in moderation, and should be whole grain.                 Return in about 3 months (around 4/14/2021) for Follow-up.    Daysi  "FRANCIA Blank CNP  [facility] TEO Roberts is a 40 year old who with a PMH significant for hypertension, prediabetes, AMADEO, alcohol dependence and morbid obesity who presents to clinic today for the following health issues:       HPI       Patient would like to lose weight.   Looking for options, not sure where to start.    About 10 years ago, was closer to 230 lbs.  Had lost weight by watching diet and exercising.  Currently weight is 371.  Working at home, desk job.    States biggest problem is with portion control.  Also drinks a lot of soda and alcohol.   Admits to about 7-8 shots of liquor per night.  This is with soda, about 3-5 cans, usually regular Coke or Pepsi.    He has been trying to quit drinking.  Hasn't had any alcohol now for 2 days.  Denies any withdrawal symptoms or past history of withdrawal.  Declines assistance with etoh cessation.   Not interested in surgery.  Wants to work on lifestyle measures.  Would be open to referral to comprehensive weight management.   Thinking about buying an exercise bike for the home.  Wife would like to lose weight too.      Patient had appt with labs in September 2020- stable at that time.   No smoking.   BP well controlled on lisinopril 40 mg and hydrochlorothiazide 25 mg.    Normal BMP, some microalbuminuria.   A1C is 6.0, has been prediabetic dating back to 8/2019 in our system.     Lipid panel looks pretty good, not on medication.       Review of Systems   Constitutional, HEENT, cardiovascular, pulmonary, GI, , musculoskeletal, neuro, skin, endocrine and psych systems are negative, except as otherwise noted.      Objective    /78   Pulse 96   Temp 97.1  F (36.2  C)   Ht 1.81 m (5' 11.25\")   Wt (!) 168.3 kg (371 lb)   SpO2 95%   BMI 51.38 kg/m    Body mass index is 51.38 kg/m .  Physical Exam   GENERAL: healthy, alert and no distress.  Obese habitus.   EYES: Eyes grossly normal to inspection, PERRL and conjunctivae and " sclerae normal  HENT: ear canals and TM's normal, nose and mouth without ulcers or lesions  NECK: no adenopathy, no asymmetry, masses, or scars and thyroid normal to palpation  RESP: lungs clear to auscultation - no rales, rhonchi or wheezes  CV: regular rate and rhythm, normal S1 S2, no S3 or S4, no murmur, click or rub, no peripheral edema and peripheral pulses strong  MS: no gross musculoskeletal defects noted, no edema  SKIN: no suspicious lesions or rashes  NEURO: Normal strength and tone, mentation intact and speech normal  PSYCH: mentation appears normal, affect normal/bright    Recent Labs   Lab Test 09/15/20  0805 03/04/20  1709 08/20/19  0707     --  138   POTASSIUM 4.8  --  4.3   CHLORIDE 102  --  103   CO2 26  --  30   ANIONGAP 5  --  5   * 79 118*   BUN 19  --  17   CR 1.27*  --  1.18   BROOK 8.8  --  8.6     Lab Results   Component Value Date    A1C 6.0 09/15/2020    A1C 5.9 03/04/2020    A1C 5.9 08/20/2019     Recent Labs   Lab Test 09/15/20  0805 08/20/19  0707 11/19/14  0813 11/19/14  0813 10/02/13  0715   CHOL 148 145   < > 190 174   HDL 44 35*   < > 43 37*   LDL 81 90   < > 129 120   TRIG 117 98   < > 89 87   CHOLHDLRATIO  --   --   --  4.4 4.7    < > = values in this interval not displayed.

## 2021-01-14 NOTE — PATIENT INSTRUCTIONS
Referral to comprehensive weight management.  I think this would be a great place to start.  Let me know if there are any issues.     For now, starting to exercise would be great.   Exercise bike is a good idea- easier on the joints when you are first starting.  Aim for 30 minutes of dedicated exercise per day.     Diet:   You will cut out a lot of calories by cutting soda and alcohol.     Meats- focus on lean meats such as chicken, turkey, fish.   Dairy- low fat cottage cheese or sting cheese.   Fresh vegetable and fruit.   Eat more veggies than fruit, fruit can have a lot of sugar.   Do not add butter/margarine to veggies or dressings high in fat (ranch, etc).   Cut out simple carbs such as cookies, crackers, pasta.    If you are going to have carbs, this should be in moderation, and should be whole grain.

## 2021-01-21 ENCOUNTER — OFFICE VISIT - HEALTHEAST (OUTPATIENT)
Dept: SURGERY | Facility: CLINIC | Age: 41
End: 2021-01-21

## 2021-01-21 DIAGNOSIS — Z87.442 HISTORY OF KIDNEY STONES: ICD-10-CM

## 2021-01-21 DIAGNOSIS — E66.01 MORBID OBESITY (H): ICD-10-CM

## 2021-01-21 DIAGNOSIS — I10 BENIGN ESSENTIAL HYPERTENSION: ICD-10-CM

## 2021-01-21 DIAGNOSIS — G47.33 OSA (OBSTRUCTIVE SLEEP APNEA): ICD-10-CM

## 2021-01-21 DIAGNOSIS — F10.10 ALCOHOL ABUSE, EPISODIC DRINKING BEHAVIOR: ICD-10-CM

## 2021-01-21 DIAGNOSIS — E88.810 METABOLIC SYNDROME: ICD-10-CM

## 2021-01-21 DIAGNOSIS — K76.0 HEPATIC STEATOSIS: ICD-10-CM

## 2021-01-21 ASSESSMENT — MIFFLIN-ST. JEOR: SCORE: 2636.31

## 2021-01-26 ENCOUNTER — OFFICE VISIT - HEALTHEAST (OUTPATIENT)
Dept: SURGERY | Facility: CLINIC | Age: 41
End: 2021-01-26

## 2021-01-26 DIAGNOSIS — E66.01 MORBID OBESITY WITH BMI OF 45.0-49.9, ADULT (H): ICD-10-CM

## 2021-01-26 ASSESSMENT — MIFFLIN-ST. JEOR: SCORE: 2613.63

## 2021-01-28 ENCOUNTER — AMBULATORY - HEALTHEAST (OUTPATIENT)
Dept: SURGERY | Facility: CLINIC | Age: 41
End: 2021-01-28

## 2021-01-28 ENCOUNTER — COMMUNICATION - HEALTHEAST (OUTPATIENT)
Dept: SURGERY | Facility: CLINIC | Age: 41
End: 2021-01-28

## 2021-01-28 DIAGNOSIS — E66.01 OBESITY, CLASS III, BMI 40-49.9 (MORBID OBESITY) (H): ICD-10-CM

## 2021-02-24 ENCOUNTER — OFFICE VISIT - HEALTHEAST (OUTPATIENT)
Dept: SURGERY | Facility: CLINIC | Age: 41
End: 2021-02-24

## 2021-02-24 DIAGNOSIS — G47.33 OSA (OBSTRUCTIVE SLEEP APNEA): ICD-10-CM

## 2021-02-24 DIAGNOSIS — E66.01 MORBID OBESITY WITH BMI OF 45.0-49.9, ADULT (H): ICD-10-CM

## 2021-03-09 ENCOUNTER — ANCILLARY PROCEDURE (OUTPATIENT)
Dept: GENERAL RADIOLOGY | Facility: CLINIC | Age: 41
End: 2021-03-09
Attending: NURSE PRACTITIONER
Payer: COMMERCIAL

## 2021-03-09 ENCOUNTER — OFFICE VISIT (OUTPATIENT)
Dept: URGENT CARE | Facility: URGENT CARE | Age: 41
End: 2021-03-09
Payer: COMMERCIAL

## 2021-03-09 VITALS
SYSTOLIC BLOOD PRESSURE: 92 MMHG | OXYGEN SATURATION: 100 % | DIASTOLIC BLOOD PRESSURE: 60 MMHG | TEMPERATURE: 98 F | BODY MASS INDEX: 47.09 KG/M2 | WEIGHT: 315 LBS | HEART RATE: 98 BPM | RESPIRATION RATE: 16 BRPM

## 2021-03-09 DIAGNOSIS — I95.9 HYPOTENSION, UNSPECIFIED HYPOTENSION TYPE: ICD-10-CM

## 2021-03-09 DIAGNOSIS — M62.81 GENERALIZED MUSCLE WEAKNESS: Primary | ICD-10-CM

## 2021-03-09 LAB
BASOPHILS # BLD AUTO: 0 10E9/L (ref 0–0.2)
BASOPHILS NFR BLD AUTO: 0.2 %
DIFFERENTIAL METHOD BLD: ABNORMAL
EOSINOPHIL # BLD AUTO: 0.4 10E9/L (ref 0–0.7)
EOSINOPHIL NFR BLD AUTO: 3 %
ERYTHROCYTE [DISTWIDTH] IN BLOOD BY AUTOMATED COUNT: 12.7 % (ref 10–15)
HCT VFR BLD AUTO: 45.1 % (ref 40–53)
HGB BLD-MCNC: 14.6 G/DL (ref 13.3–17.7)
LYMPHOCYTES # BLD AUTO: 2.8 10E9/L (ref 0.8–5.3)
LYMPHOCYTES NFR BLD AUTO: 20.4 %
MCH RBC QN AUTO: 27.4 PG (ref 26.5–33)
MCHC RBC AUTO-ENTMCNC: 32.4 G/DL (ref 31.5–36.5)
MCV RBC AUTO: 85 FL (ref 78–100)
MONOCYTES # BLD AUTO: 1.4 10E9/L (ref 0–1.3)
MONOCYTES NFR BLD AUTO: 10.5 %
NEUTROPHILS # BLD AUTO: 9 10E9/L (ref 1.6–8.3)
NEUTROPHILS NFR BLD AUTO: 65.9 %
PLATELET # BLD AUTO: 297 10E9/L (ref 150–450)
RBC # BLD AUTO: 5.32 10E12/L (ref 4.4–5.9)
WBC # BLD AUTO: 13.7 10E9/L (ref 4–11)

## 2021-03-09 PROCEDURE — 71046 X-RAY EXAM CHEST 2 VIEWS: CPT | Performed by: RADIOLOGY

## 2021-03-09 PROCEDURE — 93000 ELECTROCARDIOGRAM COMPLETE: CPT | Performed by: NURSE PRACTITIONER

## 2021-03-09 PROCEDURE — 85025 COMPLETE CBC W/AUTO DIFF WBC: CPT | Performed by: NURSE PRACTITIONER

## 2021-03-09 PROCEDURE — 80053 COMPREHEN METABOLIC PANEL: CPT | Performed by: NURSE PRACTITIONER

## 2021-03-09 PROCEDURE — 99214 OFFICE O/P EST MOD 30 MIN: CPT | Mod: 25 | Performed by: NURSE PRACTITIONER

## 2021-03-09 PROCEDURE — 82550 ASSAY OF CK (CPK): CPT | Performed by: FAMILY MEDICINE

## 2021-03-09 PROCEDURE — 36415 COLL VENOUS BLD VENIPUNCTURE: CPT | Performed by: NURSE PRACTITIONER

## 2021-03-09 ASSESSMENT — ENCOUNTER SYMPTOMS
CARDIOVASCULAR NEGATIVE: 1
FEVER: 0
WEIGHT LOSS: 1
DIZZINESS: 1
DIAPHORESIS: 0
PALPITATIONS: 0
RESPIRATORY NEGATIVE: 1
GASTROINTESTINAL NEGATIVE: 1
CLAUDICATION: 0
SHORTNESS OF BREATH: 0
PND: 0
EYES NEGATIVE: 1
CHILLS: 0
MUSCULOSKELETAL NEGATIVE: 1
ORTHOPNEA: 0

## 2021-03-09 NOTE — Clinical Note
Armando was seen in the [facility] with dx of hypotension. He has had a 30 lb weight loss and BP was running about 90 diastolic with some orthostatic symptoms. CBC showed a slight bump in the WBC/Neutrophils, CMP pending and normal CHEST X-RAY and EKG. He has been advised to follow up with you later this week for recheck.   Ciara Lopes, DNP, APRN, CNP

## 2021-03-10 ENCOUNTER — TELEPHONE (OUTPATIENT)
Dept: FAMILY MEDICINE | Facility: CLINIC | Age: 41
End: 2021-03-10

## 2021-03-10 ENCOUNTER — VIRTUAL VISIT (OUTPATIENT)
Dept: FAMILY MEDICINE | Facility: CLINIC | Age: 41
End: 2021-03-10
Payer: COMMERCIAL

## 2021-03-10 DIAGNOSIS — R94.4 ABNORMAL RENAL FUNCTION TEST: ICD-10-CM

## 2021-03-10 DIAGNOSIS — Z86.79 HISTORY OF HYPERTENSION: ICD-10-CM

## 2021-03-10 DIAGNOSIS — R63.4 RECENT WEIGHT LOSS: ICD-10-CM

## 2021-03-10 DIAGNOSIS — D72.829 LEUKOCYTOSIS, UNSPECIFIED TYPE: ICD-10-CM

## 2021-03-10 DIAGNOSIS — I95.89 OTHER SPECIFIED HYPOTENSION: Primary | ICD-10-CM

## 2021-03-10 DIAGNOSIS — H93.8X2 EAR PRESSURE, LEFT: ICD-10-CM

## 2021-03-10 DIAGNOSIS — R73.03 PREDIABETES: ICD-10-CM

## 2021-03-10 DIAGNOSIS — I10 HYPERTENSION GOAL BP (BLOOD PRESSURE) < 140/90: ICD-10-CM

## 2021-03-10 LAB
ALBUMIN SERPL-MCNC: 4.1 G/DL (ref 3.4–5)
ALP SERPL-CCNC: 55 U/L (ref 40–150)
ALT SERPL W P-5'-P-CCNC: 53 U/L (ref 0–70)
ANION GAP SERPL CALCULATED.3IONS-SCNC: 5 MMOL/L (ref 3–14)
AST SERPL W P-5'-P-CCNC: 24 U/L (ref 0–45)
BILIRUB SERPL-MCNC: 0.5 MG/DL (ref 0.2–1.3)
BUN SERPL-MCNC: 30 MG/DL (ref 7–30)
CALCIUM SERPL-MCNC: 9.3 MG/DL (ref 8.5–10.1)
CHLORIDE SERPL-SCNC: 101 MMOL/L (ref 94–109)
CK SERPL-CCNC: 218 U/L (ref 30–300)
CO2 SERPL-SCNC: 28 MMOL/L (ref 20–32)
CREAT SERPL-MCNC: 2.25 MG/DL (ref 0.66–1.25)
GFR SERPL CREATININE-BSD FRML MDRD: 36 ML/MIN/{1.73_M2}
GLUCOSE SERPL-MCNC: 74 MG/DL (ref 70–99)
POTASSIUM SERPL-SCNC: 4 MMOL/L (ref 3.4–5.3)
PROT SERPL-MCNC: 8.1 G/DL (ref 6.8–8.8)
SODIUM SERPL-SCNC: 134 MMOL/L (ref 133–144)

## 2021-03-10 PROCEDURE — 99214 OFFICE O/P EST MOD 30 MIN: CPT | Mod: 95 | Performed by: FAMILY MEDICINE

## 2021-03-10 NOTE — PROGRESS NOTES
HPI  Armando Domingo 40 year old presents with CHIEF COMPLAINT of weakness, dizziness.  Patient states the symptoms have been waxing and waning for approximately 1 week, but today noticed that when he changed positions that he was quite dizzy.  Patient has not taken much for oral fluids today and is on 2 blood pressure medications, hydrochlorothiazide and lisinopril.  Patient also notes that he has been on a stringent diet over the last couple of months and has reduced approximately 30 pounds in that time.  Patient denies any chest pain, shortness of breath, weakness of his extremities, or nausea/vomiting/diarrhea.    Review of Systems   Constitutional: Positive for malaise/fatigue and weight loss. Negative for chills, diaphoresis and fever.   HENT: Negative.    Eyes: Negative.    Respiratory: Negative.  Negative for shortness of breath.    Cardiovascular: Negative.  Negative for chest pain, palpitations, orthopnea, claudication, leg swelling and PND.   Gastrointestinal: Negative.    Genitourinary: Negative.    Musculoskeletal: Negative.    Skin: Negative.    Neurological: Positive for dizziness.   Endo/Heme/Allergies: Negative.      Past Medical History:   Diagnosis Date     Reflux      Patient Active Problem List   Diagnosis     Hypertension goal BP (blood pressure) < 140/90     Hyperlipidemia LDL goal <130     Hepatic steatosis     Alcohol dependence, continuous (H)     AMADEO (obstructive sleep apnea) Severe     Restless leg     Prediabetes     Microalbuminuria     Morbid obesity with BMI of 45.0-49.9, adult (H)     Past Surgical History:   Procedure Laterality Date     SOFT TISSUE SURGERY      cyst removal     No Known Allergies  Current Outpatient Medications   Medication     aspirin 81 MG tablet     hydrochlorothiazide (HYDRODIURIL) 25 MG tablet     lisinopril (ZESTRIL) 40 MG tablet     No current facility-administered medications for this visit.          Physical Exam  Vitals signs and nursing note reviewed.    Constitutional:       General: He is not in acute distress.     Comments: BP 92/60   Pulse 98   Temp 98  F (36.7  C) (Oral)   Resp 16   Wt (!) 154.2 kg (340 lb)   SpO2 100%   BMI 47.09 kg/m       HENT:      Head: Normocephalic.      Mouth/Throat:      Mouth: Mucous membranes are moist.   Eyes:      Extraocular Movements: Extraocular movements intact.      Conjunctiva/sclera: Conjunctivae normal.      Pupils: Pupils are equal, round, and reactive to light.   Cardiovascular:      Rate and Rhythm: Normal rate.      Heart sounds: Normal heart sounds.   Pulmonary:      Effort: Pulmonary effort is normal.      Breath sounds: Normal breath sounds.   Skin:     General: Skin is warm and dry.      Capillary Refill: Capillary refill takes less than 2 seconds.   Neurological:      Mental Status: He is alert and oriented to person, place, and time.      Sensory: No sensory deficit.       Results for orders placed or performed in visit on 03/09/21   XR Chest 2 Views     Status: None    Narrative    CHEST TWO VIEW   3/9/2021 7:06 PM     HISTORY: Generalized muscle weakness.    COMPARISON: Chest x-ray on 5/22/2011.      Impression    IMPRESSION: PA and lateral views of the chest were obtained.  Cardiomediastinal silhouette is within normal limits. No suspicious  focal pulmonary opacities. No significant pleural effusion or  pneumothorax.    EDMUND GONZALEZ MD   CBC with platelets and differential     Status: Abnormal   Result Value Ref Range    WBC 13.7 (H) 4.0 - 11.0 10e9/L    RBC Count 5.32 4.4 - 5.9 10e12/L    Hemoglobin 14.6 13.3 - 17.7 g/dL    Hematocrit 45.1 40.0 - 53.0 %    MCV 85 78 - 100 fl    MCH 27.4 26.5 - 33.0 pg    MCHC 32.4 31.5 - 36.5 g/dL    RDW 12.7 10.0 - 15.0 %    Platelet Count 297 150 - 450 10e9/L    % Neutrophils 65.9 %    % Lymphocytes 20.4 %    % Monocytes 10.5 %    % Eosinophils 3.0 %    % Basophils 0.2 %    Absolute Neutrophil 9.0 (H) 1.6 - 8.3 10e9/L    Absolute Lymphocytes 2.8 0.8 - 5.3  10e9/L    Absolute Monocytes 1.4 (H) 0.0 - 1.3 10e9/L    Absolute Eosinophils 0.4 0.0 - 0.7 10e9/L    Absolute Basophils 0.0 0.0 - 0.2 10e9/L    Diff Method Automated Method      CMP Pending    12 LD EKG shows sinus rhythm, rare PAC      Assessment:  1. Generalized muscle weakness    2. Hypotension, unspecified hypotension type        Plan:  Orders Placed This Encounter     XR Chest 2 Views     Comprehensive metabolic panel (BMP + Alb, Alk Phos, ALT, AST, Total. Bili, TP)     CBC with platelets and differential   Push oral fluids   HOLD YOUR BP MEDS (Lisinopril and HTCZ) until you   Have your lab results back.   Call your PCP tomorrow and explain the 30 lb weight loss and that your BP was running 88-90 systolic.   Your blood count shows a mildly elevated white blood count and   Should be rechecked by your provider in 1-2 days to see if it returns to normal.     Instructions regarding self-care of patient reviewed.   Written instructions provided in after visit summary and reviewed.  Patient instructed to see primary care provider for new or persistent symptoms.   Red flag symptoms reviewed and patient has been instructed to seek emergent care    Ciara Lopes, DNP, APRN, CNP

## 2021-03-10 NOTE — TELEPHONE ENCOUNTER
[facility] does not fill scripts for blood pressure monitors. Patient will need to buy OTC or script could be sent to a medical supply (DME) supplier.      Taina Bowden    Pharmacy Technician  [facility]

## 2021-03-10 NOTE — PROGRESS NOTES
Armando is a 40 year old who is being evaluated via a billable video visit.      How would you like to obtain your AVS? [email or web address]  If the video visit is dropped, the invitation should be resent by: Text to cell phone: see epic  Will anyone else be joining your video visit? No    Video Start Time: 11:24am    Assessment & Plan     Other specified hypotension  BP Readings from Last 6 Encounters:   03/09/21 92/60   01/14/21 122/78   09/15/20 114/79   08/11/20 112/82   03/04/20 121/80   02/12/20 119/73     Reviewed low blood pressure of 92/60 from urgent care visit yesterday.  His antihypertensives were on hold since yesterday.  Patient is feeling slightly better today  Recommended to start taking blood pressures once a day at home while he continues to hold the lisinopril and hydrochlorothiazide.  He will send a Kior message to provider for blood pressure update.  If blood pressure continues to be less than 140/90 while off of antihypertensive, will discontinue medication for now given his significant weight loss and healthy lifestyle changes, continue with good hydration.  Patient verbalised understanding and is agreeable to the plan.    - Blood Pressure Monitor KIT; Use to check blood pressure daily    History of hypertension  as above    - Blood Pressure Monitor KIT; Use to check blood pressure daily    Recent weight loss  Wt Readings from Last 5 Encounters:   03/09/21 (!) 154.2 kg (340 lb)   01/14/21 (!) 168.3 kg (371 lb)   09/15/20 (!) 167.8 kg (369 lb 14.4 oz)   08/11/20 (!) 169.2 kg (373 lb)   03/04/20 (!) 166.9 kg (367 lb 14.4 oz)     Patient had last 31 pounds in the past 2 months.  Appreciated patient's efforts on the significant weight loss.  He reported quitting alcohol altogether, made significant changes in his way of eating and exercising.  We will continue with current efforts.  Continue to monitor  - Blood Pressure Monitor KIT; Use to check blood pressure daily    Ear pressure, left  ddx-recent  hypotension versus eustachian tube dysfunction  Patient will monitor his blood pressure and the left ear symptom for the next 1 week.  If blood pressure is normal but your symptoms continue, he will give a trial of over-the-counter Claritin 10 mg once daily in the morning for 2 to 3 weeks.    Prediabetes  Expect improvement in normalized labs with his recent significant healthy weight loss.  Future labs are already ordered  He wants to have them done at the [facility], which is closer to home.  Will f/u on results and call with recommendations.    Leukocytosis, unspecified type  Lab Results   Component Value Date    WBC 13.7 03/09/2021     Lab Results   Component Value Date    RBC 5.32 03/09/2021     Lab Results   Component Value Date    HGB 14.6 03/09/2021     Lab Results   Component Value Date    HCT 45.1 03/09/2021     No components found for: MCT  Lab Results   Component Value Date    MCV 85 03/09/2021     Lab Results   Component Value Date    MCH 27.4 03/09/2021     Lab Results   Component Value Date    MCHC 32.4 03/09/2021     Lab Results   Component Value Date    RDW 12.7 03/09/2021     Lab Results   Component Value Date     03/09/2021     Reviewed slightly elevated WBC of 13.7 with mild left shift and monocytes.  Recommended to have a lab recheck at the next lab visit in 7 to 10 days to see the trend  Will f/u on results and call with recommendations.  Patient verbalised understanding and is agreeable to the plan.    abnormal renal function test-  Last Comprehensive Metabolic Panel:  Sodium   Date Value Ref Range Status   03/09/2021 134 133 - 144 mmol/L Final     Potassium   Date Value Ref Range Status   03/09/2021 4.0 3.4 - 5.3 mmol/L Final     Chloride   Date Value Ref Range Status   03/09/2021 101 94 - 109 mmol/L Final     Carbon Dioxide   Date Value Ref Range Status   03/09/2021 28 20 - 32 mmol/L Final     Anion Gap   Date Value Ref Range Status   03/09/2021 5 3 - 14 mmol/L Final  "    Glucose   Date Value Ref Range Status   03/09/2021 74 70 - 99 mg/dL Final     Comment:     Non Fasting     Urea Nitrogen   Date Value Ref Range Status   03/09/2021 30 7 - 30 mg/dL Final     Creatinine   Date Value Ref Range Status   03/09/2021 2.25 (H) 0.66 - 1.25 mg/dL Final     GFR Estimate   Date Value Ref Range Status   03/09/2021 36 (L) >60 mL/min/[1.73_m2] Final     Comment:     Non  GFR Calc  Starting 12/18/2018, serum creatinine based estimated GFR (eGFR) will be   calculated using the Chronic Kidney Disease Epidemiology Collaboration   (CKD-EPI) equation.       Calcium   Date Value Ref Range Status   03/09/2021 9.3 8.5 - 10.1 mg/dL Final     The results on CMP that was done at the urgent care was back after patient visit was done  Noted LFT-normal  Reviewed moderately elevated serum creatinine and low GFR.  Likely from dehydration in the setting of severe hypotension from his antihypertensives.  I expect this to improve now that he is on hold on his antihypertensives, can be get a lab recheck in 7 to 10 days    Review of the result(s) of each unique test - CBC, CMP  31 minutes spent on the date of the encounter doing chart review, review of test results, interpretation of tests, patient visit and documentation        BMI:   Estimated body mass index is 47.09 kg/m  as calculated from the following:    Height as of 1/14/21: 1.81 m (5' 11.25\").    Weight as of 3/9/21: 154.2 kg (340 lb).   Weight management plan: Patient referred to endocrine and/or weight management specialty Lost 31 pounds over the past 2 months    Chart documentation done in part with Dragon Voice recognition Software. Although reviewed after completion, some word and grammatical error may remain.    See Patient Instructions    No follow-ups on file.    Yogesh Zaldivar MD  [facility]    Guillermo Roberts is a 40 year old who presents for the following health issues     HPI   Patient is here " for a telephone visit instead of in person visit due to the current COVID-19 pandemic.  Patient's mental history significant for hypertension he is here for a video visit for the follow-up on his urgent care visit yesterday where he went with concerns for weakness, dizziness that was going on for the past 1 week.  Patient noticed significant dizziness when he was changing positions yesterday.  He has been on a stringent diet over the last couple months as he is approximately 1 pounds in less than 2 months.  He is currently taking lisinopril and hydrochlorothiazide for his hypertension.  Patient denies having chest pain, shortness of breath, palpitations, history of fall yesterday or currently.  He reported having pressure sensation in the left ear for the past week, with no associated symptoms for runny nose, sore throat, fever, chills, right-sided symptoms, please similar symptoms.  Denies ear pain    Hypertension Follow-up      Do you check your blood pressure regularly outside of the clinic? No     Are you following a low salt diet? Yes    Are your blood pressures ever more than 140 on the top number (systolic) OR more   than 90 on the bottom number (diastolic), for example 140/90? N/a, had a low blood pressure of 92/60 at the urgent care yesterday          Review of Systems   CONSTITUTIONAL: NEGATIVE for fever, chills, change in weight  INTEGUMENTARY/SKIN: NEGATIVE for worrisome rashes, moles or lesions  EYES: NEGATIVE for vision changes or irritation  ENT/MOUTH: NEGATIVE for ear, mouth and throat problems  RESP: NEGATIVE for significant cough or SOB  CV: NEGATIVE for chest pain, palpitations or peripheral edema  History of hypertension  GI: NEGATIVE for nausea, abdominal pain, heartburn, or change in bowel habits  MUSCULOSKELETAL: NEGATIVE for significant arthralgias or myalgia  NEURO: as above  ENDOCRINE: NEGATIVE for temperature intolerance, skin/hair changes  History of prediabetes  HEME/ALLERGY/IMMUNE:  NEGATIVE for bleeding problems  PSYCHIATRIC: NEGATIVE for changes in mood or affect      Objective           Vitals:  No vitals were obtained today due to virtual visit.    Physical Exam   GENERAL: Healthy, alert and no distress  EYES: Eyes grossly normal to inspection  RESP: No audible wheeze, cough, or visible cyanosis.  No visible retractions or increased work of breathing.    SKIN: Visible skin clear  NEURO: Cranial nerves grossly intact.  Mentation and speech appropriate for age.  PSYCH: Mentation appears normal, affect normal/bright, judgement and insight intact, normal speech and appearance well-groomed.              Video-Visit Details    Type of service:  Video Visit    Video End Time:11:34 AM    Originating Location (pt. Location): Home    Distant Location (provider location):  [facility]     Platform used for Video Visit: Drop Messages

## 2021-03-10 NOTE — TELEPHONE ENCOUNTER
DME RX faxed to [facility] , [place],   Fax: [phone]    COURTNEY Vines.

## 2021-03-10 NOTE — PATIENT INSTRUCTIONS
Start taking blood pressure once a day at home and send a [employer] message in 10 to 14 days for update to provider.  If the left ear pressure does not improve in 1 week after holding blood pressure medication, please give a trial of over-the-counter Claritin 10 mg once daily in the morning for 2 to 3 weeks  Schedule for nonfasting labs in 1 to 2 weeks

## 2021-03-10 NOTE — TELEPHONE ENCOUNTER
Noted response from [facility]   DME done, please fax it to [facility] , [place] and update patient

## 2021-03-10 NOTE — PATIENT INSTRUCTIONS
Patient Education     Discharge Instructions for Low Blood Pressure (Hypotension)   You have been diagnosed with low blood pressure (hypotension). When you have hypotension, your blood pressure is lower than normal. Low blood pressure can make you feel dizzy or faint. This condition is sometimes a side effect of taking certain medicines, including medicines for high blood pressure (hypertension). It can also result from medical conditions such as dehydration. Hypotension has many possible causes. Sometimes the cause is unknown, and you will need follow-up visits and tests.   Home care  These steps can help manage your condition:     Follow your healthcare provider s instructions. Go to all your follow up appointments.    Rest in bed and ask for help with daily activities until you feel better. You may need to slowly increase the amount of time you spend sitting or doing light activity.    Don t drive while your blood pressure is not controlled.    Be careful when you get up from sitting or lying down.  ? Take your time. Sudden movements can cause dizziness or fainting.  ? When you first sit up after lying down, be sure to sit in bed for 30 seconds or so before getting up to walk.    Tell your healthcare provider about the medicines you are taking. Many kinds of medicines trigger low blood pressure.    Limit your alcohol intake to no more than 2 drinks a day for men and 1 drink a day for women. Alcohol can dehydrate you even further. It can also interfere with the effectiveness of medicines.    Prevent dehydration by drinking plenty of fluids, unless otherwise instructed by your healthcare provider.    Learn to take your own blood pressure. Keep a record of your results. Ask your healthcare provider which readings mean that you need medical attention.    Tell your family members to call an ambulance if you become unconscious. Ask them to learn CPR.  Follow-up care  Make a follow-up appointment, or as advised.    Call 911  Call 911 right away if you have:     Chest pain    Shortness of breath  When to call your healthcare provider  Call your healthcare provider right away if you have any of the following:     Dizziness or fainting spells    Black, maroon, or tarry stools    Irregular heartbeat    Stiff neck    Severe upper back pain    Diarrhea or vomiting that doesn t go away    Inability to eat or drink    Burning sensation when you urinate    Urine with a strong, unpleasant odor    Fainting with exercise  [employer] last reviewed this educational content on 7/1/2019 2000-2020 [employer]. All rights reserved. This information is not intended as a substitute for professional medical care. Always follow your healthcare professional's instructions.    HOLD YOUR BP MEDS (Lisinopril and HTCZ) until you   Have your lab results back. Call your PCP tomorrow and explain the   30 lb weight loss and that your BP was running 88-90 systolic.   Your blood count shows a mildly elevated white blood count and   Should be rechecked by your provider in 1-2 days to see if it returns to normal.        Results for orders placed or performed in visit on 03/09/21   CBC with platelets and differential     Status: Abnormal   Result Value Ref Range    WBC 13.7 (H) 4.0 - 11.0 10e9/L    RBC Count 5.32 4.4 - 5.9 10e12/L    Hemoglobin 14.6 13.3 - 17.7 g/dL    Hematocrit 45.1 40.0 - 53.0 %    MCV 85 78 - 100 fl    MCH 27.4 26.5 - 33.0 pg    MCHC 32.4 31.5 - 36.5 g/dL    RDW 12.7 10.0 - 15.0 %    Platelet Count 297 150 - 450 10e9/L    % Neutrophils 65.9 %    % Lymphocytes 20.4 %    % Monocytes 10.5 %    % Eosinophils 3.0 %    % Basophils 0.2 %    Absolute Neutrophil 9.0 (H) 1.6 - 8.3 10e9/L    Absolute Lymphocytes 2.8 0.8 - 5.3 10e9/L    Absolute Monocytes 1.4 (H) 0.0 - 1.3 10e9/L    Absolute Eosinophils 0.4 0.0 - 0.7 10e9/L    Absolute Basophils 0.0 0.0 - 0.2 10e9/L    Diff Method Automated Method

## 2021-03-15 ENCOUNTER — MEDICAL CORRESPONDENCE (OUTPATIENT)
Dept: HEALTH INFORMATION MANAGEMENT | Facility: CLINIC | Age: 41
End: 2021-03-15

## 2021-03-15 NOTE — TELEPHONE ENCOUNTER
Rx sent with updated diagnosis as requested by [facility] due to insurance coverage.  Please fax

## 2021-03-17 DIAGNOSIS — R73.03 PREDIABETES: ICD-10-CM

## 2021-03-17 DIAGNOSIS — R94.4 ABNORMAL RENAL FUNCTION TEST: ICD-10-CM

## 2021-03-17 DIAGNOSIS — D72.829 LEUKOCYTOSIS, UNSPECIFIED TYPE: ICD-10-CM

## 2021-03-17 LAB
ANION GAP SERPL CALCULATED.3IONS-SCNC: 4 MMOL/L (ref 3–14)
BASOPHILS # BLD AUTO: 0 10E9/L (ref 0–0.2)
BASOPHILS NFR BLD AUTO: 0.3 %
BUN SERPL-MCNC: 16 MG/DL (ref 7–30)
CALCIUM SERPL-MCNC: 9 MG/DL (ref 8.5–10.1)
CHLORIDE SERPL-SCNC: 107 MMOL/L (ref 94–109)
CO2 SERPL-SCNC: 26 MMOL/L (ref 20–32)
CREAT SERPL-MCNC: 1.12 MG/DL (ref 0.66–1.25)
DIFFERENTIAL METHOD BLD: NORMAL
EOSINOPHIL # BLD AUTO: 0.3 10E9/L (ref 0–0.7)
EOSINOPHIL NFR BLD AUTO: 3.8 %
ERYTHROCYTE [DISTWIDTH] IN BLOOD BY AUTOMATED COUNT: 12.5 % (ref 10–15)
GFR SERPL CREATININE-BSD FRML MDRD: 83 ML/MIN/{1.73_M2}
GLUCOSE SERPL-MCNC: 83 MG/DL (ref 70–99)
HBA1C MFR BLD: 5.5 % (ref 0–5.6)
HCT VFR BLD AUTO: 44.9 % (ref 40–53)
HGB BLD-MCNC: 14.7 G/DL (ref 13.3–17.7)
LYMPHOCYTES # BLD AUTO: 1.5 10E9/L (ref 0.8–5.3)
LYMPHOCYTES NFR BLD AUTO: 20.9 %
MCH RBC QN AUTO: 27.7 PG (ref 26.5–33)
MCHC RBC AUTO-ENTMCNC: 32.7 G/DL (ref 31.5–36.5)
MCV RBC AUTO: 85 FL (ref 78–100)
MONOCYTES # BLD AUTO: 0.5 10E9/L (ref 0–1.3)
MONOCYTES NFR BLD AUTO: 6.7 %
NEUTROPHILS # BLD AUTO: 5 10E9/L (ref 1.6–8.3)
NEUTROPHILS NFR BLD AUTO: 68.3 %
PLATELET # BLD AUTO: 246 10E9/L (ref 150–450)
POTASSIUM SERPL-SCNC: 4.2 MMOL/L (ref 3.4–5.3)
RBC # BLD AUTO: 5.31 10E12/L (ref 4.4–5.9)
SODIUM SERPL-SCNC: 137 MMOL/L (ref 133–144)
WBC # BLD AUTO: 7.3 10E9/L (ref 4–11)

## 2021-03-17 PROCEDURE — 83036 HEMOGLOBIN GLYCOSYLATED A1C: CPT | Performed by: FAMILY MEDICINE

## 2021-03-17 PROCEDURE — 85025 COMPLETE CBC W/AUTO DIFF WBC: CPT | Performed by: FAMILY MEDICINE

## 2021-03-17 PROCEDURE — 80048 BASIC METABOLIC PNL TOTAL CA: CPT | Performed by: FAMILY MEDICINE

## 2021-03-17 NOTE — RESULT ENCOUNTER NOTE
Dear Armando,  Your lab test showed excellent and normal kidney functions normal A1c with no concern for diabetes or prediabetes and normalized white blood counts.  This is good and reassuring.   Let me know if you have any questions. Take care.  Yogesh Zaldivar MD

## 2021-04-08 ENCOUNTER — ALLIED HEALTH/NURSE VISIT (OUTPATIENT)
Dept: NURSING | Facility: CLINIC | Age: 41
End: 2021-04-08
Payer: COMMERCIAL

## 2021-04-08 VITALS
DIASTOLIC BLOOD PRESSURE: 79 MMHG | BODY MASS INDEX: 46.81 KG/M2 | OXYGEN SATURATION: 97 % | SYSTOLIC BLOOD PRESSURE: 116 MMHG | WEIGHT: 315 LBS | HEART RATE: 84 BPM

## 2021-04-08 DIAGNOSIS — I10 HYPERTENSION GOAL BP (BLOOD PRESSURE) < 140/90: Primary | ICD-10-CM

## 2021-04-08 PROCEDURE — 99207 PR NO CHARGE NURSE ONLY: CPT

## 2021-04-08 NOTE — PROGRESS NOTES
Armando Domingo is a 40 year old patient who comes in today for a Blood Pressure check.  Initial BP:  /79   Pulse 84   Wt (!) 153.3 kg (338 lb)   SpO2 97%   BMI 46.81 kg/m       84  Disposition: results routed to provider.    Sharon Moreno MA

## 2021-04-11 ENCOUNTER — HEALTH MAINTENANCE LETTER (OUTPATIENT)
Age: 41
End: 2021-04-11

## 2021-04-21 ENCOUNTER — OFFICE VISIT - HEALTHEAST (OUTPATIENT)
Dept: SURGERY | Facility: CLINIC | Age: 41
End: 2021-04-21

## 2021-04-21 DIAGNOSIS — E66.01 MORBID OBESITY WITH BMI OF 45.0-49.9, ADULT (H): ICD-10-CM

## 2021-04-21 DIAGNOSIS — G47.33 OSA (OBSTRUCTIVE SLEEP APNEA): ICD-10-CM

## 2021-04-21 DIAGNOSIS — E88.810 METABOLIC SYNDROME: ICD-10-CM

## 2021-06-05 VITALS — BODY MASS INDEX: 46.93 KG/M2 | WEIGHT: 315 LBS

## 2021-06-05 VITALS — WEIGHT: 315 LBS | BODY MASS INDEX: 42.66 KG/M2 | HEIGHT: 72 IN

## 2021-06-05 VITALS — HEIGHT: 72 IN | WEIGHT: 315 LBS | BODY MASS INDEX: 42.66 KG/M2

## 2021-06-14 NOTE — PROGRESS NOTES
Armando Domingo is 40 y.o.  male who presents for a billable video visit today.    How would you like to obtain your AVS? E-Mail (Inform patient AVS not encrypted with this option).  If dropped from the video visit, the video invitation should be resent by: Text to cell phone: [phone]  Will anyone else be joining your video visit? No      Video Start Time: 10:17 AM    Provider Notes:       New Medical Weight Management Consult    PATIENT:  Armnado Domingo  MRN:         [id number]  :         1980  ANGELA:         2021        I had the pleasure of seeing your patient, Armando Domingo.  Full intake/assessment was done to determine barriers to weight loss success and develop a treatment plan. Armando Domingo is a 40 y.o. male interested in treatment of medical problems associated with weight. 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 He's hoping that home ownership will give him some additional chores/activities.  He's not interested in surgical weight loss at this point in time but was contemplative about viewing the seminar discussing that program so I'll give him the link to the video.     Review of intake reveals functional alcoholism likely with 6-9drinks most nights of hard liquor mixed with soda pop that likely adds nearly a 1000 kcal intake. He's already cut back and hasn't had any for the last 4-5 days without any withdrawal symptoms. I've encouraged dramatic long term reduction given his fatty liver disease seen on CT 5 years ago.     He's developed metabolic syndrome recently with A1c now above 6% and comorbid hypertension and BMI of 50. Weight loss should help address and if not having good success his vascular health/liver health would be dramatically improved with use of surgical tools if able to embrace the bariatric lifestyle required for good success.     Vitals:    21 0900    Weight: (!) 370 lb (167.8 kg)   Height: 6' (1.829 m)     Body mass index is 50.18 kg/m .    Plan:  1. Welcome to weight loss season. To start, set up follow up with sleep clinic to help management of your severe sleep apnea that is harming you each night. If assistance in scheduling needed, call [phone].  2. Meals should be high in lean protein, aim for 30g/meal every 4-5 hours for now and try to let supper be your last food of the day if possible. Prep veggies for easy grab/go or easy to add a lean protein to for lunch/supper or for a better alternative if crunchy snack is needed.  3. Trial of bupropion/naltrexone:  Start one tab of bupropion in the morning and half a tablet of Naltrexone with Supper (take with food to limit upset stomach feelings). After one week, start taking the two medications together at breakfast and supper. Effect will build over the first 4 weeks. Stop naltrexone if any need for opiate pain medications.  4. Reduce alcohol to no more than once weekly, one drink maximum. If struggling with cessation, reach out and I'm happy to put in a referral to the chemical dependency clinic for resources/options.  Calorically, this change alone should help a 5-10lb weight loss this first 6 weeks.  5. Walking program 4 days weekly. Start w/ 10 minutes if you can. Look into other activities you may enjoy and go for it. Give yourself permission to ease into exercise the first 3 weeks, developing a base to avoid injury as you start to ramp up week 3. Look to add 2-3 minutes weekly to session with a goal of 150-200minutes weekly of moderate aerobic activity and ideally 2 days of 10-20 minutes of strength work using body weight/handweights/machines/swimming/bands/pilates or whatever gets your muscles working.  6. Labs can be done anytime. I'll message results once all are back. I anticipate some low vitamin D likely.  7. We'll see how things are going and consider start of metformin at our follow up to  address metabolic syndrome. For now, reduction in alcohol and bupropion/naltrexone may be adequate for weight loss/improvements in insulin resistance.   ASSESSMENT:  Armando is a patient with mature  onset super morbid obesity   with significant element of familial/genetic influence and   with current health consequences. He   does need aggressive weight loss plan due to signs of hepatic steatosis, severe sleep apnea and evolving metabolic syndrome with recent A1c 6% in combination with hypertension/super morbid obesity.  Armando Domingo eats a high carb diet, eats fast food once or more per week, uses food as mood management, eats most meals in front of TV, tends to snack/graze throughout day, rarely sitting to eat a true meal and has a disorganized meal pattern.    His problem is complicated by strong craving/reward pathways, impaired metabolic perception, poor lifestyle choices and excess alcohol intake nightly (hard liquor w/ soda pop), 500-900kcal most days.    His ability to lose weight is impacted by current work life, inability to perceive that food intake is at a level that prevents weight loss and lack of education on nutrition and dietary needs.    PLAN:    Eat breakfast daily, Purge house of food triggers, No meal skipping, Decrease caloric beverages by removing alcohol (hasn't had withdrawal sx after 5 days of no alcohol so madi can continue going cold turkey. discussed tapering or detox options if needed but not interested in any formal alcohol dependency assistance/programs at this time., Dietician visit of education, Seek structured exercise program and Sleep apnea adherence encouraged    Craving/Reward   Ancillary testing:  N/A.  Food Plan:  High protein/low carbohydrate.   Activity Plan:  Activity journal.  Supplementary:  N/A.   Medication:  The patient will begin medication in pursuit of improved medical status as influenced by body weight. He will start naltrexone. Patient was made aware that  "naltrexone is not approved for the treatment of obesity and bupropion. Patient was made aware that bupropion is not approved for the treatment of obesity.  There is a mutual understanding of the goals and risks of this therapy. The patient is in agreement. He is educated on dosage regimen and possible side effects.        No orders of the defined types were placed in this encounter.      He has the following co-morbidities:    [facility] CO-MORBIDITIES 1/20/2021   How has your weight changed over the last year?  Gained   How many pounds? 20   I have the following health issues associated with obesity: High Blood Pressure, Sleep Apnea, GERD (Reflux)   I have the following symptoms associated with obesity: Knee Pain, Back Pain, Fatigue       Patient goals reviewed with patient 1/20/2021   I am interested in having a healthier weight to diminish current health problems: Yes   I am interested in having a healthier weight in order to prevent future health problems: Yes   I am interested in having a healthier weight in order to have a future surgery: No   I have the following family history of obesity/being overweight:  My mother is overweight, My father is overweight   I have the following family history of obesity/being overweight:  My mother is overweight, My father is overweight       Referring Provider 1/20/2021   Please name the provider who referred you to Medical Weight Management.  If you do not know, please answer: \"I Don't Know\". [facility]        Weight History Reviewed With Patient 1/20/2021   How concerned are you about your weight? Very Concerned   Would you describe your weight gain as gradual? Yes   I became overweight: As a Child   The following factors have contributed to my weight gain:  Change in Schedule, Eating Too Much, Lack of Exercise, Stress   My lowest weight since age 20 was: 230   My highest weight since age 20 was: 370, current weight.   The most weight I have ever lost was: " (lbs) 50   Success w/ weight loss in the past was with exercise equipment in the house and used regularly and focused more on more mindful dining out (subway rather than burgers/etc). .    Diet Recall Reviewed With Patient 1/20/2021   Do you typically eat breakfast? Yes   If you do eat breakfast, what types of food do you eat? Mcmillan/sausage & eggs, fruit & yogurt & granola, hashbrowns/potatoes   Do you typically eat lunch? Yes   If you do eat lunch, what types of food do you typically eat?  Anything available   Do you typically eat supper? Yes   If you do eat supper, what types of food do you typically eat? Meat, veggie, starch   Do you typically eat snacks? Yes   If you do snack, what types of food do you typically eat? chips, meat cheese and crackers   Do you like vegetables?  Yes   Do you drink water?  Yes   How many glasses of juice do you drink in a typical day? 0   How many of glasses of milk do you drink in a typical day? 0   How many 8oz glasses of sugar containing drinks such as Chuck-Aid/sweet tea do you drink in a day? 0   How many cans/bottles of sugar pop/soda/tea/sports drinks do you drink in a day? 2   How many cans/bottles of sugar pop/soda/tea/sports drinks do you drink in a day? 2   How often do you have a drink of alcohol? 4 or More Times a Week   If you do drink, how many drinks might you have in a day? 7-9   Alcohol cessation recommendations today were aim for no more than one or two per week if any, he's already cut back and no use in the last 4-5 days without any withdrawal sx..    Eating Habits Reviewed With Patient 1/20/2021   Eats Starches Almost Everyday   Generally, my meals include foods like these: fried meats, brats, burgers, french fries, pizza, cheese, chips, or ice cream. A Few Times a Week   Eat fast food (like McDonalds, Burger Bon, Taco Bell). Less Than Weekly   Buffet Never   Watches TV Never   Often skip meals, eat at random times, have no regular eating times. Never   Grazes  Never   Eat extra snacks between meals. Almost Everyday   Eat most of my food at the end of the day. Never   Eats at Night Never   Eat extra snacks to prevent or correct low blood sugar. Never   Eat to prevent acid reflux or stomach pain. A Few Times a Week   Worry about not having enough food to eat. Never   Have you been to the food shelf at least a few times this year? No   I eat when I am depressed. Once a Week   I eat when I am stressed. A Few Times a Week   I eat when I am bored. A Few Times a Week   I eat when I am anxious. A Few Times a Week   I eat when I am happy or as a reward. Once a Week   I feel hungry all the time even if I just have eaten. Less Than Weekly   Feeling full is important to me. Never   I finish all the food on my plate even if I am already full. Almost Everyday   I can't resist eating delicious food or walk past the good food/smell. Less Than Weekly   I eat/snack without noticing that I am eating. Never   I eat when I am preparing the meal. Never   I eat more than usual when I see others eating. Never   I have trouble not eating sweets, ice cream, cookies, or chips if they are around the house. A Few Times a Week   I think about food all day. Never   What foods, if any, do you crave? Chips / Crackers, Cheese   Please list any other foods you crave. Pasta, Seafood       Activity/Exercise History Reviewed With Patient 1/20/2021   How much of a typical 12 hour day do you spend sitting? Half the Day   How much of a typical 12 hour day do you spend lying down? Less Than Half the Day   How much of a typical day do you spend walking/standing? Half the Day   How many hours (not including work) do you spend on the TV/Video Games/Computer/Tablet/Phone? 2-3 Hours   How many times a week are you active for the purpose of exercise? Never   How many total minutes do you spend doing some activity for the purpose of exercising when you exercise? 15-30 Minutes   What keeps you from being more active?  Lack of Time, Too Tired, Unsure What To Do, Worried People Will Look at Me       PAST MEDICAL HISTORY:  Past Medical History:   Diagnosis Date     GERD (gastroesophageal reflux disease)      Hypertension      AMADEO (obstructive sleep apnea)     Does not use anyting     Restless legs        Work/Social History Reviewed With Patient 1/20/2021   My employment status is: Full-Time   My job is:    How much of your job is spent on the computer or phone? 75%   How many hours do you spend commuting to work daily?  0-1   What is your marital status?  / In a Relationship   If in a relationship, is your significant other overweight? Yes   Do you have children? Yes   If you have children, are they overweight? No   Who do you live with?  Wife, Two adult kids and Grandson   Are they supportive of your health goals? Yes   Who does the food shopping?  Mostly Wife and I       Mental Health History Reviewed With Patient 1/20/2021   Have you ever been physically or sexually abused? No   If yes, do you feel that the abuse is affecting your weight? N/A   If yes, would you like to talk to a counselor about the abuse? N/A   How often in the past 2 weeks have you felt little interest or pleasure in doing things? For Several Days   Over the past 2 weeks how often have you felt down, depressed, or hopeless? For Several Days       Sleep History Reviewed With Patient 1/20/2021   How many hours do you sleep at night? 8   Do you think that you snore loudly or has anybody ever heard you snore loudly (louder than talking or so loud it can be heard behind a shut door)? Yes   Has anyone seen or heard you stop breathing during your sleep? Yes   Do you often feel tired, fatigued, or sleepy during the day? Yes   Do you have a TV/Computer or other screens in your bedroom? Yes       MEDICATIONS:   Current Outpatient Medications   Medication Sig Dispense Refill     aspirin 81 MG EC tablet Take 81 mg by mouth.        hydroCHLOROthiazide (HYDRODIURIL) 25 MG tablet Take 25 mg by mouth.       lisinopriL (PRINIVIL,ZESTRIL) 40 MG tablet Take 40 mg by mouth.       No current facility-administered medications for this visit.        ALLERGIES:   No Known Allergies    PHYSICAL EXAM:  There were no vitals filed for this visit.     Waist Circumference:      Wt Readings from Last 4 Encounters:   01/21/21 (!) 370 lb (167.8 kg)     A & O x 3  HEENT: thick neck. No swelling evident on video. Normal speech. Goatee facial hair.  Respirations unlabored good excursion.   Location of obesity: Central Obesity   No pitting edema on self palpation of ankles.    FOLLOW-UP:    4 weeks.    TIME: 70 min spent on evaluation, management, counseling, education, & motivational interviewing with greater than 50 % of the total time was spent on counseling and coordinating care    Sincerely,    Yovani Patel MD            Video-Visit Details    Type of service:  Video Visit    Video End Time (time video stopped): 11:52 AM  Originating Location (pt. Location): Home    Distant Location (provider location):  [facility] [place]     Platform used for Video Visit: [employer]

## 2021-06-14 NOTE — PROGRESS NOTES
Armando Domingo is a 40 y.o. male who is being evaluated via a billable video visit.       How would you like to obtain your AVS? MyChart.  If dropped from the video visit, the video invitation should be resent by: Send to e-mail at: [email or web address]  Will anyone else be joining your video visit? No     Video Start Time: 3:25 PM      Medical  Weight Loss Initial Diet Evaluation  Armando is presenting today for a new weight management nutrition consultation. Pt has had an initial appointment with Dr. Patel.  Weight loss medication: Naltrexone/bupropion.  Nutrition Assessment:   Anthropometrics:  Pt's Initial Weight: 370 lbs  Weight: (!) 370 lb (167.8 kg)  Weight loss from initial: 0  % Weight loss: 0 %    BMI: There is no height or weight on file to calculate BMI.   Ideal body weight: 77.6 kg (171 lb 1.2 oz)  Adjusted ideal body weight: 113.7 kg (250 lb 10.3 oz)  Estimated RMR ([place]-St Jeor equation):  2617 kcals x 1.2 (sedentary) = 3140 kcals (for weight maintenance)    Recommended Protein Intake:  grams of protein/day  Medical History:  Patient Active Problem List   Diagnosis     Morbid obesity (H)      Diabetes: No  No results found for: HGBA1C  Nutrition History:   Vitamins/Mineral Supplementation: none  Dietary Recall:  He has been using the [employer] - yesterday tracked 1100 kcals. We reviewed calorie needs today  Breakfast: Breakfast Dawson, 2 pieces henry, cheese and egg  Lunch: [place] Salad with chicken and raspberry vinaigrette  Dinner: Lettuce, chicken and balsamic vinagrette  Typical Snacks:   Beverages:   Water: 20 oz 5-6x per day  V[employer] fusion   Has stopped drinking alcohol  Exercise:   None at this time.  Nutrition Diagnosis (PES statement):   Overweight/Obesity (NC 3.3) related to overeating and poor lifestyle habits as evidenced by and BMI 49.5  Nutrition Intervention:  1. Food and/or Nutrient Delivery   a. Placed emphasis on importance of developing a healthy  meal routine, aiming for 3 meals a day.  2. Nutrition Education   a. Discussed with patient how to build a meal: the importance of including a lean/low fat protein at each meal, include a source of vegetables at a minimum of lunch and dinner and limiting carbohydrate intake to less than 1/4 of meal.  b. Educated on sources of lean protein, portion sizes, the amount of grams found in each source. Recommend patient to aim for 30-35g protein at each meal.  c. Discussed the importance of adequate hydration, with emphasis on drinking 64oz of water or zero calorie beverages per day.  3. Nutrition Counseling   a. Encouraged importance of developing routine exercise for health benefits and weight loss.  Goals established by patient:   1. Continue tracking intake in [employer]. Aim for 4016-1744 kcals per day, 30-35 g protein at meals, 1/4 or less of plate is carbohydrate foods  2. Continue eliminating alcohol and focusing on plain water intake, 64 oz minimum  Assessment/Plan:    Pt will follow up in 1 month(s) with bariatrician and 2 month(s) with dietitian.     Video-Visit Details    Type of service:  Video Visit    Video End Time (time video stopped): 3:50 pm  Originating Location (pt. Location): Home    Distant Location (provider location):  [facility] [place]     Platform used for Video Visit: [email or web address]

## 2021-06-15 NOTE — PATIENT INSTRUCTIONS - HE
Plan:  1. Excellent weight reduction of over 6% this past month! A top tier result that comes from the mindful changes you've made.  Continuing aiming for those 1900-2100kcal/day for consistent weight loss and 100g of lean protein daily. Hydrate well and continue the excellent avoidance of alcohol that has expedited this first chunk of weight loss.    2. Get labs done at your upcoming clinic visit (orders in the system, have [place] clinic call us if they can't see the orders).     3. We can consider medication support if any early and prolonged plateaus occur. Bupropion/Naltrexone would be a good option likely for you. You could see if your insurance covers Liraglutide/Semaglutide as well (injectables daily vs once weekly).     4. Follow up as planned with dietician. Ramping up fiber intake and continuing to hit lean protein goals should be beneficial going forward.    5. Feet limit too much walking so after a warm up on treadmill that's tolerable start some boxing/yoga/weights or other non ambulating aerobic fitness. Ideally, we'd like you to have over 10 minutes at a heart rate over 130 beats per minute, looking to increase 60-90 seconds each week with time spent at that intensity with a goal of eventually spending 35-70 minutes a few days weekly at that intensity by summer.    6. Recheck with me in 6-8 weeks for progress check, sooner if new challenges are throwing things off.        High Intensity Interval Training (HIIT):  Armando, for you the HIIT program can be done without walking with either upper body/boxing/cycling/flow yoga options.    To feel our best, our bodies need to move. Our mental health, sleep, memory, immune function, stress coping and metabolic health depend on exercise and its benefits for optimizing how our body works best. In the modern world, most do not get nearly enough exercise.  However, it's important to understand that ANYTHING is better than nothing and if you can get started with  "a routine for fitness, even a little bit has some benefit compared to none.  The more you do, the better (up to 20 hours weekly, beyond that the benefit tails off), but the NIH guidelines for all adults in [place] is to work up to 150-200 minutes of moderate aerobic activity each week to improve our health.  If you're a person that struggles with time pressure/lack of interest then those 2.5-3 hours weekly may be too much to ask.  So, we have to be very efficient in how we exercise to reap the benefits. It turns out that INTENSITY matters. When we use Vigorous rather than Moderate aerobic activity (Vigorous defined by a heart rate of 70-85% of calculated maximum heart rate; max heart rate equals 220 beats minus your age or the level of effort where you could talk 2-4 words before needing to take a breath), the amount of time required to reap the benefits of exercise is cut in half:  75-90 minutes/week.      A recent study showed that a 10 minute interval workout using a 3-4 minute warm up and then 20 second \"maximum effort\" followed by 1 minute, 40 seconds of recovery and then repeated two more times was as effective in improving fitness as a 30 minute brisk walk.  They utilized exercise bikes or stairs for the effort but you could adapt to whatever geography/gym/pool/bike/hill/staircase that you may have as long as you can safely do the effort without injury.   As your fitness improves, intervals of 30 seconds to 2 minutes of increased effort followed by 1-2 minutes of recovery are options as well. The fitter you become, the harder and more intervals you'll be able to do.  Start with what you CAN do, not what you WANT to do and that will allow your body to adapt/develop fitness down the road to reach your goals.  Give yourself permission to develop a base of fitness the first 3 weeks and then you should be able to ramp up from there nicely and progressively each week.    Jumping right into a High Intensity Interval " workout if you've not been having some level of exercise/fitness recently can increase your risk of injury.  To help develop some base fitness here are some options that would give you a 3-4 week base development, assuming you can walk or ride a stationary bike but haven't been doing much the last few months. 3-4 sessions each week of:      Week Warm up Interval: 3-6 repeats Cooldown   1 3-5 minutes easy walk/spin 20 seconds brisk but doable pace then recover with 90 seconds easy 2-3 minute easy walk/spin   2 3-5 minutes easy walk/spin 25 seconds brisk, 90 seconds recovery 2-3 minute easy walk/spin   3 3-5 minutes easy walk/spin 30 seconds brisk, 90 seconds recovery 2-3 minute easy walk/spin   4 3-5 minutes easy walk/spin 40 seconds brisk, 60 seconds recovery 2-3 minute easy walk/spin     *for base development, keep resistance steady and just  the speed. Once you've completed base phase, feel free to add a little extra resistance to the faster phase to increase vigorousness/heart rate.  During base phase you should be still able to talk comfortable/sing during faster pedaling/walking.     After completing the base phase, start ramping up workouts on the bike/walking. Have one easy pace workout but of longer duration (add 2-3 minutes each week to the time) each week.  One workout weekly should have an interval workout where you push your intensity working up to those 15-30 second maximum efforts and recovering fully and then repeating for at least 3-4 intervals.  Cool down/easy pedal at the end for 1-2 minutes.     Consider a spin class if you have the means/access and remember, it's OK to rest if needed. Make the workout yours and don't focus on other people's abilities, getting started will develop your own fitness every week.  Jogging plans such as the Couch to 10k or any aerobic training program make use of similar intervals and can help you reach a fitter and more capable body regardless of where/how you  perform the intervals (walking/biking/swimming/elliptical/row machine/erg arm machine, peddler, raking, lawn mowing,etc).  Go for it.    Exercise Guidance    Nearly everything that bothers us gets better when the proper amount of exercise can be done in the proper amounts.  Getting to that level safely and without injury is the key.  When it comes to weight loss, exercise is especially important in maintaining the weight loss.  Unfortunately, one of the harsh realities is that substantial weight loss slows our metabolism, often anywhere from 5-20%.    Our brain always remembers our heaviest weight and we can return to that if we're not mindful and moving regularly.  Our biology doesn't understand the concept of having too much energy, only not having enough.  As such, when we lose weight, it's thought that the brain interprets this as we're ill or in a famine and dials back our metabolism to limit further weight loss.  This is why exercise is so important in keeping the weight off and is the main reason people have some weight regain from their low weight point after weight loss.  We have to make up that 10-20% of calories not being burned.Since we can restrict our intake for only so long, exercise becomes very important in our long term healthy weigh maintenance to balance out the occasional indiscretion with our diet.    Generally, for every 5% body weight reduction in a weight loss season, a person needs to add  kilocalories of exercise in their daily routine to keep that weight off for the long term.  This is why it's vital to be starting your fitness regimen during weight loss season, so that routine is well established as you move into your maintenance period.    Additionally, all sorts of good enzymes and genes turn on with exercise and our stress, sleep, mood and bodies feel better when we can get to the point of making ourselves a little sweaty and short of breath 35-50 minutes most days of the week.  "But we have to start with what we can do first and give ourselves permission to work our way up to this goal.    Who isn't ready for exercise? Well, if you get severe dizziness/palpitations, chest pain or short of breath/faint with even minimal activity like walking across a room or you're having to pause while going up a flight of stairs, then getting your heart and/or lungs fully evaluated prior to starting an exercise regimen is recommended. Everyone else can probably start a program, but everyone may start at a different point:  Some can set a 5-10 minute walking goal and others will be able to ride their bike for an hour.      Start with where you're at and look to add 10% more each week until you're at that 150 minutes or more a week (or 75 minutes/week or more of vigorous exercise). Moderate exercise can be estimated as the pace you can carry on a conversation and vigorous is the pace at which you can get 3-5 words out before having to take a breath.  If you're using heart rate monitoring, Moderate is about 60% of your maximum heart rate and vigorous about 75%. (Max heart rate estimated as 220 beats minus your age:  Example: 220-age of 46 =176 Beats per minute (BPM) maximum. 0.6X 176= 105 BPM (moderate), 132 BMP(vigorous)).    If you like to count steps, the 10,000 steps per day does correlate well with weight maintenance but try to make at least 20-25% of those steps at a brisk pace (like you are about to miss your bus).    Finally, if you are pressed for time, it's important to know that some exercise is better than none.  High Intensity Interval training (HIIT) is a good way to get as much out of a short period of working out. If you can't walk, use the stairs, bike or swim; you could use a punching/arm workout regimen for your activity.  The idea with HIIT is to have a 3-6 minute warm up period of low intensity and the 3-6 \"intervals\" where you push the intensity up and then recover and start the next " "interval. One study showed that 3 intervals of 20 seconds at \"Maximum Effort\" while either biking on a stationary bike or going up stairs and then having 100 seconds recovery time before the next Maximum Effort was equally as beneficial on cardiovascular fitness development as doing 30 minutes of moderately paced walking 3 days weekly over a 6 week period of time.  So intensity matters. You just need to be able to safely do your desired exercise without injury. There are many great HIIT exercises/routines out there. IF you're not doing much exercise currently, I recommend giving your self 2-3 weeks of moderate exercise, 3 days weekly minimum to get your bones/tendons/muscles used to exercise before going for High Intensity workouts.    If you like to use Apps on the phone, the couch to 5k taylor and 7 minute workout apps are nice places to start if you are reasonably healthy.  There are hundreds of other options out there.  Consider viewing [employer] if gentler exercise/movement is desired. Videos on Tho Chi and chair yoga for seniors exist and are free. Check them out and let's get that 3-4 days a week routine going.    Let's move!  Yovani Patel MD.     MEDICATIONS FOR WEIGHT LOSS  There are several medications available to assist us in weight loss.  By themselves, without compliance to a change in diet and increase in movement/activity these medications are disappointing in their results. However, combined with a closely monitored program of diet change and exercise they can be very effective in controlling appetite and boosting initial weight loss.  All weight loss medications need continual re-evaluation for efficacy as their side effects and health benefits fail to be worthwhile if a person is not continuing to lose weight or in maintaining their healthy weight.  Some weight loss medications are scheduled drugs, meaning there is at least a theoretical possibility for developing addiction to them but in practice " this is rare.  We do anticipate coming off meds in the future after stabilization of weight loss is assurred.  Finally, a tolerance can develop and people s perceived efficacy of medication can diminish.  In communication with your physician, it may be appropriate to intermittently take a break from these medications and then restart again (few weeks off then restart again) if a plateau is reached that cannot be broken through.  Each person can respond to a medication differently and to be a good option for you, it will need to be affordable, effective and well tolerated with minimal side effects.    In most cases, weight loss progress after one month and three months will be obtained and if a patient is not reaching the satisfactory progress towards weight loss, the medications may be discontinued.  The thought is that if a person is taking a weight loss medication and not receiving the potential health benefit of that drug, the side effects are not worthwhile and use should be discontinued.  On the flip side, there are many people on some weight loss medications for years because it continues to be an effective tool in their weight management and they are tolerating the medication without any long-term side effects.  Each person's response and purpose will be evaluated.      PHENTERMINE (Adipex): approved in 1959 for appetite suppression.  It has stimulant effects and cannot be used with Ritalin, Concerta, or other stimulants.  Although it is not highly addictive, it's chemically related to amphetamines which are addictive.  Occasional dependence can develop, but rarely. The most common side effects are dry mouth, increased energy and concentration, increased pulse, and constipation.  You should not take phentermine if you have glaucoma, hyperthyroidism, or uncontrolled/untreated hypertension or overly anxious. You should stop if dramatic mood swings, severe insomnia, palpations, chest pains, visual changes or if  your Blood Pressure is consistently elevated or any time it's over 160/90.   It's ok to go off the med for a few weeks and restart if efficacy is wearing off.  $24-$30 for 90 tablets at [facility]. Females are required to have reliable birth control to reduce the risk birth defects/miscarriage.      TOPIRAMATE (Topamax): Anti-seizure medication, also used to prevent migraines and sometimes for mood stabilization.  Side effects include paresthesia, glaucoma, altered concentration, attention difficulties, memory and speech problems, metabolic acidosis, depression, increase in body temperature and decrease sweating, risk of kidney stones.  Do not take Topamax while taking Depakote as this can cause high ammonia levels.  You must have reliable birth control as Topamax can cause birth defects.  If prolonged use has occurred it should be tapered off slowly to avoid withdrawal issues.  Insurance usually covers Topiramate.  At higher doses, there may be some confusion/forgetfulness associated with this so we try to limit dose to under 75mg twice daily to reduce this risk. Often covered by insurance as it's used for many reasons.  Topamax will cause carbonated beverages to taste bad. A recheck of your kidney/electrolytes may occur within a few months of starting.    QSYMIA (Phentermine + Topamax):  See above information about phentermine and Topamax.  Most common side effects are paresthesia, dizziness, distortion of taste, insomnia, constipation, and dry mouth.  See above descriptions for the two individual agents.Females are required to have reliable birth control to reduce the risk birth defects/miscarriage.  $150-$220 per month      Liraglutide (Victoza/Saxenda):   Part of the family of Glucagon Like Peptide Agonists, liraglutide directly suppresses appetite and is often used by diabetic patients due to decrease liver production of glucose, thereby improving glucose levels.  It also slows how quickly the stomach  "empties. May be hard to get covered for non diabetics and is an injectable medication delivered via a injector pen. It can be very costly without insurance coverage (over $500/month).  Small risk for pancreatitis and dose should be held if increased mid abdominal pain/burning. It is not to be used if previous Multiple Endocrine Neoplasia. In rodents, may increase risk of thyroid tumors and not indicated for anyone with hx of medullary thyroid cancer as a result.  If changes in voice/swallowing should be discontinued. Reliable birth control required in women.    Contrave (Bupropion/Naltrexone).    Synergistic combination of a mild appetite suppressing anti-depressant (Bupropion) whose effects are increased due to interaction with Naltrexone.  Naltrexone may have some effects on craving and is often used in addiction medicine to help previous opiate addicts be less prone to relapse as it blocks the action of opiates. Should be stopped if any need for opiate pain medication, surgery or planned procedures where you'll be given sedation/anesthesia. If prolonged use recommend stepping down bupropion over 2-3 weeks to limit any risk of withdrawal issues. Side effects may include dry mouth, increased heart rate, mild elevation in Blood pressure;  dizziness, ringing in the ears, anxiety (typically due to bupropion), nausea, constipation, and some get fatigued with naltrexone.  About $210 on [facility] for 120 tabs of \"Contrave\", the brand name without insurance coverage. Generic Bupropion 75mg: $25 for 120 tabs, Naltrexone: $55 for 90 tabs without insurance coverage on [employer]. Cannot be used if pregnant/trying to conceive or breast feeding.      Plenity:   Recently available October 2020, by mail order pharmacy only, but expensive. $98/month.  2-3 capsules taken 20 minutes before 2 meals daily provides crystals that expand into a gel that provides a mechanical fullness. Gel mixes with your next meal and increases " "satisfaction by bulking the meal up to feel bigger than it truly is. Plenity is not absorbed and gets passed through the digestive tract and excreted in stools. May cause some bloating/gas/full feeling as it behaves like a fiber in many ways. Cost not available yet. FDA cleared in March of 2019 but not available in stores as of March of 2020. Clearance safety and efficacy data done under \"Gelesis\" name. Not appropriate for people with stomach or bowel motility issues as requires you to pass it through digestive tract. [email or web address] has more information.    "

## 2021-06-15 NOTE — PROGRESS NOTES
Armando Domingo is 40 y.o.  male who presents for a billable video visit today.    How would you like to obtain your AVS? MyChart.  If dropped from the video visit, the video invitation should be resent by: Text to cell phone: [phone]  Will anyone else be joining your video visit? No      Video Start Time: 9:14 AM    Provider Notes: Follow up visit following our 1/21/21 Intake for non-surgical weight loss. Starting weight of 370 lbs, BMI 50.2.   Severe AMADEO, currently managed with nothing. States he's sleeping better than ever now. Given his previous AHI of 116 I've reiterated the need to get treatment up and running to both help weight loss and prevent accidents/heart/brain risks.>  Alcohol consumption has gone to zero after nearly 1000kcal/day prior to weight loss season..  Lab work wasn't done as instructed, reminded him to get tested so adjustments to tools can be made appropriately.  Bupropion/naltrexone combination therapy is deferred. Prefers to work without medications for now. Discussed plateaus and if hitting a prolonged one we can use medication at any point, he's declined.   Followed up with dietician and has a RMR of 2617kcal/day and protein goal of 90-110g/day.  346 lbs currently, down 24 lbs. Is more mindful and cutting out alcohol has helped greatly. Monitoring calories/protein. Target of 2000kcal/day (sometimes 1600-1700kcal/day, feels OK). Reviewed over restriction avoidance.   Height: 6' (1.829 m) (1/26/2021  3:00 PM)  Initial Weight: 370 lbs (2/24/2021  9:00 AM)  Weight: (!) 346 lb (156.9 kg) (2/24/2021  9:00 AM)  Weight loss from initial: 24 (2/24/2021  9:00 AM)  % Weight loss: 6.49 % (2/24/2021  9:00 AM)  BMI (Calculated): 49.5 (1/26/2021  3:00 PM)    Plan:  1. Excellent weight reduction of over 6% this past month! A top tier result that comes from the mindful changes you've made.  Continuing aiming for those 1900-2100kcal/day for consistent weight loss and 100g of lean protein daily.  Hydrate well and continue the excellent avoidance of alcohol that has expedited this first chunk of weight loss.    2. Get labs done at your upcoming clinic visit (orders in the system, have [place] clinic call us if they can't see the orders).     3. We can consider medication support if any early and prolonged plateaus occur. Bupropion/Naltrexone would be a good option likely for you. You could see if your insurance covers Liraglutide/Semaglutide as well (injectables daily vs once weekly).     4. Follow up as planned with dietician. Ramping up fiber intake and continuing to hit lean protein goals should be beneficial going forward.    5. Feet limit too much walking so after a warm up on treadmill that's tolerable start some boxing/yoga/weights or other non ambulating aerobic fitness. Ideally, we'd like you to have over 10 minutes at a heart rate over 130 beats per minute, looking to increase 60-90 seconds each week with time spent at that intensity with a goal of eventually spending 35-70 minutes a few days weekly at that intensity by summer.    6. Recheck with me in 6-8 weeks for progress check, sooner if new challenges are throwing things off.    Video-Visit Details    Type of service:  Video Visit    Video End Time (time video stopped): 9:42 AM  Originating Location (pt. Location): Home    Distant Location (provider location):  [facility] [place]     Platform used for Video Visit: [employer]

## 2021-06-16 NOTE — PROGRESS NOTES
Armando Domingo is 40 y.o.  male who presents for a billable video visit today.    How would you like to obtain your AVS? MyChart.  If dropped from the video visit, the video invitation should be resent by: Text to cell phone: [phone]  Will anyone else be joining your video visit? No      Video Start Time: 8:45 am    Provider Notes: Non surgical follow up visit from 21 intake at 370 lbs, BMI 50.2. Was down to 346 in February visit and today reports down to 330 lbs, down 40 lbs. RMR of 2600kcal/day and protein goal of 90-110g/day. Compliance has been excellent, no longer snacking much, rare alcohol but much less than previous. In the past he's declined medications. His severe sleep apnea is feeling better. No regular wakings like previous. Less apneic episodes per wife. Good energy during the day and not having issues staying awake.  End of the day going well.   Treadmill 30 minutes daily every day, has goals to push up. Goes about a mile per session. No pain at the end of the workout a little sweaty.  Contemplating joining his wife on weight watchers.    Plan:  1. Great work on continued mindful eating, you've exceeded 10% weight reduction thus far, a very meaningful result for reducing inflammation, cancer and vascular risks.  2. Recommend following up with your sleep clinic this summer to assess your sleep apnea after weight loss and getting off large amounts of alcohol.  3. With your metabolic rate of just about 2600kcal, continuing on 2000kcal/day plus or minus 100kcal should continue to produce good results with your new walking habits. 90-110g of lean protein daily should nourish your progress well.  4. Labs can be done. Your recent kidneyfunction/A1c are much improved.  5. Hypertension improved from diet change and weight loss, well done on reducing your doses recently as a result.  Video-Visit Details  Medical Decision Makin minutes spent on the date of the encounter doing chart  review, history and exam, documentation and further activities per the note    Type of service:  Video Visit    Video End Time (time video stopped): 9:13 AM  Originating Location (pt. Location): Home    Distant Location (provider location):  [facility]     Platform used for Video Visit: [email or web address]   none

## 2021-06-16 NOTE — PATIENT INSTRUCTIONS - HE
Plan:  1. Great work on continued mindful eating, you've exceeded 10% weight reduction thus far, a very meaningful result for reducing inflammation, cancer and vascular risks.  2. Recommend following up with your sleep clinic this summer to assess your sleep apnea after weight loss and getting off large amounts of alcohol.  3. With your metabolic rate of just about 2600kcal, continuing on 2000kcal/day plus or minus 100kcal should continue to produce good results with your new walking habits. 90-110g of lean protein daily should nourish your progress well.  4. Labs can be done. Your recent kidneyfunction/A1c are much improved.  5. Hypertension improved from diet change and weight loss, well done on reducing your doses recently as a result.

## 2021-06-18 NOTE — PATIENT INSTRUCTIONS - HE
Patient Instructions by Yovani Patel MD at 1/21/2021 10:15 AM     Author: Yovani Patel MD Service: -- Author Type: Physician    Filed: 1/21/2021 12:10 PM Encounter Date: 1/21/2021 Status: Addendum    : Yovani Patel MD (Physician)    Related Notes: Original Note by Yovani Patel MD (Physician) filed at 1/21/2021 12:05 PM       Plan:  1. Welcome to weight loss season. To start, set up follow up with sleep clinic to help management of your severe sleep apnea that is harming you each night. If assistance in scheduling needed, call [phone].  2. Meals should be high in lean protein, aim for 30g/meal every 4-5 hours for now and try to let supper be your last food of the day if possible. Prep veggies for easy grab/go or easy to add a lean protein to for lunch/supper or for a better alternative if crunchy snack is needed.  3. Trial of bupropion/naltrexone:  Start one tab of bupropion in the morning and half a tablet of Naltrexone with Supper (take with food to limit upset stomach feelings). After one week, start taking the two medications together at breakfast and supper. Effect will build over the first 4 weeks. Stop naltrexone if any need for opiate pain medications.  4. Reduce alcohol to no more than once weekly, one drink maximum. If struggling with cessation, reach out and I'm happy to put in a referral to the chemical dependency clinic for resources/options.  Calorically, this change alone should help a 5-10lb weight loss this first 6 weeks.  5. Walking program 4 days weekly. Start w/ 10 minutes if you can. Look into other activities you may enjoy and go for it. Give yourself permission to ease into exercise the first 3 weeks, developing a base to avoid injury as you start to ramp up week 3. Look to add 2-3 minutes weekly to session with a goal of 150-200minutes weekly of moderate aerobic activity and ideally 2 days of 10-20 minutes of strength work using body  "weight/handweights/machines/swimming/bands/pilates or whatever gets your muscles working.  6. Labs can be done anytime. I'll message results once all are back. I anticipate some low vitamin D likely.  7. We'll see how things are going and consider start of metformin at our follow up to address metabolic syndrome. For now, reduction in alcohol and bupropion/naltrexone may be adequate for weight loss/improvements in insulin resistance.       What makes a person succeed with dramatic and sustained weight loss?    It's being at the right point in your life where you feel the need to lose the weight, not because anyone told you so but because of a voice inside of you that says, \"I am ready for this\".  You're now at a point where you may be feeling anxious, irritable and when you look in the mirror you do not recognize the person looking back.  Your healthy self is buried somewhere in that reflexion and you want to free it again.  This is the sort of motivation that leads to success, and it comes from you.    Because the only person that can lose that extra weight is you, I like my patients to focus on the mindset of being in Weight Loss Season.  This gives you permission to make the changes necessary to be consistent with the diet/activity and behavior changes that lead to successful, healthy weight loss.  Nearly any diet plan can work for weight loss, but keeping it healthy and nutrient based to prevent deficiencies/hair loss/fatigue or irritability is vital.  If you have a plan you want to try, we'll work with you to make sure no adjustments are needed to keep you healthy through your weight loss season and working with our Bariatric Dieticians you'll be given expert guidance to customize your diet plan to suit your particular needs. If you don't have your own ideas in mind, we are always happy to suggest well researched and validated plans that provide enough food to prevent hunger but still tap into your excess fat " "reserves and lose weight in a sustainable fashion.  There is great evidence that lean protein/healthy fat intake with good fiber intake while minimizing simple starches/carbs produces reliable/sustainable weight loss in most people. But some feel more connected to an intermittent fasting/fast mimicking or ketogenic diet.  These protocols can be hard for many to stick with and that's why we prefer the protein/healthy fat focused diets but if these alternative strategies appeal to you, we can work with you to optimize your knowledge and results with these tools.    Losing weight is a temporary commitment, but you need to be \"All In\" to have a good weight loss season.  To avoid frustration, you have to be willing to be on track 19 out of 20 days or even better than that. But, weight loss season is generally only 4-8 months in length. After that length of time, it can be hard to maintain a negative calorie balance and our brain, motivations and metabolism will usually bring you to a plateau that cannot be broken in this modern world where other commitments start to take priority. That's when we look to stabilize the weight loss you've achieved.  If you've reached your goal by that time, fantastic, and job well done.  If there is more to go, then after a few months of stabilization, we can usually attack that previous plateau and break into new territory.    Because of this time limitation, we want to really get to work right away and get into a sustainable routine ASAP.  One of the best predictors of how much weight you're going to lose throughout the season is how much you lose in the first 6 weeks, so prepare well and jump in with both feet.      Occasionally, people may feel like they cannot commit fully to the changes necessary and may want to change one thing at a time and \"get used to\" the idea of losing weight.  That is OK because that is where they are in their life, and they cannot fully commit for any number " "of reasons.  It's part of that internal motivation and they just haven't reached PRIORTY NUMBER ONE status yet. It's possible that what they need is more time to reach that point and I am always willing to work with people that want to \"dabble\", but understand, the amount of success obtained with half measures, is much less than half results. Behavior Change cannot occur until we prepare our minds, bodies and environment for what is to come, action!    As you go through your plan, look for things to keep your motivation rolling.  The most successful people have a goal or target/reward that they are working towards.  Having a reward that celebrates your new fitness, mobility and energy is the best sort because it will encourage you to do well with the weight maintenance phase and long term lifestyle changes that promotes keeping the weight off for the long term.  Usually, \"getting healthier\" or improving blood tests or losing weight so your clothes fit better is not as internally motivating as having a tangible reward.  A good weight loss season reward is one that isn't food based, is affordable, but something special:  Something you won't be getting/doing unless you achieve your goal.   Its important to keep to the rule of success:  in order to get the reward, your goal MUST be achieved. Write this reward down, where you can look at it daily and keep it in the front of your mind as you go through your weight loss season and it will help keep you on track.    Tools that help change behavior are vital for success. The most studied and most supported tool for weight loss is nothing more than writing down your food and weight every day.  Every Day.  Accurately and completely.  When you commit to weight loss season, this information tells you whether you're getting ENOUGH food to fuel your weight loss properly as well as teaches you the interaction between different foods you eat and how your body responds with weight " loss.  You'll see that sometimes after a heavy workout you don't see the scale move until 2-3 days later.  How saltier meals (chili for example) may make you retain water for 4-5 days before you see the weight come off, you'll get used to the mini-plateaus that develop after a good 3-8 lb drop in weight as well as how you break through if you keep working the diet as you should.    Weight loss is not a linear process, there are mini ups and downs.  Learning how your body loses weight and getting comfortable with that is very rewarding. The act of writing words on paper also solidifies your will power and commitment to the season of weight loss and that by itself changes your brain chemistry/appetite, motivation and prepares you for maximal success.     Behavior change is all about getting into a new routine.  The old habits and routine have to change because without changing the circumstances of how you gained your weight, it's unlikely you'll enjoy satisfying results. If you have snacking habits, like every time you walk through the kitchen you grab a little something, well, that habit has to change and be replaced by a new habit.  It can be something as simple as keeping a doodle pad on the counter that you make a few scribbles and then walk through the kitchen having not opened the cupboard, or starting with a glass of water and leaving the kitchen without anything else, or checking your food journal to see how many calories you have left for the day.  Boredom is the enemy as are the old habits. Break new ground and try to push those old habits into a deep hole.  There are apps/counseling options available that can help with some of the day to day urges/behaviors if you're struggling. One commercial product that does a good job is [employer].  Unfortunately, there is a subscription fee.    Finally, exercise always helps.  While not mandatory to lose weight, every little bit helps and exercise has so many other benefits  "that to not work it into your plan is to miss out on all the mood, sleep, stress and general health benefits that come from making yourself a little short of breath and sweaty at least 3-4 days out of the week.  The metabolism and calorie burn benefits aside, almost every chronic ailment in medicine gets better with proper, aerobic exercise.  Allow yourself to start slow and let your body prepare itself to accept harder training 4-6 weeks down the road, but start now and commit to a plan.  Whether you have the means to hire a , join a gym or just walk out your front door or go down to your basement for a video workout, get into a exercise routine and  after 3 weeks of at least 3 times a week exercise you should be at a point where you can slowly start ramping up 10% each week to our goal of at least 150-300minutes weekly of aerobic exercise and at least twice weekly resistance training/strenghtening with weights/bands or body weight exercises.     I am a big fan of modifying the free training plan, \"Couch to 5k\", for almost all of my patients. Just type it into [employer] or look it up on your smart phone taylor store.  To modify the plan,  you can use the training plan for whatever aerobic activity you do (bike/treadmill/elliptical/rower/pool/etc). During the \"jog\" intervals, you just move a little faster or harder or increase the tension or incline.  You use those little intervals to switch up the workout and recruit more muscles and pump the blood a little more and then recover again in the \"walk\" intervals by slowing down, decreasing the incline or turning down the tension.  3-4 days a week is not that much to ask and the benefits are enormous.  Start slow and develop the base from which you can then build on and reduce the risk of injury.  It's much more important 2-4 months from now to be enjoying your exercise then it is to over exert yourself at the start and hurt yourself.  Starting slowly allows your body " to accept the training better down the road when the exercise becomes crucial for weight maintenance.  Without exercise down the road during your maintenance phase, all this hard work you are about to put in can be undone. It usually takes about 100-300 calories a day of exercise to maintain a weight loss and our focus during weight loss season is to generate the routine/activities and hobbies that make that enjoyable/sustainable.    Thanks for taking this first and most important step in your weight loss season.  Commit to it and we will cheerlead you all the way to success.  When things get tough or off track we'll offer guidance and analysis and when you reach your goal we'll celebrate your success.  In the end, it is all about your success, your health and what you do with it.      Yovani Patel MD  [facility]  [phone]        LEAN PROTEIN SOURCES  Getting 20-30 grams of protein, 3 meals daily, is appropriate for most people, some need more but more than about 40 grams per meal is not useful.  General rule is drinking one ounce of water per gram of protein eaten over the course of the day:  70 grams of protein each day, drink 70 oz of water.  Protein Source Portion Calories Grams of Protein                           Nonfat, plain Greek yogurt    (10 grams sugar or less) 3/4 cup (6 oz)  12-17   Light Yogurt (10 grams sugar or less) 3/4 cup (6 oz)  6-8   Protein Shake 1 shake 110-180 15-30   Skim/1% Milk or lactose-free milk 1 cup ( 8 oz)  8   Plain or light, flavored soymilk 1 cup  7-8   Plain or light, hemp milk 1 cup 110 6   Fat Free or 1% Cottage Cheese 1/2 cup 90 15   Part skim ricotta cheese 1/2 cup 100 14   Part skim or reduced fat cheese slices 1 ounce 65-80 8     Mozzarella String Cheese 1 80 8   Canned tuna, chicken, crab or salmon  (canned in water)  1/2 cup 100 15-20   White fish (broiled, grilled, baked) 3 ounces 100 21   [place]/Tuna  (broiled, grilled, baked) 3 ounces 150-180 21   Shrimp, Scallops, Lobster, Crab 3 ounces 100 21   Pork loin, Pork Tenderloin 3 ounces 150 21   Boneless, skinless chicken /turkey breast                          (broiled, grilled, baked) 3 ounces 120 21   [place], [place], [place], and Venison 3 ounces 120 21   Lean cuts of red meat and pork (sirloin,   round, tenderloin, flank, ground 93%-96%) 3 ounces 170 21   Lean or Extra Lean Ground Turkey 1/2 cup 150 20   90-95% Lean [place] Burger 1 latosha 140-180 21   Low-fat casserole with lean meat 3/4 cup 200 17   Luncheon Meats                                                        (turkey, lean ham, roast beef, chicken) 3 ounces 100 21   Egg (boiled, poached, scrambled) 1 Egg 60 7   Egg Substitute 1/2 cup 70 10   Nuts (limit to 1 serving per day)  3 Tbsp. 150 7   Nut Beltrami (peanut, almond)  Limit to 1 serving or less daily 1 Tbsp. 90 4   Soy Burger (varies) 1  15   Garbanzo, Black, [place]ensen Beans 1/2 cup 110 7   Refried Beans 1/2 cup 100 7   Kidney and Lima beans 1/2 cup 110 7   Tempeh 3 oz 175 18   Vegan crumbles 1/2 cup 100 14   Tofu 1/2 cup 110 14   Chili (beans and extra lean beef or turkey) 1 cup 200 23   Lentil Stew/Soup 1 cup 150 12   Black Bean Soup 1 cup 175 12         MEDICATIONS FOR WEIGHT LOSS  There are several medications available to assist us in weight loss.  By themselves, without compliance to a change in diet and increase in movement/activity these medications are disappointing in their results. However, combined with a closely monitored program of diet change and exercise they can be very effective in controlling appetite and boosting initial weight loss.  All weight loss medications need continual re-evaluation for efficacy as their side effects and health benefits fail to be worthwhile if a person is not continuing to lose weight or in maintaining their healthy weight.  Some weight loss medications are scheduled drugs, meaning there is at least a  theoretical possibility for developing addiction to them but in practice this is rare.  We do anticipate coming off meds in the future after stabilization of weight loss is assurred.  Finally, a tolerance can develop and peoples perceived efficacy of medication can diminish.  In communication with your physician, it may be appropriate to intermittently take a break from these medications and then restart again (few weeks off then restart again) if a plateau is reached that cannot be broken through.  Each person can respond to a medication differently and to be a good option for you, it will need to be affordable, effective and well tolerated with minimal side effects.    In most cases, weight loss progress after one month and three months will be obtained and if a patient is not reaching the satisfactory progress towards weight loss, the medications may be discontinued.  The thought is that if a person is taking a weight loss medication and not receiving the potential health benefit of that drug, the side effects are not worthwhile and use should be discontinued.  On the flip side, there are many people on some weight loss medications for years because it continues to be an effective tool in their weight management and they are tolerating the medication without any long-term side effects.  Each person's response and purpose will be evaluated.      PHENTERMINE (Adipex): approved in 1959 for appetite suppression.  It has stimulant effects and cannot be used with Ritalin, Concerta, or other stimulants.  Although it is not highly addictive, it's chemically related to amphetamines which are addictive.  Occasional dependence can develop, but rarely. The most common side effects are dry mouth, increased energy and concentration, increased pulse, and constipation.  You should not take phentermine if you have glaucoma, hyperthyroidism, or uncontrolled/untreated hypertension or overly anxious. You should stop if dramatic mood  swings, severe insomnia, palpations, chest pains, visual changes or if your Blood Pressure is consistently elevated or any time it's over 160/90.   It's ok to go off the med for a few weeks and restart if efficacy is wearing off.  $24-$30 for 90 tablets at [facility]. Females are required to have reliable birth control to reduce the risk birth defects/miscarriage.      TOPIRAMATE (Topamax): Anti-seizure medication, also used to prevent migraines and sometimes for mood stabilization.  Side effects include paresthesia, glaucoma, altered concentration, attention difficulties, memory and speech problems, metabolic acidosis, depression, increase in body temperature and decrease sweating, risk of kidney stones.  Do not take Topamax while taking Depakote as this can cause high ammonia levels.  You must have reliable birth control as Topamax can cause birth defects.  If prolonged use has occurred it should be tapered off slowly to avoid withdrawal issues.  Insurance usually covers Topiramate.  At higher doses, there may be some confusion/forgetfulness associated with this so we try to limit dose to under 75mg twice daily to reduce this risk. Often covered by insurance as it's used for many reasons.  Topamax will cause carbonated beverages to taste bad. A recheck of your kidney/electrolytes may occur within a few months of starting.    QSYMIA (Phentermine + Topamax):  See above information about phentermine and Topamax.  Most common side effects are paresthesia, dizziness, distortion of taste, insomnia, constipation, and dry mouth.  See above descriptions for the two individual agents.Females are required to have reliable birth control to reduce the risk birth defects/miscarriage.  $150-$220 per month      Liraglutide (Victoza/Saxenda):   Part of the family of Glucagon Like Peptide Agonists, liraglutide directly suppresses appetite and is often used by diabetic patients due to decrease liver production of glucose,  "thereby improving glucose levels.  It also slows how quickly the stomach empties. May be hard to get covered for non diabetics and is an injectable medication delivered via a injector pen. It can be very costly without insurance coverage (over $500/month).  Small risk for pancreatitis and dose should be held if increased mid abdominal pain/burning. It is not to be used if previous Multiple Endocrine Neoplasia. In rodents, may increase risk of thyroid tumors and not indicated for anyone with hx of medullary thyroid cancer as a result.  If changes in voice/swallowing should be discontinued. Reliable birth control required in women.    Contrave (Bupropion/Naltrexone).    Synergistic combination of a mild appetite suppressing anti-depressant (Bupropion) whose effects are increased due to interaction with Naltrexone.  Naltrexone may have some effects on craving and is often used in addiction medicine to help previous opiate addicts be less prone to relapse as it blocks the action of opiates. Should be stopped if any need for opiate pain medication, surgery or planned procedures where you'll be given sedation/anesthesia. If prolonged use recommend stepping down bupropion over 2-3 weeks to limit any risk of withdrawal issues. Side effects may include dry mouth, increased heart rate, mild elevation in Blood pressure;  dizziness, ringing in the ears, anxiety (typically due to bupropion), nausea, constipation, and some get fatigued with naltrexone.  About $210 on [facility] for 120 tabs of \"Contrave\", the brand name without insurance coverage. Generic Bupropion 75mg: $25 for 120 tabs, Naltrexone: $55 for 90 tabs without insurance coverage on [employer]. Cannot be used if pregnant/trying to conceive or breast feeding.      Plenity:   Recently available October 2020, by mail order pharmacy only, but expensive. $98/month.  2-3 capsules taken 20 minutes before 2 meals daily provides crystals that expand into a gel that provides a " "mechanical fullness. Gel mixes with your next meal and increases satisfaction by bulking the meal up to feel bigger than it truly is. Plenity is not absorbed and gets passed through the digestive tract and excreted in stools. May cause some bloating/gas/full feeling as it behaves like a fiber in many ways. Cost not available yet. FDA cleared in March of 2019 but not available in stores as of March of 2020. Clearance safety and efficacy data done under \"Gelesis\" name. Not appropriate for people with stomach or bowel motility issues as requires you to pass it through digestive tract. [email or web address].com has more information.  Bupropion/Naltrexone Patient Information (trade name, CONTRAVE)    This medication is used for weight loss.  In studies people lost an averaged 5-8% of their starting weight compared to placebo (0-1%).    Often, this medication will be written as 2 separate prescriptions to improve cost to the patient. The trade name drug CONTRAVE comes as a combination of 8mg naltrexone/90mg bupropion and a 3 week escalating dose regimen going from one tablet daily up to a max of 2 tabs twice daily:  Week 1: one tablet in the daytime.  Week 2: one tablet A.M.  And One tablet in the PM.  Week 3: 2 tabs AM  And One tablet in the PM.  Week 4: 2 tabs AM and 2 tabs PM.     The generic Rx I write for is as follows:  I recommend starting One 75 mg bupropion and 1/2 a tablet of naltrexone (25mg) daily for 10 days and then moving up to One 75 mg bupropion tablet twice daily and half a naltrexone tablet twice daily.  Depending on your results/tolerance, we may increase the Bupropion up to a maximum of 150 mg twice daily of the immediate release medication or one Bupropion  mg-300 mg daily. Sometimes we'll have to go slower with the dose escalation.    Like any weight loss medication, showing results and having tolerable or no side effects is vital to continuing therapy and if either no significant weight loss after 8-12 " weeks or too many side effects then we would discontinue therapy and look for alternative options/assistance.    AVOID taking with high fat meals as this increases bupropion levels, but some food in the stomach can help decrease nausea side effects from the Naltrexone.    People who take Metoprolol may need to decrease their dose of metoprolol as the bupropion increases the effect of metoprolol 2-4 fold in some cases and low blood pressure/low heart rate could occur.    Patients should STOP CONTRAVE if they have a severe allergic reaction to CONTRAVE.  Patients should be told that CONTRAVE should be discontinued and not restarted if they experience a seizure while on treatment.    Patients should be advised that the excessive use or abrupt discontinuation of alcohol,benzodiazepines, antiepileptic drugs, or sedatives/hypnotics can increase the risk of seizure.    Patients should be advised to minimize or avoid use of alcohol.    Patients should be advised that if they previously used opioids, they may be more sensitive to lower doses of opioids and at risk of accidental overdose should they use opioids after CONTRAVE treatment is discontinued or temporarily interrupted.  Patients should be advised that because naltrexone, a component of CONTRAVE, can block the effects of opioids, they will not perceive any effect if they attempt to self-administer anyopioid drug in small doses while on CONTRAVE. Further advise patients that the attempt to administer large doses of any opioid or to bypass the blockade while on CONTRAVE may lead to serious injury, coma, or death.    Patients should be off all opioids for a minimum of 7 to 10 days before starting CONTRAVE in order to avoid precipitation of withdrawal. Advise patients they should not take CONTRAVE if they have any symptoms of opioid withdrawal.   Patients should be advised to call their healthcare provider if they experience increased blood pressure or heart  rate.  Patients should be advised to notify their healthcare provider if they are taking, or plan to take, any prescription or over-the-counter drugs. Concern is warranted because CONTRAVE and other drugs may affect each others metabolism.  Patients should be advised to notify their healthcare provider if they become pregnant, intend to become pregnant, or are breastfeeding during therapy.  CONTRAVE should NOT be taken if pregnant or nursing.    Patients with type 2 diabetes mellitus on antidiabetic therapy should be advised to monitor their blood glucose levels and report symptoms of hypoglycemia to their healthcare provider(s).    Patients should be advised to swallow CONTRAVE tablets whole so that the release rate is not altered. Do not chew, divide, or crush tablets if using the Trade drug Contrave, dividing the generic naltrexone is fine.    As always, if any questions/concerns, don't hesitate to call us at the [facility] clinic.    Yovani Patel MD  [phone].  1/21/2021      Exercise Guidance    Nearly everything that bothers us gets better when the proper amount of exercise can be done in the proper amounts.  Getting to that level safely and without injury is the key.  When it comes to weight loss, exercise is especially important in maintaining the weight loss.  Unfortunately, one of the harsh realities is that substantial weight loss slows our metabolism, often anywhere from 5-20%.    Our brain always remembers our heaviest weight and we can return to that if we're not mindful and moving regularly.  Our biology doesn't understand the concept of having too much energy, only not having enough.  As such, when we lose weight, it's thought that the brain interprets this as we're ill or in a famine and dials back our metabolism to limit further weight loss.  This is why exercise is so important in keeping the weight off and is the main reason people have some weight regain from  their low weight point after weight loss.  We have to make up that 10-20% of calories not being burned.Since we can restrict our intake for only so long, exercise becomes very important in our long term healthy weigh maintenance to balance out the occasional indiscretion with our diet.    Generally, for every 5% body weight reduction in a weight loss season, a person needs to add  kilocalories of exercise in their daily routine to keep that weight off for the long term.  This is why it's vital to be starting your fitness regimen during weight loss season, so that routine is well established as you move into your maintenance period.    Additionally, all sorts of good enzymes and genes turn on with exercise and our stress, sleep, mood and bodies feel better when we can get to the point of making ourselves a little sweaty and short of breath 35-50 minutes most days of the week. But we have to start with what we can do first and give ourselves permission to work our way up to this goal.    Who isn't ready for exercise? Well, if you get severe dizziness/palpitations, chest pain or short of breath/faint with even minimal activity like walking across a room or you're having to pause while going up a flight of stairs, then getting your heart and/or lungs fully evaluated prior to starting an exercise regimen is recommended. Everyone else can probably start a program, but everyone may start at a different point:  Some can set a 5-10 minute walking goal and others will be able to ride their bike for an hour.      Start with where you're at and look to add 10% more each week until you're at that 150 minutes or more a week (or 75 minutes/week or more of vigorous exercise). Moderate exercise can be estimated as the pace you can carry on a conversation and vigorous is the pace at which you can get 3-5 words out before having to take a breath.  If you're using heart rate monitoring, Moderate is about 60% of your maximum heart  "rate and vigorous about 75%. (Max heart rate estimated as 220 beats minus your age:  Example: 220-age of 46 =176 Beats per minute (BPM) maximum. 0.6X 176= 105 BPM (moderate), 132 BMP(vigorous)).    If you like to count steps, the 10,000 steps per day does correlate well with weight maintenance but try to make at least 20-25% of those steps at a brisk pace (like you are about to miss your bus).    Finally, if you are pressed for time, it's important to know that some exercise is better than none.  High Intensity Interval training (HIIT) is a good way to get as much out of a short period of working out. If you can't walk, use the stairs, bike or swim; you could use a punching/arm workout regimen for your activity.  The idea with HIIT is to have a 3-6 minute warm up period of low intensity and the 3-6 \"intervals\" where you push the intensity up and then recover and start the next interval. One study showed that 3 intervals of 20 seconds at \"Maximum Effort\" while either biking on a stationary bike or going up stairs and then having 100 seconds recovery time before the next Maximum Effort was equally as beneficial on cardiovascular fitness development as doing 30 minutes of moderately paced walking 3 days weekly over a 6 week period of time.  So intensity matters. You just need to be able to safely do your desired exercise without injury. There are many great HIIT exercises/routines out there. IF you're not doing much exercise currently, I recommend giving your self 2-3 weeks of moderate exercise, 3 days weekly minimum to get your bones/tendons/muscles used to exercise before going for High Intensity workouts.    If you like to use Apps on the phone, the couch to 5k taylor and 7 minute workout apps are nice places to start if you are reasonably healthy.  There are hundreds of other options out there.  Consider viewing [employer] if gentler exercise/movement is desired. Videos on Tho Chi and chair yoga for seniors exist and are " "free. Check them out and let's get that 3-4 days a week routine going.    Let's move!  Yovani Patel MD.       High Intensity Interval Training (HIIT):    To feel our best, our bodies need to move. Our mental health, sleep, memory, immune function, stress coping and metabolic health depend on exercise and its benefits for optimizing how our body works best. In the modern world, most do not get nearly enough exercise.  However, it's important to understand that ANYTHING is better than nothing and if you can get started with a routine for fitness, even a little bit has some benefit compared to none.  The more you do, the better (up to 20 hours weekly, beyond that the benefit tails off), but the NIH guidelines for all adults in [place] is to work up to 150-200 minutes of moderate aerobic activity each week to improve our health.  If you're a person that struggles with time pressure/lack of interest then those 2.5-3 hours weekly may be too much to ask.  So, we have to be very efficient in how we exercise to reap the benefits. It turns out that INTENSITY matters. When we use Vigorous rather than Moderate aerobic activity (Vigorous defined by a heart rate of 70-85% of calculated maximum heart rate; max heart rate equals 220 beats minus your age or the level of effort where you could talk 2-4 words before needing to take a breath), the amount of time required to reap the benefits of exercise is cut in half:  75-90 minutes/week.      A recent study showed that a 10 minute interval workout using a 3-4 minute warm up and then 20 second \"maximum effort\" followed by 1 minute, 40 seconds of recovery and then repeated two more times was as effective in improving fitness as a 30 minute brisk walk.  They utilized exercise bikes or stairs for the effort but you could adapt to whatever geography/gym/pool/bike/hill/staircase that you may have as long as you can safely do the effort without injury.   As your fitness improves, intervals " of 30 seconds to 2 minutes of increased effort followed by 1-2 minutes of recovery are options as well. The fitter you become, the harder and more intervals you'll be able to do.  Start with what you CAN do, not what you WANT to do and that will allow your body to adapt/develop fitness down the road to reach your goals.  Give yourself permission to develop a base of fitness the first 3 weeks and then you should be able to ramp up from there nicely and progressively each week.    Jumping right into a High Intensity Interval workout if you've not been having some level of exercise/fitness recently can increase your risk of injury.  To help develop some base fitness here are some options that would give you a 3-4 week base development, assuming you can walk or ride a stationary bike but haven't been doing much the last few months. 3-4 sessions each week of:      Week Warm up Interval: 3-6 repeats Cooldown   1 3-5 minutes easy walk/spin 20 seconds brisk but doable pace then recover with 90 seconds easy 2-3 minute easy walk/spin   2 3-5 minutes easy walk/spin 25 seconds brisk, 90 seconds recovery 2-3 minute easy walk/spin   3 3-5 minutes easy walk/spin 30 seconds brisk, 90 seconds recovery 2-3 minute easy walk/spin   4 3-5 minutes easy walk/spin 40 seconds brisk, 60 seconds recovery 2-3 minute easy walk/spin     *for base development, keep resistance steady and just  the speed. Once you've completed base phase, feel free to add a little extra resistance to the faster phase to increase vigorousness/heart rate.  During base phase you should be still able to talk comfortable/sing during faster pedaling/walking.     After completing the base phase, start ramping up workouts on the bike/walking. Have one easy pace workout but of longer duration (add 2-3 minutes each week to the time) each week.  One workout weekly should have an interval workout where you push your intensity working up to those 15-30 second maximum  efforts and recovering fully and then repeating for at least 3-4 intervals.  Cool down/easy pedal at the end for 1-2 minutes.     Consider a spin class if you have the means/access and remember, it's OK to rest if needed. Make the workout yours and don't focus on other people's abilities, getting started will develop your own fitness every week.  Jogging plans such as the Couch to 10k or any aerobic training program make use of similar intervals and can help you reach a fitter and more capable body regardless of where/how you perform the intervals (walking/biking/swimming/elliptical/row machine/erg arm machine, peddler, raking, lawn mowing,etc).  Go for it.          On-the-Go Breakfast Ideas  As of 2015, the latest research shows what a huge impact eating breakfast has on losing weight and feeling your best. People lose more weight when they make breakfast their biggest meal of the day compared to Dinner, but even if you cannot go to that degree, getting a breakfast that has at least 20 grams of protein and even a moderate amount of fat is ideal for maintaining good energy through the day and limits overeating in the evening hours.  The following are some quick and easy suggestions for at least getting something of substance into your body in the morning.  Enjoy!    Eating breakfast within 90 minutes of waking up is an important part of taking care of your body.  After sleeping for hours, your body is in need of fuel.  An ideal breakfast is a combination of protein, whole-grain carbohydrates, or fruit.  Heres why:    -Protein digests very slowly in the body, helping you feel more satisfied.  -Whole grains provide dietary fiber, which also digests slowly and helps keep your gut clean.  -Fruit is a great source of vitamins, minerals, and fiber.      Each one of these breakfast combinations has between 200-300 calories and 15-20 grams of protein.  Feel free to mix and match!    Protein: Choose  -1/2 cup low-fat cottage  cheese  -2 hard boiled eggs , or one cooked in olive oil (low/slow heat).  -1 low fat string cheese stick  -1 Tablespoon natural peanut butter  -[facility] vegetarian sausage latosha (found in freezer section)  -1 slice lowfat cheese  -6 oz 2% or lowfat Greek yogurt, such as Fage or Oikos.    PLUS    Whole Grains:  Choose?  -1 whole wheat English muffin  -1 whole wheat sariah, half  -1/2 Fiber One frozen muffin, thawed  -1/2 Fiber One toaster pastry  -1 whole wheat bagel thin  -1/2 cup Kashi cereal  -1 Kashi waffle (or other whole grain high-fiber waffle)    OR    Fruit: Choose  -1/3 cup blueberries  -1/2 banana (or a plantain- similar to a banana, yet smaller)  -1/2 cup cantaloupe cubes  -1 small apple  -1 small orange  -1/2 cup strawberries    *Adapted from Diabetes [employer], Fall 20    Ten Breakfasts Under 250 calories    Ideally, getting between 350-600 calories  (depending on starting height and weight)for breakfast is ideal for avoiding hunger later in the day, adjust/add to the following accordingly:    One- 250 calories, 8.5 g protein  1 slice whole-grain toast   1 Tbsp peanut butter    banana    Two- 250 calories, 8 g protein    cup nonfat/lowfat yogurt  1/3rd cup diced no-sugar peaches  1/3rd cup cereal (like Special K, Cheerios, or bran flakes)    Three- 250 calories, 25 g protein  1 egg scrambled with 1 oz skim milk    cup shredded cheddar    whole grain English muffin  1 oz Malawian henry  1 tsp margarine spread    Four- 225 calories, 25 g protein  1/2 cup Kashi Go-Lean cereal    cup skim milk mixed with 1 scoop Bariatric Advantage protein powder    cup no-sugar diced pears    Five- 250 calories, 20 g protein    cup oatmeal prepared with skim milk, 1 scoop protein powder, and sugar-free maple syrup    Six- 200 calories, 5 g protein  1 whole grain waffle, toasted  1 tablespoon creamy peanut or almond butter    Seven-  250 calories, 19 g protein  Breakfast sandwich: 1 slice whole grain toast, cut in  half.  Add 1 scrambled egg and one slice cheddar  cheese.    Eight-  250 calories, 15 g protein  2 eggs scrambled with 1/3 cup frozen spinach (heat before adding to eggs) and 2 tablespoons low fat cream cheese.    Nine-  150 calories, 15 g protein  2/3rd cup cottage cheese    cup cantaloupe    Ten- 200 calories, 20 g protein  Fruit smoothie made with 4 oz. nonfat Greek yogurt,   cup berries, 1 scoop protein powder, and 4 oz skim milk.    Ten Lunches Under 250 Calories    Aim for lunch to be around 300-400 calories a day when trying to lose weight and get that protein in!    One- 200 calories, 11 g protein  1/3 cup tuna salad made with light elliott on 1 slice whole grain bread  1 small peeled apple    Two- 250 calories, 16 g protein  1/3 cup lowfat cottage cheese    cup cooked green beans    small fruit cocktail (in natural juice)    Three- 200 calories, 11 g protein    grilled cheese sandwich on whole grain bread with lowfat cheese  2/3rd cup of tomato soup    Four- 250 calories, 22 g protein  Deli wrap: 1 oz sliced turkey, 1 oz sliced ham, 1 oz sliced chicken rolled up with 1 slice low-fat cheese  1 small orange    Five- 250 calories, 28 g protein  2/3rd cup chili with 1 oz shredded cheese  4 saltine crackers    Six- 250 calories, 22 g protein  1 cup fresh spinach with 2 oz chicken, 1/3rd cup mandarin oranges, and 2 tablespoons sliced almonds with 1 tablespoon light vinaigrette dressing    Seven- 200 calories, 11 g protein  1 Tbsp sugar-free preserves and 1 Tbsp peanut butter on 1 slice whole grain toast    cup nonfat/lowfat Greek yogurt    Eight- 250 calories, 18 g protein  1 small soft-shell chicken taco with 1 oz shredded cheese, lettuce, tomato, salsa, and 1 Tbsp light sour cream    cup black beans    Nine- 225 calories, 13 g protein  2 ounces baked chicken  1/4 cup mashed potatoes    cup green beans    Ten- 200 calories, 21 g protein  Deli sariah: 2 oz roast beef or other deli meat with 1 tsp Gildardo mayonnaise and  sliced tomato, onion, and lettuce  1/3rd cup cottage cheese      Ten Dinners Under 300 calories    If you're eating a large breakfast and medium lunch, keep dinner small.  300-400 calories is ideal for most people depending on their caloric needs.    One- 300 calories, 12 g protein  1-inch thick slice of turkey meatloaf    cup baked butternut squash    Two- 200 calories, 9 g protein  Bread-less BLT: 3 slices turkey henry, sliced tomato, wrapped in a large lettuce leaf    cup peeled fruit    Three- 275 calories, 36 g protein  3 oz roasted chicken    cup cooked broccoli    cup shredded cheddar cheese    cup unsweetened applesauce    Four- 200 calories, 25 g protein  3 oz baked tilapia  1/3rd cup cooked carrots    cup yogurt    Five- 250 calories, 20 g protein  Grilled ham n Swiss: spread 2 tsp light margarine on 1 slice of whole grain bread.  Cut bread in half, layer 2 oz deli ham with 1 piece of Swiss cheese and grill until cheese is melted.    cup cooked vegetables    Six- 250 calories, 18 g protein  Vegetarian cheeseburger: 1 Boca cheeseburger topped with lettuce, onion, tomato, and ketchup/mustard    cup sweet potato fries    Seven- 250 calories, 18 g protein  Pork pot roast: 2 oz roasted pork loin, 1/3rd cup roasted carrots,   medium potato, cooked with   cup gravy    Eight- 300 calories, 25 g protein  2 oz meatballs (about 2 small meatballs)    cup spaghetti sauce  1/2 piece toast topped with 1 tsp light margarine and topped with garlic powder, toasted in oven    Nine- 250 calories, 16 g protein  Mexican pizza: one 8 corn tortilla topped with 2 oz chicken,   cup salsa, 2 tablespoons black beans, 2 tablespoons shredded cheese.  Bake until cheese is melted.    Ten- 250 calories, 22 g protein  Shrimp stir-singh: 3 oz cooked shrimp, 1/6th onion,   pepper,   cup chopped carrots sautéed in 1 tablespoon olive oil, topped with 2 tablespoons stir singh sauce and a pinch of sesame seeds      The Frozen Food Diet  For those on  "the go who may have relied heavily on fast food in the past, we want to break away from that routine.  But life sometimes may limit our time for shopping/cooking or perhaps your skills in the kitchen are limited.  To help get on the right food, here are some options for using frozen food/reheatable food that may keep you on track with protein/caloric goals and keep things simple as you first jump into weight loss season or look to a break from your current meal routine.     The listed foods are examples of what may help keep most on track but your stores may carry different options.  Start reading labels!  As long as your frozen meals have 18-30 grams of protein per meal they should do the job. Experience suggests most of these meals will be around 280-400 calories.   The protein content is the most improtant. For those who are salt sensitive, looking at sodium content is important as well and most of us would do well to keep our daily sodium intake under 2000-2300mg anyway.   My suggestion is to find your favorite 2 frozen meals that fit the protein goal, load up, keep it simple and use them regularly 2 meals daily. A piece of fruit (apple/berries/banana) in the AM between breakfast and lunch and trying not to go more than about 5 hours between your meals should help keep things on track. For heavy exercisers or people with higher resting metabolic rates, you may feel better w/ a protein supplement in mid afternoon.  Try to let supper be your last food of the day and stick to water otherwise. If you absolutely need bubbly beverages, carbonated ruggiero such as Lecroix/Schofield/Bubbly/etc (\"essenced water\" without sugar/artificial sweetener) may be refreshing options as well (much better if cold).     Your grocery may carry better/different options then what is listed,  most of those are decent options, just look at the labels to make sure.      EXAMPLES of quick/frozen meals/sides:  Healthy Choice Power Bowls:  " Chicken Feta and Farro:  ~$4.00. 310 kcal. Protein 23 grams. 600mg sodium, Fiber 6g. Sugar 2g.   Evol Fire Grilled steak bowls: $4.49. 400 kcal, 20g protein, 520mg sodium  fiber 8, sugar 3.    Evol Chicken Enchilada bake bowl: $4.49. 350 kcal, 21 g protein, 610mg sodium, fiber 7, sugar 3    Frontera Chicken Fajita bowl: $4.99 on sale $3.59. 260 kcal, 22 g protein, 700mg sodium, fiber 8, sugar 8.    Frontera, Green chili grilled chicken taco bowl $4.99 on sale $3.59. 240 kcal, protein 20g, sodium 710, fiber 6g, sugar 6g.    Frontera chicken and chorizo taco bowl. $4.99 on sale $3.59. 290 kcal, 19g protein. 660 mg sodium, 7g fiber, 6 g sugar.    Jose Alfredolibby Cheyjennifer, Chicken: soy based food. $4.99 for 4 patties. If using for protein source, encourage 2 patties, 280 kcal, 24 g protein 740mg sodium, fiber 6g, sugar 4g. (add to veggie mix for meal gets 310 kcal if 3/4 cup of mediterranean veggie mix).  Feel free to use condiments like ketchup/mustard or salsa.    Daria Petersonf, chicken ivory: $4.99, $3.59 on sale. 320 kcal, 20g protein, 750mg sodium, 3g fiber, 4 g sugar    eggs: $2.99-$6.99 per dozen. 70 kcal per egg, 6 -7g protein per egg.    365 Meditarrean blend frozen veggies (5 servings of 3/4 cup): $2.69. 25kcal    365 organic broccoli florets. $2.69 4.5 servings of 1 cup. 30 kcal.      Mozarella cheese: sticks 1 oz (6 pack is $3.60 on sale if organic) 80 kcal, 8 g protein,     Salsa: 365 salsa craig sierra chil: $2.99 per bottle, 15 servings) 2 tablespoons 10 kcal, 0 protein, 200mg sodium. fiber 0, sugar 1    Water, coffee/unsweetened tea only.  Get at least 64 oz daily. Maximum safe amount is generally up to one half of your body weight in pounds in most cases (unless you're on water restriction for cardiac issues).  Fruit: apples, berries, no more than one banana daily, grapes, watermelon/cantaloupe. Keep it to 1-2 servings daily. Serving of berries is a generous handful. Same with grapes.  Melon:  2 good sized wedges.   "Wash your berries/grapes/apples before eating.  Depending on your protein and caloric needs, a person could use frozen meals 2-5 times daily. If you're 6 feet 300 lb man,  you'll likely need an extra serving or two compared to a 5 foot, 200 lb women.  Follow your appetite, hunger and energy levels and how they relate to your intake and timing of meals. Weigh yourself daily if possible.  Have a great season!            Bariatric Surgery Seminar Information:     Your health and weight history qualifies for consideration of our surgical weight loss program. We recommend you contact your insurance provider to make sure you don't have a \"plan exclusion\" for bariatric surgery, in which case we'd be unable to pursue this tool for you.  Modern bariatric surgery offers a very strong tool to reduce excess weight and keep it off for the long term, but it is a tool and like any tool it needs to be used the right way for optimal results. To learn more about the surgical tool, as well as additional non surgical tools/options (24 week program, meal replacements, etc), you can view our online seminar in the privacy of your own home by going to our website and scrolling down to the [address] seminar video:     Website:  [email or web address]    Once completed, there is a short quiz and it should trigger a phone call from our clinic in the following days to see if you're interested in pursuing the surgical program which would require a full hour intake visit to enroll/evaluate your suitability for the program.  If you have an interest in the live, in person seminar, upcoming dates are listed on the web site.  As always if questions arise, don't hesitate to give us a call at [phone].         "

## 2021-06-23 ENCOUNTER — OFFICE VISIT (OUTPATIENT)
Dept: FAMILY MEDICINE | Facility: CLINIC | Age: 41
End: 2021-06-23
Payer: COMMERCIAL

## 2021-06-23 VITALS
HEART RATE: 78 BPM | HEIGHT: 72 IN | DIASTOLIC BLOOD PRESSURE: 83 MMHG | OXYGEN SATURATION: 99 % | WEIGHT: 315 LBS | SYSTOLIC BLOOD PRESSURE: 131 MMHG | BODY MASS INDEX: 42.66 KG/M2 | TEMPERATURE: 97.1 F

## 2021-06-23 DIAGNOSIS — E66.01 MORBID OBESITY (H): ICD-10-CM

## 2021-06-23 DIAGNOSIS — H43.393 VITREOUS FLOATERS OF BOTH EYES: ICD-10-CM

## 2021-06-23 DIAGNOSIS — I10 HYPERTENSION GOAL BP (BLOOD PRESSURE) < 140/90: ICD-10-CM

## 2021-06-23 DIAGNOSIS — H93.8X3 PRESSURE SENSATION IN BOTH EARS: Primary | ICD-10-CM

## 2021-06-23 PROCEDURE — 99214 OFFICE O/P EST MOD 30 MIN: CPT | Performed by: PREVENTIVE MEDICINE

## 2021-06-23 RX ORDER — LORATADINE 10 MG/1
10 TABLET ORAL DAILY
COMMUNITY
End: 2022-05-05

## 2021-06-23 ASSESSMENT — MIFFLIN-ST. JEOR: SCORE: 2450.33

## 2021-06-23 ASSESSMENT — PAIN SCALES - GENERAL: PAINLEVEL: NO PAIN (0)

## 2021-06-23 NOTE — PROGRESS NOTES
Assessment & Plan     Pressure sensation in both ears  -not better with Loratadine 10 mg   -start using over the counter Flonase nasal spray 1-2 sprays in each nostril daily for at least 2 weeks  -please see ENT fir further evaluation   - OTOLARYNGOLOGY REFERRAL    Vitreous floaters of both eyes  -due for comprehensive eye exam   - EYE ADULT REFERRAL    Hypertension goal BP (blood pressure) < 140/90  -at goal  -no episodes of dizziness or low blood pressure     Morbid obesity (H)  -followed by the Weight clinic  Wt Readings from Last 2 Encounters:   06/23/21 149.2 kg (329 lb)   04/08/21 (!) 153.3 kg (338 lb)         25 minutes spent on the date of the encounter doing chart review, history and exam, documentation and further activities per the note         Return in about 2 weeks (around 7/7/2021) if symptoms worsen or fail to improve to see ENT     Nathalie Castro MD MPH    [facility] AIMEE Roberts is a 40 year old who presents for the following health issues     HPI     Hypertension Follow-up      Do you check your blood pressure regularly outside of the clinic? Yes     Are you following a low salt diet? Yes    Are your blood pressures ever more than 140 on the top number (systolic) OR more   than 90 on the bottom number (diastolic), for example 140/90? No  No dizziness     Had been having some low readings with the weight loss that he has had, better since medication adjustments were made.       Ear pressure:  -symptoms for 6 months  -better in the morning, more at the end of the day  -No pain  -just feels pressure on the ears  -when he breathes in through his nose he feels a sucking in sensation  -no drainage  -no bleeding  -more eye floaters, last eye exam   -no hearing changes  -no ear surgeries  -no seasonal allergies in the past  -OTC Claritin not helping  -No tinnitus  -no fever  -no wheezing  -no shortness of breath       AMADEO:  -not using CPAP     Weight:  -followed by  the weight clinic     Review of Systems   Constitutional, HEENT, cardiovascular, pulmonary, gi and gu systems are negative, except as otherwise noted.      Objective    /83 (BP Location: Left arm, Patient Position: Chair, Cuff Size: Adult Regular)   Pulse 78   Temp 97.1  F (36.2  C) (Tympanic)   Ht 1.829 m (6')   Wt 149.2 kg (329 lb)   SpO2 99%   BMI 44.62 kg/m    Body mass index is 44.62 kg/m .  Physical Exam   GENERAL APPEARANCE: healthy, alert and no distress  EYES: Eyes grossly normal to inspection and conjunctivae and sclerae normal  HENT: nose and mouth without ulcers or lesions, Intact TMs, good light reflex, no erythema of the TMs.   NECK: no adenopathy and trachea midline and normal to palpation  RESP: lungs clear to auscultation - no rales, rhonchi or wheezes  CV: regular rates and rhythm, normal S1 S2  SKIN: no suspicious lesions or rashes  NEURO: Normal strength and tone, mentation intact and speech normal  PSYCH: mentation appears normal

## 2021-06-25 ENCOUNTER — OFFICE VISIT (OUTPATIENT)
Dept: OTOLARYNGOLOGY | Facility: CLINIC | Age: 41
End: 2021-06-25
Payer: COMMERCIAL

## 2021-06-25 ENCOUNTER — OFFICE VISIT (OUTPATIENT)
Dept: AUDIOLOGY | Facility: CLINIC | Age: 41
End: 2021-06-25
Payer: COMMERCIAL

## 2021-06-25 VITALS
HEART RATE: 67 BPM | DIASTOLIC BLOOD PRESSURE: 84 MMHG | RESPIRATION RATE: 18 BRPM | OXYGEN SATURATION: 97 % | SYSTOLIC BLOOD PRESSURE: 122 MMHG

## 2021-06-25 DIAGNOSIS — H93.8X2 PLUGGED FEELING IN EAR, LEFT: Primary | ICD-10-CM

## 2021-06-25 DIAGNOSIS — H93.8X3 PRESSURE SENSATION IN EAR, BILATERAL: Primary | ICD-10-CM

## 2021-06-25 PROCEDURE — 99207 PR NO CHARGE LOS: CPT | Performed by: AUDIOLOGIST

## 2021-06-25 PROCEDURE — 92550 TYMPANOMETRY & REFLEX THRESH: CPT | Performed by: AUDIOLOGIST

## 2021-06-25 PROCEDURE — 92557 COMPREHENSIVE HEARING TEST: CPT | Performed by: AUDIOLOGIST

## 2021-06-25 PROCEDURE — 99243 OFF/OP CNSLTJ NEW/EST LOW 30: CPT | Performed by: OTOLARYNGOLOGY

## 2021-06-25 RX ORDER — FLUTICASONE PROPIONATE 50 MCG
1 SPRAY, SUSPENSION (ML) NASAL DAILY
COMMUNITY
End: 2022-05-05

## 2021-06-25 NOTE — PROGRESS NOTES
"AUDIOLOGY REPORT:    Patient was referred from ENT by Dr. Ricci for audiology evaluation. The patient reports ear pressure that is worse in the left ear, though he sometimes also has a sensation of \"sucking in\" of the right ear when breathing through his nose. The patient reports some concerns about hearing, but he has not had any recent changes. The patient reports bilateral tinnitus that is only noticeable in quiet. 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 The patient reports that he does not have ear pain, a history of ear problems or ear surgery, or a family history of hearing loss.     Testing:    Otoscopy:   Otoscopic exam indicates ears are clear of cerumen bilaterally     Tympanograms:    RIGHT: normal eardrum mobility     LEFT:   normal eardrum mobility    Reflexes (reported by stimulus ear):  RIGHT: Ipsilateral is present at normal levels  RIGHT: Contralateral is present at normal levels  LEFT:   Ipsilateral is present at normal levels  LEFT:   Contralateral is present at normal levels    Thresholds:   Pure Tone Thresholds assessed using conventional audiometry with good reliability from 250-8000 Hz bilaterally using insert earphones and circumaural headphones     RIGHT:  normal hearing sensitivity at all tested frequencies     LEFT:    normal hearing sensitivity at all tested frequencies     Speech Reception Threshold:    RIGHT: 10 dB HL    LEFT:   10 dB HL  Results are in agreement with pure tone average.     Word Recognition Score:     RIGHT: 100% at 50 dB HL using NU-6 recorded word list.    LEFT:   100% at 50 dB HL using NU-6 recorded word list.    Discussed results with the patient.     Patient was returned to ENT for follow up.     Tenzin Hemphill, CCC-A  Licensed Audiologist #[id number]  6/25/2021    "

## 2021-06-25 NOTE — PROGRESS NOTES
I am seeing this patient in consultation for pressure sensation both ears at the request of the provider Dr. Nathalie Castro.    Chief Complaint - plugged ear    History of Present Illness - Armando Domingo is a 40 year old male who presents to me today with pressure or a plugged feeling in the ears, but mostly left ear.  It has been present and noticeable for approximately 6 months. It sometimes spread to side of head. Some congestion. There is no history of chronic ear disease or ear surgery. The patient has tried popping ears, claritin, flonase, but these things have not helped. I personally reviewed the relevant clinical notes in Epic including the primary care providers note.     Past Medical History -   Patient Active Problem List   Diagnosis     Hypertension goal BP (blood pressure) < 140/90     Hyperlipidemia LDL goal <130     Hepatic steatosis     Alcohol dependence, continuous (H)     AMADEO (obstructive sleep apnea) Severe     Restless leg     Prediabetes     Microalbuminuria     Morbid obesity with BMI of 45.0-49.9, adult (H)       Current Medications -   Current Outpatient Medications:      aspirin 81 MG tablet, Take 1 tablet by mouth daily., Disp: 30 tablet, Rfl: 3     Blood Pressure Monitor KIT, Use to check blood pressure daily, Disp: 1 kit, Rfl: 0     lisinopril (ZESTRIL) 20 MG tablet, Take 1 tablet (20 mg) by mouth daily Dose change, Disp: 90 tablet, Rfl: 3     loratadine (CLARITIN) 10 MG tablet, Take 10 mg by mouth daily, Disp: , Rfl:     Allergies - No Known Allergies    Social History -   Social History     Socioeconomic History     Marital status:      Spouse name: Not on file     Number of children: Not on file     Years of education: Not on file     Highest education level: Not on file   Occupational History     Not on file   Social Needs     Financial resource strain: Not on file     Food insecurity     Worry: Not on file     Inability: Not on file     Transportation needs     Medical: Not  on file     Non-medical: Not on file   Tobacco Use     Smoking status: Former Smoker     Packs/day: 0.10     Years: 15.00     Pack years: 1.50     Types: Cigarettes     Start date: 1996     Quit date: 2013     Years since quittin.4     Smokeless tobacco: Never Used   Substance and Sexual Activity     Alcohol use: Yes     Alcohol/week: 40.0 standard drinks     Types: 40 Standard drinks or equivalent per week     Comment: advised cutting down 7/3/17     Drug use: No     Sexual activity: Yes     Partners: Female     Comment: none   Lifestyle     Physical activity     Days per week: Not on file     Minutes per session: Not on file     Stress: Not on file   Relationships     Social connections     Talks on phone: Not on file     Gets together: Not on file     Attends Jewish service: Not on file     Active member of club or organization: Not on file     Attends meetings of clubs or organizations: Not on file     Relationship status: Not on file     Intimate partner violence     Fear of current or ex partner: Not on file     Emotionally abused: Not on file     Physically abused: Not on file     Forced sexual activity: Not on file   Other Topics Concern     Parent/sibling w/ CABG, MI or angioplasty before 65F 55M? Not Asked   Social History Narrative     Not on file       Family History -   Family History   Problem Relation Age of Onset     Diabetes Mother      Hypertension Mother      Cancer - colorectal Maternal Grandfather      Diabetes Paternal Grandmother      Cerebrovascular Disease Paternal Grandmother      C.A.D. Father      Snoring Father      Asthma No family hx of      Breast Cancer No family hx of      Prostate Cancer No family hx of      Thyroid Disease No family hx of      Lipids No family hx of        Review of Systems - As per HPI and PMHx, otherwise 7 system review of the head and neck is negative.    Physical Exam  /84   Pulse 67   Resp 18   SpO2 97%   General - The patient is  nontoxic, in no distress.  Alert and oriented to person and place, answers questions and cooperates with examination appropriately.   Voice and Breathing - The patient was breathing comfortably without the use of accessory muscles. There was no wheezing, stridor, or stertor.  The patients voice was clear and strong.  Ears - both ears look normal. Some hairs left side that I removed.  Otherwise the tympanic membranes are intact.  No middle ear effusion.  No acute infection.  Nose - Septum midline. Turbinates normal size. Airway patent bilaterally. No polyps, masses, or pus.   Eyes - Extraocular movements intact. Sclera were not icteric or injected.  Mouth - Examination of the oral cavity showed pink, healthy mucosa. No lesions or ulcerations noted.  The tongue was mobile and midline.  He does have his wisdom teeth mostly in place.  Throat - The walls of the oropharynx were smooth, symmetric, and had no lesions or ulcerations.  The uvula was midline on elevation.    Neck - Soft, non-tender. Palpation of the occipital, submental, submandibular, internal jugular chain, and supraclavicular nodes did not demonstrate any abnormal lymph nodes or masses. No parotid masses. Palpation of the thyroid was soft and smooth, with no nodules or goiter appreciated.  The trachea was mobile and midline.  Neurological - Cranial nerves 2 through 12 were grossly intact. House-Brackmann grade 1 out of 6 bilaterally.      Audiologic Studies - An audiogram and tympanogram were performed today as part of the evaluation and personally reviewed. The tympanogram shows a type A, low volume on the both sides.  Normal hearing.      Assessment and Plan - Armando Domingo is a 40 year old male who presents to me today with left ear plugging and pressure.  His ear exam is normal.  I think this is a referred symptom as he also sometimes points to the side of his head.  It could be TMJ, his wisdom teeth, or form of headache.  We discussed TMJ precautions,  he can see a dentist, check if he grinds his teeth at night with his wife, if all this fails we could treat him for headaches.    Nitin Ricci MD  Otolaryngology  [facility]

## 2021-06-25 NOTE — LETTER
6/25/2021         RE: Armando Domingo  [id number] [address] [id number]        Dear Colleague,    Thank you for referring your patient, Armando Domingo, to the [facility]. Please see a copy of my visit note below.    I am seeing this patient in consultation for pressure sensation both ears at the request of the provider Dr. Nathalie Castro.    Chief Complaint - plugged ear    History of Present Illness - Armando Domingo is a 40 year old male who presents to me today with pressure or a plugged feeling in the ears, but mostly left ear.  It has been present and noticeable for approximately 6 months. It sometimes spread to side of head. Some congestion. There is no history of chronic ear disease or ear surgery. The patient has tried popping ears, claritin, flonase, but these things have not helped. I personally reviewed the relevant clinical notes in Epic including the primary care providers note.     Past Medical History -   Patient Active Problem List   Diagnosis     Hypertension goal BP (blood pressure) < 140/90     Hyperlipidemia LDL goal <130     Hepatic steatosis     Alcohol dependence, continuous (H)     AMADEO (obstructive sleep apnea) Severe     Restless leg     Prediabetes     Microalbuminuria     Morbid obesity with BMI of 45.0-49.9, adult (H)       Current Medications -   Current Outpatient Medications:      aspirin 81 MG tablet, Take 1 tablet by mouth daily., Disp: 30 tablet, Rfl: 3     Blood Pressure Monitor KIT, Use to check blood pressure daily, Disp: 1 kit, Rfl: 0     lisinopril (ZESTRIL) 20 MG tablet, Take 1 tablet (20 mg) by mouth daily Dose change, Disp: 90 tablet, Rfl: 3     loratadine (CLARITIN) 10 MG tablet, Take 10 mg by mouth daily, Disp: , Rfl:     Allergies - No Known Allergies    Social History -   Social History     Socioeconomic History     Marital status:      Spouse name: Not on file     Number of children: Not on file     Years of education: Not on file      Highest education level: Not on file   Occupational History     Not on file   Social Needs     Financial resource strain: Not on file     Food insecurity     Worry: Not on file     Inability: Not on file     Transportation needs     Medical: Not on file     Non-medical: Not on file   Tobacco Use     Smoking status: Former Smoker     Packs/day: 0.10     Years: 15.00     Pack years: 1.50     Types: Cigarettes     Start date: 1996     Quit date: 2013     Years since quittin.4     Smokeless tobacco: Never Used   Substance and Sexual Activity     Alcohol use: Yes     Alcohol/week: 40.0 standard drinks     Types: 40 Standard drinks or equivalent per week     Comment: advised cutting down 7/3/17     Drug use: No     Sexual activity: Yes     Partners: Female     Comment: none   Lifestyle     Physical activity     Days per week: Not on file     Minutes per session: Not on file     Stress: Not on file   Relationships     Social connections     Talks on phone: Not on file     Gets together: Not on file     Attends Evangelical service: Not on file     Active member of club or organization: Not on file     Attends meetings of clubs or organizations: Not on file     Relationship status: Not on file     Intimate partner violence     Fear of current or ex partner: Not on file     Emotionally abused: Not on file     Physically abused: Not on file     Forced sexual activity: Not on file   Other Topics Concern     Parent/sibling w/ CABG, MI or angioplasty before 65F 55M? Not Asked   Social History Narrative     Not on file       Family History -   Family History   Problem Relation Age of Onset     Diabetes Mother      Hypertension Mother      Cancer - colorectal Maternal Grandfather      Diabetes Paternal Grandmother      Cerebrovascular Disease Paternal Grandmother      C.A.D. Father      Snoring Father      Asthma No family hx of      Breast Cancer No family hx of      Prostate Cancer No family hx of      Thyroid  Disease No family hx of      Lipids No family hx of        Review of Systems - As per HPI and PMHx, otherwise 7 system review of the head and neck is negative.    Physical Exam  /84   Pulse 67   Resp 18   SpO2 97%   General - The patient is nontoxic, in no distress.  Alert and oriented to person and place, answers questions and cooperates with examination appropriately.   Voice and Breathing - The patient was breathing comfortably without the use of accessory muscles. There was no wheezing, stridor, or stertor.  The patients voice was clear and strong.  Ears - both ears look normal. Some hairs left side that I removed.  Otherwise the tympanic membranes are intact.  No middle ear effusion.  No acute infection.  Nose - Septum midline. Turbinates normal size. Airway patent bilaterally. No polyps, masses, or pus.   Eyes - Extraocular movements intact. Sclera were not icteric or injected.  Mouth - Examination of the oral cavity showed pink, healthy mucosa. No lesions or ulcerations noted.  The tongue was mobile and midline.  He does have his wisdom teeth mostly in place.  Throat - The walls of the oropharynx were smooth, symmetric, and had no lesions or ulcerations.  The uvula was midline on elevation.    Neck - Soft, non-tender. Palpation of the occipital, submental, submandibular, internal jugular chain, and supraclavicular nodes did not demonstrate any abnormal lymph nodes or masses. No parotid masses. Palpation of the thyroid was soft and smooth, with no nodules or goiter appreciated.  The trachea was mobile and midline.  Neurological - Cranial nerves 2 through 12 were grossly intact. House-Brackmann grade 1 out of 6 bilaterally.      Audiologic Studies - An audiogram and tympanogram were performed today as part of the evaluation and personally reviewed. The tympanogram shows a type A, low volume on the both sides.  Normal hearing.      Assessment and Plan - Armando Domingo is a 40 year old male who presents  to me today with left ear plugging and pressure.  His ear exam is normal.  I think this is a referred symptom as he also sometimes points to the side of his head.  It could be TMJ, his wisdom teeth, or form of headache.  We discussed TMJ precautions, he can see a dentist, check if he grinds his teeth at night with his wife, if all this fails we could treat him for headaches.    Nitin Ricci MD  Otolaryngology  [facility]        Again, thank you for allowing me to participate in the care of your patient.        Sincerely,        Nitin Ricci MD

## 2021-09-26 ENCOUNTER — HEALTH MAINTENANCE LETTER (OUTPATIENT)
Age: 41
End: 2021-09-26

## 2021-10-04 ENCOUNTER — E-VISIT (OUTPATIENT)
Dept: URGENT CARE | Facility: CLINIC | Age: 41
End: 2021-10-04
Payer: COMMERCIAL

## 2021-10-04 DIAGNOSIS — Z20.822 CLOSE EXPOSURE TO 2019 NOVEL CORONAVIRUS: Primary | ICD-10-CM

## 2021-10-04 PROCEDURE — 99421 OL DIG E/M SVC 5-10 MIN: CPT | Performed by: FAMILY MEDICINE

## 2021-10-04 NOTE — PATIENT INSTRUCTIONS
"  Dear Armando Domingo,    Based on your exposure to COVID-19 (coronavirus), we would like to test you for this virus. I have placed an order for this test.The best time for testing is 5-7 days after the exposure.    How to schedule:  Go to your [facility] home page and scroll down to the section that says  You have an appointment that needs to be scheduled  and click the large green button that says  Schedule Now  and follow the steps to find the next available opening.     If you are unable to complete these [facility] scheduling steps, please call [phone] to schedule your testing.     Return to work/school/ guidance:   For people with high risk exposures outside the home    Please let your workplace manager and staffing office know when your quarantine ends.     We can not give you an exact date as it depends on the information below. You can calculate this on your own or work with your manager/staffing office to calculate this. (For example if you were exposed on 10/4, you would have to quarantine for 14 full days. That would be through 10/18. You could return on 10/19.)    Quarantine Guidelines:  Patients (\"contacts\") who have been in close prolonged contact of an infected person(s) (within six feet for at least 15 minutes within a 24 hour period), and remain asymptomatic should enter quarantine based on the following options:    14-day quarantine period (this remains the CDC recommendation for the greatest protection against spread of COVID-19) OR    Minimum 7-day quarantine with negative RT-PCR test collected on day 5 or later OR    10-day quarantine with no test  Quarantine Guideline exceptions are as follows:    People who have been fully vaccinated do not need to quarantine if the exposure was at least 2 weeks after the last vaccination. This includes vaccinated health care workers.    Not fully vaccinated and unvaccinated Individuals who work in health care, congregate care, or congregate living " should be off work for 14 days from their last date of exposure. Community activities for this group can be resumed based on options above. Fully vaccinated individuals in this group do not need to quarantine from work after exposure.    Not fully vaccinated and unvaccinated people whose high-risk exposure was a household member should always quarantine for 14 days from their last date of exposure. Fully vaccinated people in this category do not need to quarantine.    Not fully vaccinated or unvaccinated residents of congregate care and congregate living settings should always quarantine for 14 days from their last date of exposure. Fully vaccinated residents do not need to quarantine.  Note: If you have ongoing exposure to the covid positive person, this quarantine period may be more than 14 days. (For example, if you are continued to be exposed to your child who tested positive and cannot isolate from them, then the quarantine of 7-14 days can't start until your child is no longer contagious. This is typically 10 days from onset of the child's symptoms. So the total duration may be 17-24 days in this case.)    You should continue symptom monitoring until day 14 post-exposure. If you develop signs or symptoms of COVID-19, isolate and get tested (even if you have been tested already).    How to quarantine:   Stay home and away from others. Don't go to school or anywhere else. Generally quarantine means staying home from work but there are some exceptions to this. Please contact your workplace.  No hugging, kissing or shaking hands.  Don't let anyone visit.  Cover your mouth and nose with a mask, tissue or washcloth to avoid spreading germs.  Wash your hands and face often. Use soap and water.    What are the symptoms of COVID-19?  The most common symptoms are cough, fever and trouble breathing. Less common symptoms include headache, body aches, fatigue (feeling very tired), chills, sore throat, stuffy or runny nose,  diarrhea (loose poop), loss of taste or smell, belly pain, and nausea or vomiting (feeling sick to your stomach or throwing up).  After 14 days, if you have still don't have symptoms, you likely don't have this virus.  If you develop symptoms, follow these guidelines.  If you're normally healthy: Please start another eVisit.  If you have a serious health problem (like cancer, heart failure, an organ transplant or kidney disease): Call your specialty clinic. Let them know that you might have COVID-19.    Where can I get more information?  [facility] - About COVID-19: [email or web address]  CDC - What to Do If You're Sick: www.cdc.gov/coronavirus/2019-ncov/about/steps-when-sick.html  CDC - Ending Home Isolation: www.cdc.gov/coronavirus/2019-ncov/hcp/disposition-in-home-patients.html  CDC - Caring for Someone: www.cdc.gov/coronavirus/2019-ncov/if-you-are-sick/care-for-someone.html  [facility] clinical trials (COVID-19 research studies): [email or web address]-clinical-trials  Below are the COVID-19 hotlines at the [place] [facility] ([facility]). Interpreters are available.  For health questions: Call 138-009-8647 or 1-458.840.2472 (7 a.m. to 7 p.m.)  For questions about schools and childcare: Call 494-342-6343 or 1-116.704.3971 (7 a.m. to 7 p.m.)

## 2021-10-05 ENCOUNTER — LAB (OUTPATIENT)
Dept: URGENT CARE | Facility: URGENT CARE | Age: 41
End: 2021-10-05
Attending: FAMILY MEDICINE
Payer: COMMERCIAL

## 2021-10-05 DIAGNOSIS — Z20.822 CLOSE EXPOSURE TO 2019 NOVEL CORONAVIRUS: ICD-10-CM

## 2021-10-05 PROCEDURE — U0005 INFEC AGEN DETEC AMPLI PROBE: HCPCS

## 2021-10-05 PROCEDURE — U0003 INFECTIOUS AGENT DETECTION BY NUCLEIC ACID (DNA OR RNA); SEVERE ACUTE RESPIRATORY SYNDROME CORONAVIRUS 2 (SARS-COV-2) (CORONAVIRUS DISEASE [COVID-19]), AMPLIFIED PROBE TECHNIQUE, MAKING USE OF HIGH THROUGHPUT TECHNOLOGIES AS DESCRIBED BY CMS-2020-01-R: HCPCS

## 2021-10-06 LAB — SARS-COV-2 RNA RESP QL NAA+PROBE: NEGATIVE

## 2022-05-05 ENCOUNTER — OFFICE VISIT (OUTPATIENT)
Dept: FAMILY MEDICINE | Facility: CLINIC | Age: 42
End: 2022-05-05

## 2022-05-05 VITALS
SYSTOLIC BLOOD PRESSURE: 133 MMHG | TEMPERATURE: 98.2 F | OXYGEN SATURATION: 96 % | HEART RATE: 83 BPM | HEIGHT: 72 IN | WEIGHT: 315 LBS | BODY MASS INDEX: 42.66 KG/M2 | RESPIRATION RATE: 16 BRPM | DIASTOLIC BLOOD PRESSURE: 84 MMHG

## 2022-05-05 DIAGNOSIS — F10.20 ALCOHOL DEPENDENCE, CONTINUOUS (H): ICD-10-CM

## 2022-05-05 DIAGNOSIS — R73.03 PREDIABETES: ICD-10-CM

## 2022-05-05 DIAGNOSIS — I10 HYPERTENSION GOAL BP (BLOOD PRESSURE) < 140/90: ICD-10-CM

## 2022-05-05 DIAGNOSIS — M79.674 GREAT TOE PAIN, RIGHT: Primary | ICD-10-CM

## 2022-05-05 DIAGNOSIS — E66.01 MORBID OBESITY (H): ICD-10-CM

## 2022-05-05 LAB
ALBUMIN SERPL-MCNC: 3.6 G/DL (ref 3.4–5)
ALP SERPL-CCNC: 52 U/L (ref 40–150)
ALT SERPL W P-5'-P-CCNC: 43 U/L (ref 0–70)
ANION GAP SERPL CALCULATED.3IONS-SCNC: 7 MMOL/L (ref 3–14)
AST SERPL W P-5'-P-CCNC: 21 U/L (ref 0–45)
BILIRUB SERPL-MCNC: 0.6 MG/DL (ref 0.2–1.3)
BUN SERPL-MCNC: 19 MG/DL (ref 7–30)
CALCIUM SERPL-MCNC: 9.1 MG/DL (ref 8.5–10.1)
CHLORIDE BLD-SCNC: 102 MMOL/L (ref 94–109)
CHOLEST SERPL-MCNC: 151 MG/DL
CO2 SERPL-SCNC: 28 MMOL/L (ref 20–32)
CREAT SERPL-MCNC: 1.14 MG/DL (ref 0.66–1.25)
ERYTHROCYTE [SEDIMENTATION RATE] IN BLOOD BY WESTERGREN METHOD: 6 MM/HR (ref 0–15)
FASTING STATUS PATIENT QL REPORTED: NO
GFR SERPL CREATININE-BSD FRML MDRD: 84 ML/MIN/1.73M2
GLUCOSE BLD-MCNC: 137 MG/DL (ref 70–99)
HBA1C MFR BLD: 5.7 % (ref 0–5.6)
HDLC SERPL-MCNC: 38 MG/DL
LDLC SERPL CALC-MCNC: 90 MG/DL
NONHDLC SERPL-MCNC: 113 MG/DL
POTASSIUM BLD-SCNC: 3.9 MMOL/L (ref 3.4–5.3)
PROT SERPL-MCNC: 7.7 G/DL (ref 6.8–8.8)
SODIUM SERPL-SCNC: 137 MMOL/L (ref 133–144)
TRIGL SERPL-MCNC: 113 MG/DL
URATE SERPL-MCNC: 6.6 MG/DL (ref 3.5–7.2)

## 2022-05-05 PROCEDURE — 36415 COLL VENOUS BLD VENIPUNCTURE: CPT | Performed by: PHYSICIAN ASSISTANT

## 2022-05-05 PROCEDURE — 99213 OFFICE O/P EST LOW 20 MIN: CPT | Performed by: PHYSICIAN ASSISTANT

## 2022-05-05 PROCEDURE — 80053 COMPREHEN METABOLIC PANEL: CPT | Performed by: PHYSICIAN ASSISTANT

## 2022-05-05 PROCEDURE — 84550 ASSAY OF BLOOD/URIC ACID: CPT | Performed by: PHYSICIAN ASSISTANT

## 2022-05-05 PROCEDURE — 83036 HEMOGLOBIN GLYCOSYLATED A1C: CPT | Performed by: PHYSICIAN ASSISTANT

## 2022-05-05 PROCEDURE — 85652 RBC SED RATE AUTOMATED: CPT | Performed by: PHYSICIAN ASSISTANT

## 2022-05-05 PROCEDURE — 80061 LIPID PANEL: CPT | Performed by: PHYSICIAN ASSISTANT

## 2022-05-05 RX ORDER — INDOMETHACIN 50 MG/1
50 CAPSULE ORAL
Qty: 30 CAPSULE | Refills: 1 | Status: SHIPPED | OUTPATIENT
Start: 2022-05-05 | End: 2023-03-31

## 2022-05-05 ASSESSMENT — ENCOUNTER SYMPTOMS
MYALGIAS: 0
COLOR CHANGE: 1
WOUND: 0
JOINT SWELLING: 0
ACTIVITY CHANGE: 0
CONSTITUTIONAL NEGATIVE: 1
ARTHRALGIAS: 1
APPETITE CHANGE: 0
FEVER: 0

## 2022-05-05 ASSESSMENT — PAIN SCALES - GENERAL: PAINLEVEL: SEVERE PAIN (6)

## 2022-05-05 NOTE — PROGRESS NOTES
Subjective   Armando is a 41 year old who presents to clinic for pain in his right great toe. The pain has been resent for approximately a week. Symptoms started and last about a day and then resolved but have been present for the past 2 days rating the pain a 5-7/10. There is no history of trauma to the toe and he reports limited range of motion in all directions due to pain. The pain is located at the MTP joint of the great toe and he has never had anything like this before. He consumes 7-8 alcoholic beverages daily and he consumes a lot of beef, chicken, pork, and seafood. He has not taken any medications up to this point for symptom management.    HPI     Concern - rt foot pain  Onset: yesterday  Description: tight, joint pain  Intensity: moderate  Progression of Symptoms:  worsening  Accompanying Signs & Symptoms: tight  Previous history of similar problem: pain last week in the same spot  Precipitating factors:        Worsened by: walking   Alleviating factors:        Improved by: n/a  Therapies tried and outcome:  none       Review of Systems   Constitutional: Negative.  Negative for activity change, appetite change and fever.   Musculoskeletal: Positive for arthralgias and gait problem. Negative for joint swelling and myalgias.        No trauma   Skin: Positive for color change. Negative for rash and wound.            Objective    /84   Pulse 83   Temp 98.2  F (36.8  C) (Tympanic)   Resp 16   Ht 1.829 m (6')   Wt (!) 161.9 kg (357 lb)   SpO2 96%   BMI 48.42 kg/m    Body mass index is 48.42 kg/m .     Physical Exam  Constitutional:       General: He is not in acute distress.     Appearance: Normal appearance. He is obese. He is not ill-appearing.   Musculoskeletal:         General: Tenderness present. No deformity or signs of injury.      Comments: Tenderness over the right MTP joint of the great toe   Skin:     General: Skin is warm.      Findings: Erythema present. No bruising or rash.    Neurological:      General: No focal deficit present.      Mental Status: He is alert and oriented to person, place, and time. Mental status is at baseline.   Psychiatric:         Mood and Affect: Mood normal.         Behavior: Behavior normal.         Thought Content: Thought content normal.         Judgment: Judgment normal.        Results for orders placed or performed in visit on 05/05/22   Hemoglobin A1c     Status: Abnormal   Result Value Ref Range    Hemoglobin A1C 5.7 (H) 0.0 - 5.6 %    - Labs pending      ICD-10-CM    1. Great toe pain, right  M79.674 Uric acid     ESR: Erythrocyte sedimentation rate     indomethacin (INDOCIN) 50 MG capsule     Uric acid     ESR: Erythrocyte sedimentation rate   2. Alcohol dependence, continuous (H)  F10.20    3. Hypertension goal BP (blood pressure) < 140/90  I10 Lipid panel reflex to direct LDL Fasting     Comprehensive metabolic panel (BMP + Alb, Alk Phos, ALT, AST, Total. Bili, TP)     Hemoglobin A1c     Lipid panel reflex to direct LDL Fasting     Comprehensive metabolic panel (BMP + Alb, Alk Phos, ALT, AST, Total. Bili, TP)     Hemoglobin A1c   4. Prediabetes  R73.03 Comprehensive metabolic panel (BMP + Alb, Alk Phos, ALT, AST, Total. Bili, TP)     Hemoglobin A1c     Comprehensive metabolic panel (BMP + Alb, Alk Phos, ALT, AST, Total. Bili, TP)     Hemoglobin A1c   5. Morbid obesity (H)  E66.01          Assessment & Plan     Great toe pain, right  Suspected gout of the right great toe; patient has been educated on alcohol and red meat contribution towards the condition; prescribed indomethacin for management - labs pending  - Uric acid; Future  - ESR: Erythrocyte sedimentation rate; Future  - indomethacin (INDOCIN) 50 MG capsule; Take 1 capsule (50 mg) by mouth 3 times daily (with meals) for 10 days  - Uric acid  - ESR: Erythrocyte sedimentation rate    Alcohol dependence, continuous (H)  See above    Hypertension goal BP (blood pressure) < 140/90  No changes in  management of blood pressure; patient to continue regimen previously prescribed; modify alcohol consumption and partake in healthy diet - labs pending  - Lipid panel reflex to direct LDL Fasting; Future  - Comprehensive metabolic panel (BMP + Alb, Alk Phos, ALT, AST, Total. Bili, TP); Future  - Hemoglobin A1c; Future  - Lipid panel reflex to direct LDL Fasting  - Comprehensive metabolic panel (BMP + Alb, Alk Phos, ALT, AST, Total. Bili, TP)  - Hemoglobin A1c  Follow up  In 2-4 for routine blood pressure check.     Prediabetes  No changes in management of prediabetes; patient to continue regimen previously prescribed; modify alcohol consumption and partake in healthy diet - labs pending  - Comprehensive metabolic panel (BMP + Alb, Alk Phos, ALT, AST, Total. Bili, TP); Future  - Hemoglobin A1c; Future  - Comprehensive metabolic panel (BMP + Alb, Alk Phos, ALT, AST, Total. Bili, TP)  - Hemoglobin A1c    Morbid obesity (H)  See above       BMI:   Estimated body mass index is 48.42 kg/m  as calculated from the following:    Height as of this encounter: 1.829 m (6').    Weight as of this encounter: 161.9 kg (357 lb).   Weight management plan: Discussed healthy diet and exercise guidelines      Return in about 2 weeks (around 5/19/2022) for BP Recheck.      ----- Services Performed by a MEDICAL STUDENT in Presence of ATTENDING Physician-------      Nathan ADDISON    The student acted as a scribe and the encounter documented above was completely performed by myself and the documentation reflects the work I have performed today.  José Kolb PA-C

## 2022-05-06 NOTE — RESULT ENCOUNTER NOTE
Mr. Domingo,    All of your labs were normal/near normal for you.  Even though your uric acid levels are normal I still believe this is gout.  Your blood sugars are elevated confirming that you have prediabetes.  We can talk about this more at your follow-up appointment.    Please contact the clinic if you have additional questions.  Thank you.    Sincerely,    José Kolb PA-C

## 2022-05-07 ENCOUNTER — HEALTH MAINTENANCE LETTER (OUTPATIENT)
Age: 42
End: 2022-05-07

## 2022-05-10 DIAGNOSIS — I10 BENIGN ESSENTIAL HYPERTENSION: ICD-10-CM

## 2022-05-12 RX ORDER — LISINOPRIL 20 MG/1
TABLET ORAL
Qty: 90 TABLET | Refills: 3 | Status: SHIPPED | OUTPATIENT
Start: 2022-05-12 | End: 2023-03-31

## 2022-05-13 ENCOUNTER — TELEPHONE (OUTPATIENT)
Dept: FAMILY MEDICINE | Facility: CLINIC | Age: 42
End: 2022-05-13
Payer: COMMERCIAL

## 2022-05-13 NOTE — TELEPHONE ENCOUNTER
Routed this encounter to José Kolb for further refills.  Also change PCP to Dr. Kolb for his current PCP.    Leanna Tam     Luigi Escobedo

## 2022-05-13 NOTE — TELEPHONE ENCOUNTER
I haven't seen him since 3/2021.   I believe he switched to José Kolb , was seen on 5/55 with him and has another visit coming up on 5/19/22.  Please update PCP name in chart after checking with patient and route refills accordingly

## 2022-05-18 NOTE — PROGRESS NOTES
Assessment & Plan       ICD-10-CM    1. Hypertension goal BP (blood pressure) < 140/90  I10    2. Prediabetes  R73.03    3. Morbid obesity with BMI of 45.0-49.9, adult (H)  E66.01     Z68.42      medical conditions are stable. meds refilled.  Work on Healthy diet and exercise. Getting heart rate elevated for 30 mins most days of week.      Return in about 6 months (around 11/19/2022) for recheck.    José Kolb PA-C  [facility] TEO Roberts is a 41 year old who presents for the following health issues     HPI     Hypertension Follow-up      Do you check your blood pressure regularly outside of the clinic? No     Are you following a low salt diet? Yes    Are your blood pressures ever more than 140 on the top number (systolic) OR more   than 90 on the bottom number (diastolic), for example 140/90?     Labs reviewed- pre-diabetes.    Review of Systems   Constitutional, HEENT, cardiovascular, pulmonary, gi and gu systems are negative, except as otherwise noted.      Objective    /80   Pulse 83   Temp 97.5  F (36.4  C) (Tympanic)   Wt (!) 163.7 kg (361 lb)   SpO2 98%   BMI 48.96 kg/m    Body mass index is 48.96 kg/m .  Physical Exam   GENERAL: healthy, alert and no distress  RESP: lungs clear to auscultation - no rales, rhonchi or wheezes  CV: regular rate and rhythm, normal S1 S2, no S3 or S4, no murmur, click or rub, no peripheral edema and peripheral pulses strong  MS: no gross musculoskeletal defects noted, no edema    Labs removed.

## 2022-05-19 ENCOUNTER — OFFICE VISIT (OUTPATIENT)
Dept: FAMILY MEDICINE | Facility: CLINIC | Age: 42
End: 2022-05-19
Payer: COMMERCIAL

## 2022-05-19 VITALS
BODY MASS INDEX: 48.96 KG/M2 | TEMPERATURE: 97.5 F | HEART RATE: 83 BPM | OXYGEN SATURATION: 98 % | SYSTOLIC BLOOD PRESSURE: 128 MMHG | WEIGHT: 315 LBS | DIASTOLIC BLOOD PRESSURE: 80 MMHG

## 2022-05-19 DIAGNOSIS — E66.01 MORBID OBESITY WITH BMI OF 45.0-49.9, ADULT (H): ICD-10-CM

## 2022-05-19 DIAGNOSIS — I10 HYPERTENSION GOAL BP (BLOOD PRESSURE) < 140/90: Primary | ICD-10-CM

## 2022-05-19 DIAGNOSIS — R73.03 PREDIABETES: ICD-10-CM

## 2022-05-19 PROCEDURE — 99213 OFFICE O/P EST LOW 20 MIN: CPT | Performed by: PHYSICIAN ASSISTANT

## 2022-05-19 ASSESSMENT — PAIN SCALES - GENERAL: PAINLEVEL: NO PAIN (0)

## 2022-05-19 NOTE — NURSING NOTE
Chief Complaint   Patient presents with     Hypertension       Initial BP (!) 146/88   Pulse 83   Temp 97.5  F (36.4  C) (Tympanic)   Wt (!) 163.7 kg (361 lb)   SpO2 98%   BMI 48.96 kg/m   Estimated body mass index is 48.96 kg/m  as calculated from the following:    Height as of 5/5/22: 1.829 m (6').    Weight as of this encounter: 163.7 kg (361 lb).  Medication Reconciliation: antionette Pena MA

## 2022-11-14 ENCOUNTER — ANCILLARY PROCEDURE (OUTPATIENT)
Dept: CT IMAGING | Facility: CLINIC | Age: 42
End: 2022-11-14
Attending: STUDENT IN AN ORGANIZED HEALTH CARE EDUCATION/TRAINING PROGRAM
Payer: COMMERCIAL

## 2022-11-14 ENCOUNTER — OFFICE VISIT (OUTPATIENT)
Dept: URGENT CARE | Facility: URGENT CARE | Age: 42
End: 2022-11-14
Payer: COMMERCIAL

## 2022-11-14 ENCOUNTER — OFFICE VISIT (OUTPATIENT)
Dept: PEDIATRICS | Facility: CLINIC | Age: 42
End: 2022-11-14
Attending: NURSE PRACTITIONER
Payer: COMMERCIAL

## 2022-11-14 ENCOUNTER — ANCILLARY PROCEDURE (OUTPATIENT)
Dept: ULTRASOUND IMAGING | Facility: CLINIC | Age: 42
End: 2022-11-14
Attending: STUDENT IN AN ORGANIZED HEALTH CARE EDUCATION/TRAINING PROGRAM
Payer: COMMERCIAL

## 2022-11-14 VITALS
DIASTOLIC BLOOD PRESSURE: 98 MMHG | TEMPERATURE: 97.2 F | RESPIRATION RATE: 20 BRPM | SYSTOLIC BLOOD PRESSURE: 146 MMHG | OXYGEN SATURATION: 98 % | HEART RATE: 71 BPM

## 2022-11-14 VITALS
OXYGEN SATURATION: 97 % | SYSTOLIC BLOOD PRESSURE: 145 MMHG | TEMPERATURE: 97.1 F | DIASTOLIC BLOOD PRESSURE: 87 MMHG | RESPIRATION RATE: 20 BRPM | HEART RATE: 79 BPM

## 2022-11-14 DIAGNOSIS — R10.11 RUQ ABDOMINAL PAIN: Primary | ICD-10-CM

## 2022-11-14 DIAGNOSIS — R10.9 FLANK PAIN: ICD-10-CM

## 2022-11-14 DIAGNOSIS — R10.11 RUQ ABDOMINAL PAIN: ICD-10-CM

## 2022-11-14 LAB
ALBUMIN SERPL-MCNC: 3.9 G/DL (ref 3.4–5)
ALBUMIN UR-MCNC: NEGATIVE MG/DL
ALP SERPL-CCNC: 60 U/L (ref 40–150)
ALT SERPL W P-5'-P-CCNC: 55 U/L (ref 0–70)
ANION GAP SERPL CALCULATED.3IONS-SCNC: 3 MMOL/L (ref 3–14)
APPEARANCE UR: CLEAR
AST SERPL W P-5'-P-CCNC: 25 U/L (ref 0–45)
BASOPHILS # BLD AUTO: 0 10E3/UL (ref 0–0.2)
BASOPHILS NFR BLD AUTO: 0 %
BILIRUB SERPL-MCNC: 0.6 MG/DL (ref 0.2–1.3)
BILIRUB UR QL STRIP: NEGATIVE
BUN SERPL-MCNC: 16 MG/DL (ref 7–30)
CALCIUM SERPL-MCNC: 9 MG/DL (ref 8.5–10.1)
CHLORIDE BLD-SCNC: 108 MMOL/L (ref 94–109)
CO2 SERPL-SCNC: 27 MMOL/L (ref 20–32)
COLOR UR AUTO: NORMAL
CREAT SERPL-MCNC: 1.04 MG/DL (ref 0.66–1.25)
EOSINOPHIL # BLD AUTO: 0.3 10E3/UL (ref 0–0.7)
EOSINOPHIL NFR BLD AUTO: 3 %
ERYTHROCYTE [DISTWIDTH] IN BLOOD BY AUTOMATED COUNT: 12.4 % (ref 10–15)
GFR SERPL CREATININE-BSD FRML MDRD: >90 ML/MIN/1.73M2
GLUCOSE BLD-MCNC: 89 MG/DL (ref 70–99)
GLUCOSE UR STRIP-MCNC: NEGATIVE MG/DL
HCT VFR BLD AUTO: 47.5 % (ref 40–53)
HGB BLD-MCNC: 15.9 G/DL (ref 13.3–17.7)
HGB UR QL STRIP: NEGATIVE
IMM GRANULOCYTES # BLD: 0 10E3/UL
IMM GRANULOCYTES NFR BLD: 0 %
KETONES UR STRIP-MCNC: NEGATIVE MG/DL
LEUKOCYTE ESTERASE UR QL STRIP: NEGATIVE
LIPASE SERPL-CCNC: 104 U/L (ref 73–393)
LYMPHOCYTES # BLD AUTO: 2.2 10E3/UL (ref 0.8–5.3)
LYMPHOCYTES NFR BLD AUTO: 24 %
MCH RBC QN AUTO: 27.7 PG (ref 26.5–33)
MCHC RBC AUTO-ENTMCNC: 33.5 G/DL (ref 31.5–36.5)
MCV RBC AUTO: 83 FL (ref 78–100)
MONOCYTES # BLD AUTO: 0.6 10E3/UL (ref 0–1.3)
MONOCYTES NFR BLD AUTO: 6 %
NEUTROPHILS # BLD AUTO: 6.2 10E3/UL (ref 1.6–8.3)
NEUTROPHILS NFR BLD AUTO: 67 %
NITRATE UR QL: NEGATIVE
NRBC # BLD AUTO: 0 10E3/UL
NRBC BLD AUTO-RTO: 0 /100
PH UR STRIP: 6.5 [PH] (ref 5–7)
PLATELET # BLD AUTO: 294 10E3/UL (ref 150–450)
POTASSIUM BLD-SCNC: 4.1 MMOL/L (ref 3.4–5.3)
PROT SERPL-MCNC: 7.8 G/DL (ref 6.8–8.8)
RBC # BLD AUTO: 5.73 10E6/UL (ref 4.4–5.9)
RBC #/AREA URNS AUTO: NORMAL /HPF
SODIUM SERPL-SCNC: 138 MMOL/L (ref 133–144)
SP GR UR STRIP: 1.02 (ref 1–1.03)
UROBILINOGEN UR STRIP-MCNC: NORMAL MG/DL
WBC # BLD AUTO: 9.3 10E3/UL (ref 4–11)
WBC #/AREA URNS AUTO: NORMAL /HPF

## 2022-11-14 PROCEDURE — 74176 CT ABD & PELVIS W/O CONTRAST: CPT | Mod: GC | Performed by: RADIOLOGY

## 2022-11-14 PROCEDURE — 85025 COMPLETE CBC W/AUTO DIFF WBC: CPT | Performed by: STUDENT IN AN ORGANIZED HEALTH CARE EDUCATION/TRAINING PROGRAM

## 2022-11-14 PROCEDURE — 36415 COLL VENOUS BLD VENIPUNCTURE: CPT | Performed by: STUDENT IN AN ORGANIZED HEALTH CARE EDUCATION/TRAINING PROGRAM

## 2022-11-14 PROCEDURE — 81001 URINALYSIS AUTO W/SCOPE: CPT | Performed by: STUDENT IN AN ORGANIZED HEALTH CARE EDUCATION/TRAINING PROGRAM

## 2022-11-14 PROCEDURE — 76705 ECHO EXAM OF ABDOMEN: CPT | Performed by: RADIOLOGY

## 2022-11-14 PROCEDURE — 83690 ASSAY OF LIPASE: CPT | Performed by: STUDENT IN AN ORGANIZED HEALTH CARE EDUCATION/TRAINING PROGRAM

## 2022-11-14 PROCEDURE — 80053 COMPREHEN METABOLIC PANEL: CPT | Performed by: STUDENT IN AN ORGANIZED HEALTH CARE EDUCATION/TRAINING PROGRAM

## 2022-11-14 PROCEDURE — 99215 OFFICE O/P EST HI 40 MIN: CPT | Performed by: STUDENT IN AN ORGANIZED HEALTH CARE EDUCATION/TRAINING PROGRAM

## 2022-11-14 PROCEDURE — 99207 REFERRAL TO ACUTE AND DIAGNOSTIC SERVICES: CPT | Performed by: NURSE PRACTITIONER

## 2022-11-14 NOTE — PROGRESS NOTES
Assessment & Plan     RUQ abdominal pain  Patient presents with right-sided abdominal pain that is intermittent. He has a history of kidney stones and reports that the pain feels similar so I advised a CT abdomen w/o contrast to rule out kidney stone which was negative. Given the pain is in the RUQ, I recommended US to rule out cholecystitis given US is more sensitive for evaluating the gallbladder and US was also negative. His blood work and urine are normal. He is having no changes in bowel or bladder habits. Denies chest pain, reflux or shortness of breath. He has a history of alcohol dependence and quit drinking alcohol completely 2 weeks ago. He has fatty liver seen on CT which is unlikely causing the current symptoms. We discussed other possible etiologies of radiculopathy from the back, muscle strain of the abdominal wall muscles as next most likely causes. I advised rest, pushing his fluid intake, using heat to relax the back and abdominal muscles and monitoring the symptoms. I advised if he develops fever, chills, vomiting, to go to the ER. If the pain is persistent but not worsening, advised to see PCP or return to urgent care. Patient agrees with plan of care.   - Lipase; Future  - Comprehensive metabolic panel; Future  - CBC with platelets differential; Future  - CT Abdomen Pelvis w/o Contrast; Future  - CT Abdomen Pelvis w/o Contrast  - CBC with platelets differential  - Comprehensive metabolic panel  - Lipase  - US Abdomen Limited; Future    Flank pain  - UA with Microscopic reflex to Culture; Future  - UA with Microscopic reflex to Culture  - Urine Microscopic  - Lipase; Future  - Comprehensive metabolic panel; Future  - CBC with platelets differential; Future  - CT Abdomen Pelvis w/o Contrast; Future  - CT Abdomen Pelvis w/o Contrast  - CBC with platelets differential  - Comprehensive metabolic panel  - Lipase      45 minutes spent on the date of the encounter doing chart review, review of test  results, interpretation of tests, patient visit and documentation        BMI:   Estimated body mass index is 48.96 kg/m  as calculated from the following:    Height as of 5/5/22: 1.829 m (6').    Weight as of 5/19/22: 163.7 kg (361 lb).   Weight management plan: Discussed healthy diet and exercise guidelines        No follow-ups on file.    JAYE Bravo CNP  [facility] VINEET Roberts is a 42 year old, presenting for the following health issues:  Abdominal Pain (RUQ) and Flank Pain (Right)      HPI Patient has been experiencing pain since Saturday in his right upper quadrant of his abdomin as well as his right flank.    Pain History:  When did you first notice your pain? - Acute Pain   Have you seen anyone else for your pain? Yes - Urgent Care - [place]  Where in your body do you have pain? Abdominal/Flank Pain  Onset/Duration: Saturday  Description:   Character: Sharp  Location: right upper quadrant right flank  Radiation:Back  Intensity: 4/10  Progression of Symptoms:  same and waxing and waning, worsens with moving, worse when sleeping.   Accompanying Signs & Symptoms:  Fever/Chills: No  Gas/Bloating: YES  Nausea: No  Vomitting: No  Diarrhea: No  Constipation: No  Dysuria or Hematuria: No, patient states when he urinates, the pain feels worse in his RUQ   History:   Trauma: No  Previous similar pain: Patient states he did have a kidney stone 10 years ago and the pain feels a little similar to that.   Previous tests done: No  Precipitating factors:   Does the pain change with:     Food: No    Bowel Movement: No    Urination: YES   Other factors:  No  Therapies tried and outcome: Patient took 1,000 mg of Tylenol yesterday and it seemed to help with the pain.    Patient Active Problem List   Diagnosis     Hypertension goal BP (blood pressure) < 140/90     Hyperlipidemia LDL goal <130     Hepatic steatosis     Alcohol dependence, continuous (H)     AMADEO (obstructive sleep  apnea) Severe     Restless leg     Prediabetes     Microalbuminuria     Morbid obesity with BMI of 45.0-49.9, adult (H)     Current Outpatient Medications   Medication     aspirin 81 MG tablet     Blood Pressure Monitor KIT     indomethacin (INDOCIN) 50 MG capsule     lisinopril (ZESTRIL) 20 MG tablet     No current facility-administered medications for this visit.             Review of Systems   Constitutional, HEENT, cardiovascular, pulmonary, GI, , musculoskeletal, neuro, skin, endocrine and psych systems are negative, except as otherwise noted.      Objective    BP (!) 146/98 (BP Location: Right arm, Patient Position: Chair, Cuff Size: Adult Regular)   Pulse 71   Temp 97.2  F (36.2  C) (Oral)   Resp 20   SpO2 98%   There is no height or weight on file to calculate BMI.  Physical Exam   GENERAL: alert, no distress and obese  EYES: Eyes grossly normal to inspection, PERRL and conjunctivae and sclerae normal  RESP: lungs clear to auscultation - no rales, rhonchi or wheezes  CV: regular rate and rhythm, normal S1 S2, no S3 or S4, no murmur, click or rub, no peripheral edema  ABDOMEN: no localized tenderness of abdomen to palpation bowel sounds normal   MS: no gross musculoskeletal defects noted, no edema  SKIN: no suspicious lesions or rashes  NEURO: Normal strength and tone, mentation intact and speech normal  BACK: no CVA tenderness, no paralumbar tenderness  PSYCH: mentation appears normal, affect normal/bright    Results for orders placed or performed in visit on 11/14/22 (from the past 24 hour(s))   UA with Microscopic reflex to Culture    Specimen: Urine, Midstream   Result Value Ref Range    Color Urine Light Yellow Colorless, Straw, Light Yellow, Yellow    Appearance Urine Clear Clear    Glucose Urine Negative Negative mg/dL    Bilirubin Urine Negative Negative    Ketones Urine Negative Negative mg/dL    Specific Gravity Urine 1.016 0.999 - 1.035    Blood Urine Negative Negative    pH Urine 6.5 5.0 -  7.0    Protein Albumin Urine Negative Negative mg/dL    Nitrite Urine Negative Negative    Leukocyte Esterase Urine Negative Negative    Urobilinogen Urine Normal Normal, 2.0 mg/dL   Urine Microscopic   Result Value Ref Range    RBC Urine None Seen 0-2 /HPF /HPF    WBC Urine None Seen 0-5 /HPF /HPF    Narrative    Urine Culture not indicated   CBC with platelets differential    Narrative    The following orders were created for panel order CBC with platelets differential.  Procedure                               Abnormality         Status                     ---------                               -----------         ------                     CBC with platelets and d...[[id number]]                      Final result                 Please view results for these tests on the individual orders.   Comprehensive metabolic panel   Result Value Ref Range    Sodium 138 133 - 144 mmol/L    Potassium 4.1 3.4 - 5.3 mmol/L    Chloride 108 94 - 109 mmol/L    Carbon Dioxide (CO2) 27 20 - 32 mmol/L    Anion Gap 3 3 - 14 mmol/L    Urea Nitrogen 16 7 - 30 mg/dL    Creatinine 1.04 0.66 - 1.25 mg/dL    Calcium 9.0 8.5 - 10.1 mg/dL    Glucose 89 70 - 99 mg/dL    Alkaline Phosphatase 60 40 - 150 U/L    AST 25 0 - 45 U/L    ALT 55 0 - 70 U/L    Protein Total 7.8 6.8 - 8.8 g/dL    Albumin 3.9 3.4 - 5.0 g/dL    Bilirubin Total 0.6 0.2 - 1.3 mg/dL    GFR Estimate >90 >60 mL/min/1.73m2   Lipase   Result Value Ref Range    Lipase 104 73 - 393 U/L   CBC with platelets and differential   Result Value Ref Range    WBC Count 9.3 4.0 - 11.0 10e3/uL    RBC Count 5.73 4.40 - 5.90 10e6/uL    Hemoglobin 15.9 13.3 - 17.7 g/dL    Hematocrit 47.5 40.0 - 53.0 %    MCV 83 78 - 100 fL    MCH 27.7 26.5 - 33.0 pg    MCHC 33.5 31.5 - 36.5 g/dL    RDW 12.4 10.0 - 15.0 %    Platelet Count 294 150 - 450 10e3/uL    % Neutrophils 67 %    % Lymphocytes 24 %    % Monocytes 6 %    % Eosinophils 3 %    % Basophils 0 %    % Immature Granulocytes 0 %    NRBCs per 100 WBC  0 <1 /100    Absolute Neutrophils 6.2 1.6 - 8.3 10e3/uL    Absolute Lymphocytes 2.2 0.8 - 5.3 10e3/uL    Absolute Monocytes 0.6 0.0 - 1.3 10e3/uL    Absolute Eosinophils 0.3 0.0 - 0.7 10e3/uL    Absolute Basophils 0.0 0.0 - 0.2 10e3/uL    Absolute Immature Granulocytes 0.0 <=0.4 10e3/uL    Absolute NRBCs 0.0 10e3/uL     No results found for this visit on 11/14/22.

## 2022-11-14 NOTE — PROGRESS NOTES
SUBJECTIVE  HPI:  Armando Domingo is a 42 year old male who presents with the CC of abdominal pain.    Pain is located in the RUQ area, with radiation to back.  The pain is characterized as sharp, and at worst is a level 5 on a scale of 1-10.  Pain has been present for 3 day(s) and is slowly progressive.  EXACERBATING FACTORS: cough/deep breath and movement/walking  RELIEVING FACTORS: nothing.  ASSOCIATED SX: none  Reports it feels like when had kidney stones (denies hematuria dysuria nausea vomiting fever chills constipation diarrhea)    Past Medical History:   Diagnosis Date     Fatty liver     CT scan  w/ hepatic steatosis     GERD (gastroesophageal reflux disease)      Hypertension      AMADEO (obstructive sleep apnea)     Does not use anything, encouraged to restart cpap .     Reflux      Restless legs      Current Outpatient Medications   Medication Sig Dispense Refill     aspirin 81 MG tablet Take 1 tablet by mouth daily. 30 tablet 3     Blood Pressure Monitor KIT Use to check blood pressure daily 1 kit 0     lisinopril (ZESTRIL) 20 MG tablet TAKE 1 TABLET (20 MG) BY MOUTH DAILY. 90 tablet 3     indomethacin (INDOCIN) 50 MG capsule Take 1 capsule (50 mg) by mouth 3 times daily (with meals) for 10 days 30 capsule 1     Social History     Tobacco Use     Smoking status: Former     Packs/day: 0.10     Years: 15.00     Pack years: 1.50     Types: Cigarettes     Start date: 1996     Quit date: 2013     Years since quittin.8     Smokeless tobacco: Never   Substance Use Topics     Alcohol use: Yes     Alcohol/week: 40.0 standard drinks     Types: 40 Standard drinks or equivalent per week     Comment: advised cutting down 7/3/17       ROS:  Review of systems negative except as stated above.    OBJECTIVE:  BP (!) 145/87   Pulse 79   Temp 97.1  F (36.2  C) (Tympanic)   Resp 20   SpO2 97%   GENERAL APPEARANCE: alert and no distress  RESP: lungs clear to auscultation - no rales, rhonchi or  wheezes  CV: regular rates and rhythm, normal S1 S2, no murmur noted  ABDOMEN: obese, soft, normal bowel sounds, tenderness mild-moderate RUQ  NEURO: Normal strength and tone, sensory exam grossly normal,  normal speech and mentation  SKIN: no suspicious lesions or rashes    ASSESSMENT:  (R10.11) RUQ abdominal pain  (primary encounter diagnosis)    Plan: Will go to [facility] for further workup   Discussed with provider and referral ordered      JAYE Faria CNP

## 2022-11-23 ENCOUNTER — MYC MEDICAL ADVICE (OUTPATIENT)
Dept: PEDIATRICS | Facility: CLINIC | Age: 42
End: 2022-11-23

## 2022-12-20 ENCOUNTER — VIRTUAL VISIT (OUTPATIENT)
Dept: INTERNAL MEDICINE | Facility: CLINIC | Age: 42
End: 2022-12-20
Payer: COMMERCIAL

## 2022-12-20 DIAGNOSIS — I10 BENIGN ESSENTIAL HYPERTENSION: ICD-10-CM

## 2022-12-20 DIAGNOSIS — U07.1 INFECTION DUE TO 2019 NOVEL CORONAVIRUS: Primary | ICD-10-CM

## 2022-12-20 DIAGNOSIS — J98.01 BRONCHOSPASM: ICD-10-CM

## 2022-12-20 PROCEDURE — 99214 OFFICE O/P EST MOD 30 MIN: CPT | Mod: 95 | Performed by: INTERNAL MEDICINE

## 2022-12-20 RX ORDER — BENZONATATE 200 MG/1
200 CAPSULE ORAL 3 TIMES DAILY PRN
Qty: 30 CAPSULE | Refills: 0 | Status: SHIPPED | OUTPATIENT
Start: 2022-12-20 | End: 2023-01-03

## 2022-12-20 RX ORDER — ALBUTEROL SULFATE 90 UG/1
2 AEROSOL, METERED RESPIRATORY (INHALATION) EVERY 4 HOURS PRN
Qty: 18 G | Refills: 1 | Status: SHIPPED | OUTPATIENT
Start: 2022-12-20 | End: 2024-06-14

## 2022-12-20 NOTE — PROGRESS NOTES
Armando is a 42 year old who is being evaluated via a billable telephone visit.      What phone number would you like to be contacted at? [phone]  How would you like to obtain your AVS? [place]  Distant Location (provider location):  On-site    Assessment & Plan     (U07.1) Infection due to 2019 novel coronavirus  (primary encounter diagnosis)  Comment: Covid 19 infection based on symptoms and positive Covid test.  Reviewed the basics of Covid-19 infection including testing, treatment, isolation recommendations, and protocols for recovery.   Most Covid-19 cases require only symptomatic cares, such as acetaminophen for fevers, headaches, Ibuprofen for fevers and body aches.  The more advanced treatments are usually indicated for more seriouisly ill patients that require hospitalization.   The large majority of cases will resolve without any specific treatment.   Based on the patient's co-morbid condition(s) (HTN), they are at greater risk for complications from COVID infection and would qualify for Paxlovid therapy to reduce risk of hospitalization and risk of more serious consequences from COVID infection.   Renal function normal.  Medication list reviewed for possible drug-drug interactions and adjustments in medications are not required.    Prescription sent to their usual pharmacy,    Also add benzonatate 200 mg 3 times daily as needed for coughing suppression.    Mild expiratory wheezes heard with coughing indicating slight degree of bronchospasm.  Discussed issues of bronchial disease/bronchospasm.    Recommended symptomatic treatment with bronchodilator (albuterol) as needed.  Due to the degree of inflammation present in exam, Prednisone was not indicated.  Antibiotics are not indicated based on history and exam findings today.    Caution the patient that the airways may remain more easily bothered over the next several weeks, and should use albuterol as needed.    Patient was instructed to contact us if  "there is any worsening, changes in symptoms, or no improvement.        Go to the ER immediately for any respiratory distress or significantly worsening symptoms.       Prolonged convalescence from viral infections are very common with potentially numerous generalized symptoms due to the systemic nature of both infection.    The typical recovery course from most common viral URIs and post viral syndrome with the most acute symptoms usually over in 5-7 days, but then a lingering convalescence phase with lingering symptoms such as fatigue, malaise, decreased appetite, mild joint or muscle aching, etc.    The timeframe for recovery from COVID-19 symptoms can last far longer than typical viral syndromes.  Symptoms associated with the \"post Covid 19 syndrome\" can include decreased appetite, shortness of breath, mood changes, palpitations, increased anxiety, insomnia, nonspecific fatigue, and \"brain fog\".    Prolonged coughing and more airway irritability may continue for many weeks after COVID infection.    Unfortunately, there is no specific treatment for this post Covid syndrome other than supportive cares, getting adequate sleep, adequate nutrition, and adequate hydration.  May return to activities as tolerated and prioritize their activities accordingly.    Return for any worsening or changing of symptoms or if their symptoms are interfering with their activities of daily living to any significant degree.      Contact primary care team with any questions or concerns.    Plan:     (I10) Benign essential hypertension  Comment: This condition is currently controlled on the current medical regimen.  Continue current therapy.   Plan:                     Return in about 1 week (around 12/27/2022) for With your primary MD, for recheck, if the symptoms fail to improve.    Erick Ramey MD  [facility] [place]    Guillermo Roberts is a 42 year old, presenting for the following health " issues:  No chief complaint on file.      HPI     Wife was COVID positive last week  COVID-19 Symptom Review  How many days ago did these symptoms start? 2-3 days ago  Positive home test this morning  Are any of the following symptoms significant for you?    New or worsening difficulty breathing? No    Worsening cough? Yes, loose might be productive, patient unsure    Fever or chills?yes, chills last night- the highest temperature was 99.8 today    Headache: YES    Sore throat: YES    Chest pain: No    Diarrhea: No    Body aches? YES  Facial congestion, runny nose, fatigue,   What treatments has patient tried? Acetaminophen and Ibuprofen, cough drops   Does patient live in a nursing home, group home, or shelter? No  Does patient have a way to get food/medications during quarantined? Yes, I have a friend or family member who can help me.    Wife developed signs and symptoms acute COVID infection with a positive test approxi-2 days before the patient,   She received Paxlovid treatment and is already improving.    No history of asthma or prior respiratory disease.    2.    Hypertension:  History of hypertension, on medication.  No reported side effects from medications.    Reviewed last 6 BP readings in chart:  BP Readings from Last 6 Encounters:   11/14/22 (!) 146/98   11/14/22 (!) 145/87   05/19/22 128/80   05/05/22 133/84   06/25/21 122/84   06/23/21 131/83     No active cardiac complaints or symptoms with exercise.         Review of Systems         Objective           Vitals:  No vitals were obtained today due to virtual visit.    Physical Exam   healthy, alert and no distress  PSYCH: Alert and oriented times 3; coherent speech, normal   rate and volume, able to articulate logical thoughts, able   to abstract reason, no tangential thoughts, no hallucinations   or delusions  His affect is normal  RESP: Audible coughing with an expiratory wheeze,, able to talk in full sentences  Remainder of exam unable to be  completed due to telephone visits    Lab Results   Component Value Date    CR 1.04 11/14/2022    CR 1.14 05/05/2022       GFR Estimate   Date Value Ref Range Status   11/14/2022 >90 >60 mL/min/1.73m2 Final     Comment:     Effective December 21, 2021 eGFRcr in adults is calculated using the 2021 CKD-EPI creatinine equation which includes age and gender (Tk anderson al., [place], DOI: 10.1056/QXSWvp9409554)   05/05/2022 84 >60 mL/min/1.73m2 Final     Comment:         **Phone call duration: 15:56 minutes

## 2022-12-20 NOTE — PATIENT INSTRUCTIONS
"   Due your ongoing medical conditions (Hypertension) you are indicated to receive specific treatment for COVID infection to help reduce the chance of severe disease and hospitalization.    Take Paxlovid twice per day according to the instructions on the package.    I will send this medication to your usual pharmacy.    Some medications may require dose adjustments due to possible interactions with Paxlovid    Based on your medication list, dose changes to your medications are not required     --you may continue all other medications (prescription or over the counter) at the same doses    Symptomatic care using the recommendations below as needed.  (SEE BELOW)      Go to the emergency room immediately should you develop any severe breathing difficulties or other concerning symptoms.     Follow up with your primary care provider if you have concerning lingering symptoms.      The acute symptoms from COVID infections usually resolve within 5-7 days and those who have been vaccinated for COVID (potentially longer those who have not been vaccinated).  Many times there are lingering symptoms that may continue for a few weeks afterwards, called the \"post COVID syndrome\".       Review the information listed below regarding managing acute COVID infection and the post-COVID syndrome.      *  Use the Albuterol inhaler as needed for any coughing, wheezing, tightness in the breathing, or shortness of breath.   Consider using it before any known triggers for our coughing or wheezing (usually these include cold or hot temperatures, humidity, dust, air pollutants, smoke).  Expect that your airways may remain more easily irritated for a few weeks after upper respiratory infections.     If you require the albuterol rescue inhaler on a regular basis more than a few times per week, you may need additional medications to help control the breathing better.    These are the available forms of Albuterol, your insurance formulary will " determine which one is preferred.  They all work the same.          [For the safety of others, it's very important to follow these rules.]     First, stay home and away from others (self-isolate) until:   You've had no fever--and no medicine that reduces fever--for 1-2 full days (24-48 hours). And    Your other symptoms have improved (but not necessarily totally back to normal, just improved). For example, your cough or breathing has improved. And   At least 5 days have passed since your symptoms started.  During this time:  Don't go to work, school or anywhere else.   Stay away from others in your home. No hugging, kissing or shaking hands.   Stay in a specific room away from other people in your home as much as possible.  Use separate bathroom, if possible.  Wear a facemask if you need to be around other people in your home.   Don't let anyone visit.  Cover your mouth and nose with a mask, tissue or wash cloth to avoid spreading germs.     Wash your hands and face with soap and water.  You should restrict contact with pets and other animals while you are sick with COVID-19. Avoid contact with your pet, including petting, snuggling, being kissed or licked, and sharing food. When possible, have another member of your household care for your animals while you are sick.     Wear a mask while in public for 5 days following the initial period of isolation.     Return to work:    You should not go back to work until you meet the guidelines above for ending your home isolation. You should meet these along with any other guidelines that your employer has.   You must meet the above guidelines before going back to work in person.    You do NOT need to have a negative Covid test before returning to work, as your Covid test may remain positive for days to weeks.         How can I take care of myself?  Take Tylenol (acetaminophen) for fever or pain. If you have liver or kidney problems, ask your family doctor if it's okay to  take Tylenol.   Take either:   650 mg (two 325 mg pills) every 4 to 6 hours, or   1,000 mg (two 500 mg pills) every 8 hours as needed.   Note: Don't take more than 3,000 mg in one day.    Be sure to give your body adequate rest, adequate hydration, and adequate nutrition during this period of recovery.    If you have other health problems (like cancer, heart failure, an organ transplant or severe kidney disease): Call your specialty clinic if you don't feel better in the next 2 days.     Know when to call 911: If your breathing is so bad that it keeps you from doing normal activities, call 911 or go to the emergency room. Tell them that you've been staying home and may have COVID-19.     Sign up for [employer]. We know it's scary to hear that you have COVID-19. We want to track your symptoms to make sure you're okay over the next 2 weeks. Please look for an email from [employer]--this is a free, online program that we'll use to keep in touch. To sign up, follow the link in the email. Learn more at [email or web address].     Where can I get more information?  To learn the [place]'s guidelines for staying home, please visit the [place] Department of Health website at [email or web address]/[email or web address].  To learn more about COVID-19 and how to care for yourself at home, please visit the CDC website at https://www.cdc.gov/coronavirus/2019-ncov/about/steps-when-sick.html.  For more options for care at [facility], please visit our website at [email or web address]    [place] Dept of Health ([facility]) COVID-19 Hotline:   725.461.1588        Coping with Life After COVID-19  Being in the hospital because of COVID-19 is scary. Going home can be scary, too. You may face changes to your life, the way you work or what you can eat. It s hard to adjust to change, and it s normal to feel afraid, frustrated or even angry. These feelings usually go away over time. If  "your feelings don t start to get better, it s called  adjustment disorder.        Post Covid Syndrome:     Prolonged convalescence from viral infections are very common with potentially numerous generalized symptoms due to the systemic nature of both infection.    The typical recovery course from most common viral URIs and post viral syndrome with the most acute symptoms usually over in 5-7 days, but then a lingering convalescence phase with lingering symptoms such as fatigue, malaise, decreased appetite, mild joint or muscle aching, etc.    The timeframe for recovery from COVID-19 symptoms can last far longer than typical viral syndromes.  Symptoms associated with the \"post Covid 19 syndrome\" can include decreased appetite, shortness of breath, mood changes, palpitations, increased anxiety, insomnia, nonspecific fatigue, and \"brain fog\".    Unfortunately, there is no specific treatment for this post Covid syndrome other than supportive cares, getting adequate sleep, adequate nutrition, and adequate hydration.  Understanding that it may take more time to recover after COVID-19 infections is important  May return to activities as tolerated and prioritize their activities accordingly.    Return for any worsening or changing of symptoms or if their symptoms are interfering with their activities of daily living to any significant degree.     *  Your Covid test can remain positive for up to 90 days afterwards due to retained dead virus particles in the nasal passages.  You should not have any Covid testing during this time because your test may be positive, which would cause you troubles.  If you require any Covid testing during this time, you can meet the requirements with a copy of the positive test results and a letter from us declaring that you had an infection that has resolved.  Schedule a virtual visit appointment ( telephone or video) should you need us to provide any such documentation.       What signs should I " look out for?  Adjustment disorder can happen to anyone in a time of stress. It makes it hard to cope with daily life. You may feel lonely or fight with loved ones, even if you re glad to be home. Watch for these signs:  Fear or worry  Hard time focusing  Sadness or anger  Trouble talking to family or friends  Feeling like you don t fit in or isolating yourself  Problems with sleep   Drinking alcohol or taking drugs to cope    What can I do?  You can help yourself get better. Feeling you have control helps you move forward. You may wonder if you ll be able to do things you did before. Be patient. Do your best to make the most of every day. Try to build relationships, be as active as you can, eat right and keep a sense of humor. Avoid smoking and drinking too much alcohol. Call your family doctor or clinic if you re not sure what to do. They can guide you to care or other services.    When should I get help?  Think about getting counseling if your sadness or frustration gets worse. Together with a trained counselor, you can talk about your problems adjusting to life after your hospital stay. You can come up with new ways to handle changes that give you more control. Your family doctor or care team can help you find a counselor.     Get help if you re thinking about hurting yourself. If you need help right away, call 911 or go to the nearest emergency room. You can also try the Crisis Text Line.    Crisis Text Line: 753-417 (http://www.crisistextline.org)  The Crisis Text Line serves anyone, in any crisis. It gives free, 24/7 support. Here's how it works:  Text HOME to 688285 from anywhere in the [place], anytime, about any type of crisis.  A live, trained Crisis Counselor will text you back quickly.  The volunteer Crisis Counselor can help you move from a  hot moment  to a  cool moment.  They can also help you work out a safety plan.

## 2023-01-08 ENCOUNTER — HEALTH MAINTENANCE LETTER (OUTPATIENT)
Age: 43
End: 2023-01-08

## 2023-03-31 ENCOUNTER — OFFICE VISIT (OUTPATIENT)
Dept: FAMILY MEDICINE | Facility: CLINIC | Age: 43
End: 2023-03-31
Payer: COMMERCIAL

## 2023-03-31 VITALS
DIASTOLIC BLOOD PRESSURE: 81 MMHG | SYSTOLIC BLOOD PRESSURE: 117 MMHG | HEART RATE: 89 BPM | OXYGEN SATURATION: 98 % | RESPIRATION RATE: 18 BRPM | WEIGHT: 315 LBS | HEIGHT: 72 IN | BODY MASS INDEX: 42.66 KG/M2 | TEMPERATURE: 97.9 F

## 2023-03-31 DIAGNOSIS — Z00.00 ENCOUNTER FOR PREVENTIVE CARE: Primary | ICD-10-CM

## 2023-03-31 DIAGNOSIS — R73.03 PREDIABETES: ICD-10-CM

## 2023-03-31 DIAGNOSIS — J39.2 THROAT IRRITATION: ICD-10-CM

## 2023-03-31 DIAGNOSIS — I10 BENIGN ESSENTIAL HYPERTENSION: ICD-10-CM

## 2023-03-31 PROBLEM — F10.21 HISTORY OF ALCOHOL DEPENDENCE (H): Status: ACTIVE | Noted: 2017-07-03

## 2023-03-31 LAB
CHOLEST SERPL-MCNC: 146 MG/DL
CREAT UR-MCNC: 160 MG/DL
FASTING STATUS PATIENT QL REPORTED: NO
HBA1C MFR BLD: 5.4 % (ref 0–5.6)
HDLC SERPL-MCNC: 31 MG/DL
LDLC SERPL CALC-MCNC: 73 MG/DL
MICROALBUMIN UR-MCNC: 16 MG/L
MICROALBUMIN/CREAT UR: 10 MG/G CR (ref 0–17)
NONHDLC SERPL-MCNC: 115 MG/DL
TRIGL SERPL-MCNC: 208 MG/DL

## 2023-03-31 PROCEDURE — 80061 LIPID PANEL: CPT | Performed by: PHYSICIAN ASSISTANT

## 2023-03-31 PROCEDURE — 36415 COLL VENOUS BLD VENIPUNCTURE: CPT | Performed by: PHYSICIAN ASSISTANT

## 2023-03-31 PROCEDURE — 99214 OFFICE O/P EST MOD 30 MIN: CPT | Mod: 25 | Performed by: PHYSICIAN ASSISTANT

## 2023-03-31 PROCEDURE — 99396 PREV VISIT EST AGE 40-64: CPT | Performed by: PHYSICIAN ASSISTANT

## 2023-03-31 PROCEDURE — 82570 ASSAY OF URINE CREATININE: CPT | Performed by: PHYSICIAN ASSISTANT

## 2023-03-31 PROCEDURE — 86803 HEPATITIS C AB TEST: CPT | Performed by: PHYSICIAN ASSISTANT

## 2023-03-31 PROCEDURE — 83036 HEMOGLOBIN GLYCOSYLATED A1C: CPT | Performed by: PHYSICIAN ASSISTANT

## 2023-03-31 PROCEDURE — 82043 UR ALBUMIN QUANTITATIVE: CPT | Performed by: PHYSICIAN ASSISTANT

## 2023-03-31 RX ORDER — LISINOPRIL 20 MG/1
20 TABLET ORAL DAILY
Qty: 90 TABLET | Refills: 3 | Status: SHIPPED | OUTPATIENT
Start: 2023-03-31 | End: 2024-06-14

## 2023-03-31 ASSESSMENT — ENCOUNTER SYMPTOMS
NERVOUS/ANXIOUS: 0
PALPITATIONS: 0
HEMATURIA: 0
ABDOMINAL PAIN: 0
PARESTHESIAS: 0
COUGH: 0
EYE PAIN: 0
DIARRHEA: 0
CONSTIPATION: 0
HEADACHES: 0
HEARTBURN: 0
CHILLS: 0
JOINT SWELLING: 0
HEMATOCHEZIA: 0
FEVER: 0
DIZZINESS: 0
MYALGIAS: 0
ARTHRALGIAS: 1
WEAKNESS: 0
SHORTNESS OF BREATH: 0
SORE THROAT: 1
NAUSEA: 0
DYSURIA: 0
FREQUENCY: 0

## 2023-03-31 ASSESSMENT — PAIN SCALES - GENERAL: PAINLEVEL: NO PAIN (0)

## 2023-03-31 NOTE — PROGRESS NOTES
SUBJECTIVE:   CC: Armando is an 42 year old who presents for preventative health visit.   Additional Questions 3/31/2023   Roomed by Lynne   Accompanied by Self     Patient has been advised of split billing requirements and indicates understanding: Yes  Healthy Habits:     Getting at least 3 servings of Calcium per day:  Yes    Bi-annual eye exam:  Yes    Dental care twice a year:  NO    Sleep apnea or symptoms of sleep apnea:  Sleep apnea    Diet:  Regular (no restrictions)    Frequency of exercise:  None    Taking medications regularly:  Yes    Medication side effects:  None    PHQ-2 Total Score: 0    Additional concerns today:  No    6-8 shots a night for about 5-6 yrs.   Quit in Oct.     Hypertension Follow-up      Do you check your blood pressure regularly outside of the clinic? No     Are you following a low salt diet? Yes    Are your blood pressures ever more than 140 on the top number (systolic) OR more   than 90 on the bottom number (diastolic), for example 140/90? No    5 yrs of throat irritation. Worse after COVID in Jan.   No dysphagia.  He states is more like he has clear his throat.  Occasionally gets heartburn symptoms with bloat bloating.  Overall has gotten better since he quit alcohol.  Denies any sneezing or runny nose or PND.    Today's PHQ-2 Score:   PHQ-2 ( 1999 [employer]) 3/31/2023   Q1: Little interest or pleasure in doing things 0   Q2: Feeling down, depressed or hopeless 0   PHQ-2 Score 0   PHQ-2 Total Score (12-17 Years)- Positive if 3 or more points; Administer PHQ-A if positive -   Q1: Little interest or pleasure in doing things Not at all   Q2: Feeling down, depressed or hopeless Not at all   PHQ-2 Score 0       Have you ever done Advance Care Planning? (For example, a Health Directive, POLST, or a discussion with a medical provider or your loved ones about your wishes): No, advance care planning information given to patient to review.  Patient declined advance care planning discussion at  this time.    Social History     Tobacco Use     Smoking status: Former     Packs/day: 0.10     Years: 15.00     Pack years: 1.50     Types: Cigarettes     Start date: 1/1/1996     Quit date: 1/1/2013     Years since quitting: 10.2     Smokeless tobacco: Never   Substance Use Topics     Alcohol use: Yes     Alcohol/week: 40.0 standard drinks     Types: 40 Standard drinks or equivalent per week     Comment: advised cutting down 7/3/17         Alcohol Use 3/31/2023   Prescreen: >3 drinks/day or >7 drinks/week? No       Last PSA: No results found for: PSA    Reviewed orders with patient. Reviewed health maintenance and updated orders accordingly - Yes  Lab work is in process  Labs reviewed in EPIC  BP Readings from Last 3 Encounters:   03/31/23 117/81   11/14/22 (!) 146/98   11/14/22 (!) 145/87    Wt Readings from Last 3 Encounters:   03/31/23 (!) 155.1 kg (342 lb)   05/19/22 (!) 163.7 kg (361 lb)   05/05/22 (!) 161.9 kg (357 lb)                  Patient Active Problem List   Diagnosis     Benign essential hypertension     Hyperlipidemia LDL goal <130     Hepatic steatosis     History of alcohol dependence (H)     AMADEO (obstructive sleep apnea) Severe     Restless leg     Prediabetes     Microalbuminuria     Morbid obesity with BMI of 45.0-49.9, adult (H)     Past Surgical History:   Procedure Laterality Date     CYST REMOVAL       SOFT TISSUE SURGERY      cyst removal       Social History     Tobacco Use     Smoking status: Former     Packs/day: 0.10     Years: 15.00     Pack years: 1.50     Types: Cigarettes     Start date: 1/1/1996     Quit date: 1/1/2013     Years since quitting: 10.2     Smokeless tobacco: Never   Substance Use Topics     Alcohol use: Yes     Alcohol/week: 40.0 standard drinks     Types: 40 Standard drinks or equivalent per week     Comment: advised cutting down 7/3/17     Family History   Problem Relation Age of Onset     Diabetes Mother      Hypertension Mother      C.A.D. Father 70     Snoring  Father      Cancer - colorectal Maternal Grandfather      Diabetes Paternal Grandmother      Cerebrovascular Disease Paternal Grandmother      Asthma No family hx of      Breast Cancer No family hx of      Prostate Cancer No family hx of      Thyroid Disease No family hx of      Lipids No family hx of          Current Outpatient Medications   Medication Sig Dispense Refill     albuterol (PROAIR HFA/PROVENTIL HFA/VENTOLIN HFA) 108 (90 Base) MCG/ACT inhaler Inhale 2 puffs into the lungs every 4 hours as needed for shortness of breath, wheezing or cough 18 g 1     aspirin 81 MG tablet Take 1 tablet by mouth daily. 30 tablet 3     Blood Pressure Monitor KIT Use to check blood pressure daily 1 kit 0     lisinopril (ZESTRIL) 20 MG tablet Take 1 tablet (20 mg) by mouth daily 90 tablet 3     No Known Allergies    Reviewed and updated as needed this visit by clinical staff   Tobacco  Allergies  Meds  Problems  Med Hx  Surg Hx  Fam Hx          Reviewed and updated as needed this visit by Provider   Tobacco  Allergies  Meds  Problems  Med Hx  Surg Hx  Fam Hx           Past Medical History:   Diagnosis Date     Fatty liver     CT scan 2015 w/ hepatic steatosis     GERD (gastroesophageal reflux disease)      Hypertension      AMADEO (obstructive sleep apnea)     Does not use anything, encouraged to restart cpap 1/21.     Reflux      Restless legs       Past Surgical History:   Procedure Laterality Date     CYST REMOVAL       SOFT TISSUE SURGERY      cyst removal       Review of Systems   Constitutional: Negative for chills and fever.   HENT: Positive for sore throat. Negative for congestion, ear pain and hearing loss.    Eyes: Negative for pain and visual disturbance.   Respiratory: Negative for cough and shortness of breath.    Cardiovascular: Negative for chest pain, palpitations and peripheral edema.   Gastrointestinal: Negative for abdominal pain, constipation, diarrhea, heartburn, hematochezia and nausea.    Genitourinary: Negative for dysuria, frequency, genital sores, hematuria, impotence, penile discharge and urgency.   Musculoskeletal: Positive for arthralgias. Negative for joint swelling and myalgias.   Skin: Negative for rash.   Neurological: Negative for dizziness, weakness, headaches and paresthesias.   Psychiatric/Behavioral: Negative for mood changes. The patient is not nervous/anxious.      OBJECTIVE:   /81   Pulse 89   Temp 97.9  F (36.6  C) (Tympanic)   Resp 18   Ht 1.829 m (6')   Wt (!) 155.1 kg (342 lb)   SpO2 98%   BMI 46.38 kg/m      Physical Exam  GENERAL: healthy, alert and no distress  EYES: Eyes grossly normal to inspection, PERRL and conjunctivae and sclerae normal  HENT: ear canals and TM's normal, nose and mouth without ulcers or lesions  NECK: no adenopathy, no asymmetry, masses, or scars and thyroid normal to palpation  RESP: lungs clear to auscultation - no rales, rhonchi or wheezes  CV: regular rate and rhythm, normal S1 S2, no S3 or S4, no murmur, click or rub, no peripheral edema and peripheral pulses strong  ABDOMEN: soft, nontender, no hepatosplenomegaly, no masses and bowel sounds normal   (male): normal male genitalia without lesions or urethral discharge, no hernia  MS: no gross musculoskeletal defects noted, no edema  SKIN: no suspicious lesions or rashes  NEURO: Normal strength and tone, mentation intact and speech normal  PSYCH: mentation appears normal, affect normal/bright      Diagnostic Test Results:  Labs reviewed in Epic    ASSESSMENT/PLAN:   .    ICD-10-CM    1. Encounter for preventive care  Z00.00 Hepatitis C Screen Reflex to HCV RNA Quant and Genotype     Albumin Random Urine Quantitative with Creat Ratio     Lipid panel reflex to direct LDL Fasting     Hepatitis C Screen Reflex to HCV RNA Quant and Genotype     Albumin Random Urine Quantitative with Creat Ratio     Lipid panel reflex to direct LDL Fasting      2. Prediabetes  R73.03 Hemoglobin A1c      Hemoglobin A1c     OFFICE/OUTPT VISIT,EST,LEVL III      3. Benign essential hypertension  I10 lisinopril (ZESTRIL) 20 MG tablet     OFFICE/OUTPT VISIT,EST,LEVL III      4. Throat irritation  J39.2 OFFICE/OUTPT VISIT,EST,LEVL III      Work on Healthy diet and exercise. Getting heart rate elevated for 30 mins most days of week.  Labs pending  3.medical conditions are stable. meds refilled. If con't to lose weight may decrease blood pressure dosage.   Recheck yearly dependant on labs.   4. Consider Omeprazole for 8 wks if not improving follow up  .   Dietary changes discussed.     COUNSELING:   Reviewed preventive health counseling, as reflected in patient instructions       Regular exercise       Healthy diet/nutrition       Vision screening        He reports that he quit smoking about 10 years ago. His smoking use included cigarettes. He started smoking about 27 years ago. He has a 1.50 pack-year smoking history. He has never used smokeless tobacco.            José Kolb PA-C  [facility]

## 2023-04-03 LAB — HCV AB SERPL QL IA: NONREACTIVE

## 2023-04-03 NOTE — RESULT ENCOUNTER NOTE
Mr. Domingo,    All of your labs were normal/near normal for you.    Please contact the clinic if you have additional questions.  Thank you.    Sincerely,    José Kolb PA-C

## 2023-04-05 ENCOUNTER — OFFICE VISIT (OUTPATIENT)
Dept: URGENT CARE | Facility: URGENT CARE | Age: 43
End: 2023-04-05
Payer: COMMERCIAL

## 2023-04-05 VITALS
WEIGHT: 315 LBS | TEMPERATURE: 97.9 F | RESPIRATION RATE: 14 BRPM | OXYGEN SATURATION: 99 % | HEART RATE: 74 BPM | SYSTOLIC BLOOD PRESSURE: 133 MMHG | BODY MASS INDEX: 45.98 KG/M2 | DIASTOLIC BLOOD PRESSURE: 83 MMHG

## 2023-04-05 DIAGNOSIS — K64.4 BLEEDING EXTERNAL HEMORRHOIDS: Primary | ICD-10-CM

## 2023-04-05 PROCEDURE — 99214 OFFICE O/P EST MOD 30 MIN: CPT | Performed by: FAMILY MEDICINE

## 2023-04-05 NOTE — PROGRESS NOTES
Chief complaint: hemorroid     Accompanied by wife    5 days ago felt a bump and pain in the rectal area  Has had hemorroids in the past and feels that it what it is  Has had them in the past     That night had a few drinks went to bed and woke up and at 3 am started having vomiting and diarrhea   Persistent until the next day     Seemed to slow down 3 days ago  Finally calmed down 2 days ago    But then yesterday the stool was darkish greenish brown was a bit harder and had to strain a bit and had some rectal pain -but no blood     But then today at 2 pm when he had a bowel movement there was a lot of blood     Since then seems to be a bit better    Since then is not profusely bleeding but when he wipes would still have some blood    There is also blood in the underwear     Pain is now more of an annoyance     No Known Allergies    Past Medical History:   Diagnosis Date     Fatty liver     CT scan 2015 w/ hepatic steatosis     GERD (gastroesophageal reflux disease)      Hypertension      AMADEO (obstructive sleep apnea)     Does not use anything, encouraged to restart cpap 1/21.     Reflux      Restless legs        albuterol (PROAIR HFA/PROVENTIL HFA/VENTOLIN HFA) 108 (90 Base) MCG/ACT inhaler, Inhale 2 puffs into the lungs every 4 hours as needed for shortness of breath, wheezing or cough  aspirin 81 MG tablet, Take 1 tablet by mouth daily.  Blood Pressure Monitor KIT, Use to check blood pressure daily  lisinopril (ZESTRIL) 20 MG tablet, Take 1 tablet (20 mg) by mouth daily    No current facility-administered medications on file prior to visit.      Social History     Tobacco Use     Smoking status: Former     Packs/day: 0.10     Years: 15.00     Pack years: 1.50     Types: Cigarettes     Start date: 1/1/1996     Quit date: 1/1/2013     Years since quitting: 10.2     Smokeless tobacco: Never   Vaping Use     Vaping status: Never Used   Substance Use Topics     Alcohol use: Yes     Alcohol/week: 40.0 standard drinks  of alcohol     Types: 40 Standard drinks or equivalent per week     Comment: advised cutting down 7/3/17     Drug use: No       ROS:   review of systems negative except for noted above.       OBJECTIVE:  /83   Pulse 74   Temp 97.9  F (36.6  C) (Oral)   Resp 14   Wt (!) 153.8 kg (339 lb)   SpO2 99%   BMI 45.98 kg/m     General:   awake, alert, and cooperative.  NAD.   Head: Normocephalic, atraumatic.  Eyes: Conjunctiva clear  Rectum: large external hemorrhoid bleeding nontender  Bleeding is very slight oozing with pressure.  Neuro: Alert and oriented - normal speech.  MS: Using extremities freely  PSYCH:  Normal affect, normal speech  SKIN: no obvious rashes    ASSESSMENT:    ICD-10-CM    1. Bleeding external hemorrhoids  K64.4 Adult General Surg Referral          PLAN:   Supportive treatment    Expect bleeding to continue to slow down over the next 24 hours   To ER if bleeding worsens or severe   See AVS  Referred to General Surgery for follow up   Advised about symptoms which might herald more serious problems.        Angeles Harvey MD

## 2023-06-12 ENCOUNTER — OFFICE VISIT (OUTPATIENT)
Dept: SURGERY | Facility: CLINIC | Age: 43
End: 2023-06-12
Payer: COMMERCIAL

## 2023-06-12 VITALS — SYSTOLIC BLOOD PRESSURE: 133 MMHG | DIASTOLIC BLOOD PRESSURE: 84 MMHG | HEART RATE: 84 BPM

## 2023-06-12 DIAGNOSIS — K64.4 EXTERNAL HEMORRHOIDS: Primary | ICD-10-CM

## 2023-06-12 PROCEDURE — 99244 OFF/OP CNSLTJ NEW/EST MOD 40: CPT | Performed by: SURGERY

## 2023-06-12 NOTE — LETTER
"    6/12/2023         RE: Armando Domingo  [id number] [facility] [id number]        Dear Colleague,    Thank you for referring your patient, Armando Domingo, to the [facility]. Please see a copy of my visit note below.    Patient seen in consultation for bleeding external hemorrhoids by Angeles Harvey    HPI:  Patient is a 42 year old male  with complaints of painful external hemorrhoids  The patient noticed the symptoms about 2 months ago.    Was at time of a stomach flu with diarrhea and frequency, felt pain then \"burst\" with some bleeding after 3-4 days, then another 3-4 days before subsided. Likely had once or twice in past but didn't bleed then.  In general bowel movements are softer, does not feel constipated or need to strain often  Currently feeling normal and no bleeding  time makes the episode better.  Patient has not family history of hemorrhoid problems. Grandfather with colon cancer    Review Of Systems    Skin: negative  Ears/Nose/Throat: negative  Respiratory: some wheezing after bonnie, maybe with allergies and using his inhaler bit more  Cardiovascular: negative  Gastrointestinal: as above  Genitourinary: negative  Musculoskeletal: negative  Neurologic: negative  Hematologic/Lymphatic/Immunologic: negative  Endocrine: negative      Past Medical History:   Diagnosis Date     Fatty liver     CT scan 2015 w/ hepatic steatosis     GERD (gastroesophageal reflux disease)      Hypertension      AMADEO (obstructive sleep apnea)     Does not use anything, encouraged to restart cpap 1/21.     Reflux      Restless legs        Past Surgical History:   Procedure Laterality Date     CYST REMOVAL       SOFT TISSUE SURGERY      cyst removal   in teens    Social History     Socioeconomic History     Marital status:      Spouse name: Not on file     Number of children: Not on file     Years of education: Not on file     Highest education level: Not on file   Occupational " History     Not on file   Tobacco Use     Smoking status: Former     Packs/day: 0.10     Years: 15.00     Pack years: 1.50     Types: Cigarettes     Start date: 1/1/1996     Quit date: 1/1/2013     Years since quitting: 10.4     Smokeless tobacco: Never   Vaping Use     Vaping status: Never Used   Substance and Sexual Activity     Alcohol use: Yes     Alcohol/week: 40.0 standard drinks of alcohol     Types: 40 Standard drinks or equivalent per week     Comment: advised cutting down 7/3/17     Drug use: No     Sexual activity: Yes     Partners: Female     Comment: none   Other Topics Concern     Parent/sibling w/ CABG, MI or angioplasty before 65F 55M? Not Asked   Social History Narrative     Not on file     Social Determinants of Health     Financial Resource Strain: Not on file   Food Insecurity: Not on file   Transportation Needs: Not on file   Physical Activity: Not on file   Stress: Not on file   Social Connections: Not on file   Intimate Partner Violence: Not on file   Housing Stability: Not on file       Current Outpatient Medications   Medication Sig Dispense Refill     albuterol (PROAIR HFA/PROVENTIL HFA/VENTOLIN HFA) 108 (90 Base) MCG/ACT inhaler Inhale 2 puffs into the lungs every 4 hours as needed for shortness of breath, wheezing or cough 18 g 1     aspirin 81 MG tablet Take 1 tablet by mouth daily. 30 tablet 3     Blood Pressure Monitor KIT Use to check blood pressure daily 1 kit 0     lisinopril (ZESTRIL) 20 MG tablet Take 1 tablet (20 mg) by mouth daily 90 tablet 3   inhaler was with covid in dec for wheezing    Medications and history reviewed    Physical exam:  Vitals: /84   Pulse 84   BMI= There is no height or weight on file to calculate BMI.    Constitutional: healthy, alert and no distress  Head: Normocephalic. No masses, lesions, tenderness or abnormalities  Gastrointestinal: positive findings: obese  Rectal: Mild swelling of external hemorrhoidal columns no acute thrombosis, mild  tenderness towards the left.  There is a very small skin scab left lateral likely from previous thrombosis/ulceration but this has healed up well.  Normal sphincter tone with digital exam no palpable masses.  Anoscope inserted with no internal abnormalities.  Patient able to get up on table without difficulty.      Assessment:     ICD-10-CM    1. External hemorrhoids  K64.4         Plan: Likely had external hemorrhoidal thrombosis 2 months ago that has now fully resolved.  Was related to extra time on the toilet from the diarrhea and frequency with the stomach bug at that time.  Typically does not feel like he has constipation or straining.  Currently nothing on exam today I think would warrant hemorrhoidectomy.  Recommended conservative measures and lifestyle changes with keeping stool soft and regular with high-fiber foods for possible fiber supplementation, limiting time on the toilet.  If hemorrhoid issues start becoming more of a frequent problem then could reevaluate for possible surgery.  Patient questions answered.  Follow-up as needed.    Deven Whitley MD        Again, thank you for allowing me to participate in the care of your patient.        Sincerely,        Deven Whitley MD

## 2023-06-12 NOTE — PROGRESS NOTES
"Patient seen in consultation for bleeding external hemorrhoids by Angeles Harvey    HPI:  Patient is a 42 year old male  with complaints of painful external hemorrhoids  The patient noticed the symptoms about 2 months ago.    Was at time of a stomach flu with diarrhea and frequency, felt pain then \"burst\" with some bleeding after 3-4 days, then another 3-4 days before subsided. Likely had once or twice in past but didn't bleed then.  In general bowel movements are softer, does not feel constipated or need to strain often  Currently feeling normal and no bleeding  time makes the episode better.  Patient has not family history of hemorrhoid problems. Grandfather with colon cancer    Review Of Systems    Skin: negative  Ears/Nose/Throat: negative  Respiratory: some wheezing after bonnie, maybe with allergies and using his inhaler bit more  Cardiovascular: negative  Gastrointestinal: as above  Genitourinary: negative  Musculoskeletal: negative  Neurologic: negative  Hematologic/Lymphatic/Immunologic: negative  Endocrine: negative      Past Medical History:   Diagnosis Date     Fatty liver     CT scan 2015 w/ hepatic steatosis     GERD (gastroesophageal reflux disease)      Hypertension      AMADEO (obstructive sleep apnea)     Does not use anything, encouraged to restart cpap 1/21.     Reflux      Restless legs        Past Surgical History:   Procedure Laterality Date     CYST REMOVAL       SOFT TISSUE SURGERY      cyst removal   in teens    Social History     Socioeconomic History     Marital status:      Spouse name: Not on file     Number of children: Not on file     Years of education: Not on file     Highest education level: Not on file   Occupational History     Not on file   Tobacco Use     Smoking status: Former     Packs/day: 0.10     Years: 15.00     Pack years: 1.50     Types: Cigarettes     Start date: 1/1/1996     Quit date: 1/1/2013     Years since quitting: 10.4     Smokeless tobacco: Never "   Vaping Use     Vaping status: Never Used   Substance and Sexual Activity     Alcohol use: Yes     Alcohol/week: 40.0 standard drinks of alcohol     Types: 40 Standard drinks or equivalent per week     Comment: advised cutting down 7/3/17     Drug use: No     Sexual activity: Yes     Partners: Female     Comment: none   Other Topics Concern     Parent/sibling w/ CABG, MI or angioplasty before 65F 55M? Not Asked   Social History Narrative     Not on file     Social Determinants of Health     Financial Resource Strain: Not on file   Food Insecurity: Not on file   Transportation Needs: Not on file   Physical Activity: Not on file   Stress: Not on file   Social Connections: Not on file   Intimate Partner Violence: Not on file   Housing Stability: Not on file       Current Outpatient Medications   Medication Sig Dispense Refill     albuterol (PROAIR HFA/PROVENTIL HFA/VENTOLIN HFA) 108 (90 Base) MCG/ACT inhaler Inhale 2 puffs into the lungs every 4 hours as needed for shortness of breath, wheezing or cough 18 g 1     aspirin 81 MG tablet Take 1 tablet by mouth daily. 30 tablet 3     Blood Pressure Monitor KIT Use to check blood pressure daily 1 kit 0     lisinopril (ZESTRIL) 20 MG tablet Take 1 tablet (20 mg) by mouth daily 90 tablet 3   inhaler was with covid in dec for wheezing    Medications and history reviewed    Physical exam:  Vitals: /84   Pulse 84   BMI= There is no height or weight on file to calculate BMI.    Constitutional: healthy, alert and no distress  Head: Normocephalic. No masses, lesions, tenderness or abnormalities  Gastrointestinal: positive findings: obese  Rectal: Mild swelling of external hemorrhoidal columns no acute thrombosis, mild tenderness towards the left.  There is a very small skin scab left lateral likely from previous thrombosis/ulceration but this has healed up well.  Normal sphincter tone with digital exam no palpable masses.  Anoscope inserted with no internal  abnormalities.  Patient able to get up on table without difficulty.      Assessment:     ICD-10-CM    1. External hemorrhoids  K64.4         Plan: Likely had external hemorrhoidal thrombosis 2 months ago that has now fully resolved.  Was related to extra time on the toilet from the diarrhea and frequency with the stomach bug at that time.  Typically does not feel like he has constipation or straining.  Currently nothing on exam today I think would warrant hemorrhoidectomy.  Recommended conservative measures and lifestyle changes with keeping stool soft and regular with high-fiber foods for possible fiber supplementation, limiting time on the toilet.  If hemorrhoid issues start becoming more of a frequent problem then could reevaluate for possible surgery.  Patient questions answered.  Follow-up as needed.    Deven Whitley MD

## 2023-06-28 ENCOUNTER — ANCILLARY PROCEDURE (OUTPATIENT)
Dept: GENERAL RADIOLOGY | Facility: CLINIC | Age: 43
End: 2023-06-28
Attending: FAMILY MEDICINE
Payer: COMMERCIAL

## 2023-06-28 ENCOUNTER — OFFICE VISIT (OUTPATIENT)
Dept: URGENT CARE | Facility: URGENT CARE | Age: 43
End: 2023-06-28
Payer: COMMERCIAL

## 2023-06-28 VITALS
BODY MASS INDEX: 46.99 KG/M2 | TEMPERATURE: 98.4 F | RESPIRATION RATE: 12 BRPM | SYSTOLIC BLOOD PRESSURE: 125 MMHG | DIASTOLIC BLOOD PRESSURE: 82 MMHG | HEART RATE: 77 BPM | WEIGHT: 315 LBS | OXYGEN SATURATION: 97 %

## 2023-06-28 DIAGNOSIS — M25.552 HIP PAIN, LEFT: Primary | ICD-10-CM

## 2023-06-28 DIAGNOSIS — M25.552 HIP PAIN, LEFT: ICD-10-CM

## 2023-06-28 PROCEDURE — 99213 OFFICE O/P EST LOW 20 MIN: CPT | Performed by: FAMILY MEDICINE

## 2023-06-28 PROCEDURE — 73502 X-RAY EXAM HIP UNI 2-3 VIEWS: CPT | Mod: TC | Performed by: RADIOLOGY

## 2023-06-28 NOTE — PROGRESS NOTES
URGENT CARE    ASSESSMENT AND PLAN:      ICD-10-CM    1. Hip pain, left  M25.552 XR Hip Left 2-3 Views          Differential Diagnosis include but not limited to bursitis, sprain/strain, fracture and dislocation of hip.  X-ray today does not show arthritis or acute deformity.  If radiology report should show anything differently we will contact patient and let him know.  Suspect muscle strain and will have him monitor symptoms and utilize Tylenol/ibuprofen, ice, and rest as needed.        Follow up with primary care provider with any problems, questions or concerns or if symptoms worsen or fail to improve. Patient verbalized understanding and is agreeable to plan. The patient was discharged ambulatory and in stable condition.    SUBJECTIVE:  Chief Complaint   Patient presents with     Musculoskeletal Problem     Left hip pain       Armando Domingo is a 42 year old male presents with a chief complaint of left hip pain x2 days.  Patient reports having lateral hip pain that radiates inwards that is intermittent.  He denies known trauma/injury directly impacting hip but does recall going down the stairs and knee twisting at that time.  Pain exacerbated by movement and stairs  Relieved by rest and laying still.  He treated it initially with Tylenol.     Denies trauma/injury, fever/chills, numbness/tingling, loss of strength of extremity, or back pain.    Objective    /82   Pulse 77   Temp 98.4  F (36.9  C) (Tympanic)   Resp 12   Wt (!) 157.2 kg (346 lb 8 oz)   SpO2 97%   BMI 46.99 kg/m      Physical Exam       Gen: healthy,alert,no distress  Extremity: left hip has point tenderness along anterior hip.   There is not compromise to the distal circulation.  Pulses are +2 and CRT is brisk  Limited ROM due to pain.  NASRIN -.   Gait is stable without antalgic noted.

## 2023-09-23 ENCOUNTER — OFFICE VISIT (OUTPATIENT)
Dept: URGENT CARE | Facility: URGENT CARE | Age: 43
End: 2023-09-23
Payer: COMMERCIAL

## 2023-09-23 VITALS
BODY MASS INDEX: 48.15 KG/M2 | SYSTOLIC BLOOD PRESSURE: 126 MMHG | OXYGEN SATURATION: 97 % | HEART RATE: 91 BPM | RESPIRATION RATE: 18 BRPM | TEMPERATURE: 98.5 F | DIASTOLIC BLOOD PRESSURE: 84 MMHG | WEIGHT: 315 LBS

## 2023-09-23 DIAGNOSIS — J01.90 ACUTE SINUSITIS WITH SYMPTOMS > 10 DAYS: Primary | ICD-10-CM

## 2023-09-23 DIAGNOSIS — Z91.09 ENVIRONMENTAL ALLERGIES: ICD-10-CM

## 2023-09-23 PROCEDURE — 99213 OFFICE O/P EST LOW 20 MIN: CPT | Performed by: FAMILY MEDICINE

## 2023-09-23 RX ORDER — AMOXICILLIN 500 MG/1
1000 CAPSULE ORAL 2 TIMES DAILY
Qty: 56 CAPSULE | Refills: 0 | Status: SHIPPED | OUTPATIENT
Start: 2023-09-23 | End: 2023-10-07

## 2023-09-23 NOTE — PATIENT INSTRUCTIONS
Take the antibiotics    Stay well hydrated    Advise daily loratadine ( claritin ) type medication over the counter    Follow up as needed based on symptoms    Be seen promptly if symptoms acutely worsen

## 2023-09-23 NOTE — PROGRESS NOTES
"Armando Domingo is a 43 year old male who comes in today for symptoms about a month    Worse the last few days    Odd smell    Campfire burning type smell    Some facial pain and pressure    Sinus    Been sick a lot this year with colds    Wondering about allergies    Sneeze a lot in am     Desk/ computer work    No chemical exposure    No history of asthma    Got albuterol inhaler when had covid    No sinus history     Tylenol to help sleep    No ear symptoms    Maybe some nasal \"crud\" in nose    Cough off and on       ROS    Physical Exam  Constitutional:       Appearance: He is well-developed.   HENT:      Head: Normocephalic and atraumatic.      Right Ear: Tympanic membrane, ear canal and external ear normal.      Left Ear: Tympanic membrane, ear canal and external ear normal.      Nose: Nose normal.      Mouth/Throat:      Comments: Slight irritation of the throat; some sinus tenderness  Eyes:      Conjunctiva/sclera: Conjunctivae normal.   Neck:      Vascular: No carotid bruit.   Cardiovascular:      Rate and Rhythm: Normal rate and regular rhythm.      Heart sounds: Normal heart sounds.   Pulmonary:      Effort: Pulmonary effort is normal. No respiratory distress.      Breath sounds: Normal breath sounds.   Neurological:      Mental Status: He is alert and oriented to person, place, and time.      Cranial Nerves: No cranial nerve deficit.   Psychiatric:         Speech: Speech normal.         Behavior: Behavior normal.     No submandib tenderness    Radials  symmetric    No edema    ASSESSMENT / PLAN:  (J01.90) Acute sinusitis with symptoms > 10 days  (primary encounter diagnosis)  Comment: given duration and worsening trajectory of symptoms,reasonable to treat with antibiotics   Plan: amoxicillin (AMOXIL) 500 MG capsule             (Z91.09) Environmental allergies  Comment: may  be allergy component   Plan: loratadine daily      Follow up as needed based on symptoms     Be seen promptly if symptoms acutely " worsen       I reviewed the patient's medications, allergies, medical history, family history, and social history.    Jamey Gregg MD

## 2024-06-14 ENCOUNTER — OFFICE VISIT (OUTPATIENT)
Dept: FAMILY MEDICINE | Facility: CLINIC | Age: 44
End: 2024-06-14
Payer: COMMERCIAL

## 2024-06-14 VITALS
TEMPERATURE: 97 F | DIASTOLIC BLOOD PRESSURE: 86 MMHG | RESPIRATION RATE: 16 BRPM | HEART RATE: 89 BPM | WEIGHT: 315 LBS | HEIGHT: 72 IN | BODY MASS INDEX: 42.66 KG/M2 | SYSTOLIC BLOOD PRESSURE: 136 MMHG | OXYGEN SATURATION: 97 %

## 2024-06-14 DIAGNOSIS — I10 BENIGN ESSENTIAL HYPERTENSION: ICD-10-CM

## 2024-06-14 DIAGNOSIS — F10.10 ALCOHOL ABUSE: ICD-10-CM

## 2024-06-14 DIAGNOSIS — G47.33 OSA (OBSTRUCTIVE SLEEP APNEA): ICD-10-CM

## 2024-06-14 DIAGNOSIS — E66.01 MORBID OBESITY (H): ICD-10-CM

## 2024-06-14 DIAGNOSIS — Z00.00 ROUTINE GENERAL MEDICAL EXAMINATION AT A HEALTH CARE FACILITY: Primary | ICD-10-CM

## 2024-06-14 DIAGNOSIS — K76.0 FATTY LIVER: ICD-10-CM

## 2024-06-14 LAB
ALBUMIN SERPL BCG-MCNC: 4.3 G/DL (ref 3.5–5.2)
ALP SERPL-CCNC: 58 U/L (ref 40–150)
ALT SERPL W P-5'-P-CCNC: 31 U/L (ref 0–70)
ANION GAP SERPL CALCULATED.3IONS-SCNC: 9 MMOL/L (ref 7–15)
AST SERPL W P-5'-P-CCNC: 30 U/L (ref 0–45)
BILIRUB SERPL-MCNC: 0.6 MG/DL
BUN SERPL-MCNC: 16.2 MG/DL (ref 6–20)
CALCIUM SERPL-MCNC: 9.2 MG/DL (ref 8.6–10)
CHLORIDE SERPL-SCNC: 103 MMOL/L (ref 98–107)
CHOLEST SERPL-MCNC: 150 MG/DL
CREAT SERPL-MCNC: 1.13 MG/DL (ref 0.67–1.17)
DEPRECATED HCO3 PLAS-SCNC: 24 MMOL/L (ref 22–29)
EGFRCR SERPLBLD CKD-EPI 2021: 84 ML/MIN/1.73M2
FASTING STATUS PATIENT QL REPORTED: YES
FASTING STATUS PATIENT QL REPORTED: YES
GLUCOSE SERPL-MCNC: 102 MG/DL (ref 70–99)
HBA1C MFR BLD: 5.5 % (ref 0–5.6)
HDLC SERPL-MCNC: 33 MG/DL
LDLC SERPL CALC-MCNC: 101 MG/DL
NONHDLC SERPL-MCNC: 117 MG/DL
POTASSIUM SERPL-SCNC: 4.4 MMOL/L (ref 3.4–5.3)
PROT SERPL-MCNC: 7.6 G/DL (ref 6.4–8.3)
SODIUM SERPL-SCNC: 136 MMOL/L (ref 135–145)
TRIGL SERPL-MCNC: 80 MG/DL

## 2024-06-14 PROCEDURE — 83036 HEMOGLOBIN GLYCOSYLATED A1C: CPT | Performed by: FAMILY MEDICINE

## 2024-06-14 PROCEDURE — 99396 PREV VISIT EST AGE 40-64: CPT | Performed by: FAMILY MEDICINE

## 2024-06-14 PROCEDURE — 99213 OFFICE O/P EST LOW 20 MIN: CPT | Mod: 25 | Performed by: FAMILY MEDICINE

## 2024-06-14 PROCEDURE — 80053 COMPREHEN METABOLIC PANEL: CPT | Performed by: FAMILY MEDICINE

## 2024-06-14 PROCEDURE — 36415 COLL VENOUS BLD VENIPUNCTURE: CPT | Performed by: FAMILY MEDICINE

## 2024-06-14 PROCEDURE — 80061 LIPID PANEL: CPT | Performed by: FAMILY MEDICINE

## 2024-06-14 RX ORDER — LISINOPRIL 20 MG/1
20 TABLET ORAL DAILY
Qty: 90 TABLET | Refills: 3 | Status: SHIPPED | OUTPATIENT
Start: 2024-06-14

## 2024-06-14 SDOH — HEALTH STABILITY: PHYSICAL HEALTH: ON AVERAGE, HOW MANY DAYS PER WEEK DO YOU ENGAGE IN MODERATE TO STRENUOUS EXERCISE (LIKE A BRISK WALK)?: 2 DAYS

## 2024-06-14 ASSESSMENT — SOCIAL DETERMINANTS OF HEALTH (SDOH): HOW OFTEN DO YOU GET TOGETHER WITH FRIENDS OR RELATIVES?: TWICE A WEEK

## 2024-06-14 ASSESSMENT — PAIN SCALES - GENERAL: PAINLEVEL: NO PAIN (0)

## 2024-06-14 NOTE — PROGRESS NOTES
Preventive Care Visit  [facility]  Tasha Coles MD, Family Medicine  Jun 14, 2024      Assessment & Plan     Routine general medical examination at a health care facility  Preventive care reviewed and updated.    - Lipid panel reflex to direct LDL Fasting; Future  - Hemoglobin A1c; Future  - Comprehensive metabolic panel; Future  - Lipid panel reflex to direct LDL Fasting  - Hemoglobin A1c  - Comprehensive metabolic panel    Benign essential hypertension    - lisinopril (ZESTRIL) 20 MG tablet; Take 1 tablet (20 mg) by mouth daily    Morbid obesity (H)  Body mass index is 48.01 kg/m .  Wt Readings from Last 4 Encounters:   06/14/24 (!) 160.6 kg (354 lb)   09/23/23 (!) 161 kg (355 lb)   06/28/23 (!) 157.2 kg (346 lb 8 oz)   04/05/23 (!) 153.8 kg (339 lb)     Contributes to hypertension, and fatty liver  Patient will significantly benefit from weight loss.  Discussed GLP-1, he will think about it  Fatty liver  Noted on CT scan abdomen in 2015.  Associated with morbid obesity  Recommended aggressive weight loss  Discussed GLP-1 like Zepbound and Wegovy-he declined at this time, and will focus on decreasing alcohol intake  Alcohol abuse  Drinks about 6 drinks 3 to 5 days/week.  Follow-up in 3 months to discuss treatment option  Discussed naltrexone, he declined at this time and he will try to quit on his own if not successful will consider naltrexone  AMADEO (obstructive sleep apnea) Severe  Intolerant to CPAP  Advised to follow-up with sleep medicine to discuss other options          BMI  Estimated body mass index is 48.01 kg/m  as calculated from the following:    Height as of this encounter: 1.829 m (6').    Weight as of this encounter: 160.6 kg (354 lb).   Weight management plan: Discussed healthy diet and exercise guidelines    Counseling  Appropriate preventive services were discussed with this patient, including applicable screening as appropriate for fall prevention, nutrition, physical  activity, Tobacco-use cessation, weight loss and cognition.  Checklist reviewing preventive services available has been given to the patient.  Reviewed patient's diet, addressing concerns and/or questions.   He is at risk for lack of exercise and has been provided with information to increase physical activity for the benefit of his well-being.   The patient was instructed to see the dentist every 6 months.   The patient reports drinking more than 3 alcoholic drinks per day and/or more than 7 drhnks per week. The patient was counseled and given information about possible harmful effects of excessive alcohol intake.    Work on weight loss  Regular exercise    Guillermo Roberts is a 43 year old, presenting for the following:  Physical        6/14/2024     7:01 AM   Additional Questions   Roomed by chanda   Accompanied by self         6/14/2024     7:01 AM   Patient Reported Additional Medications   Patient reports taking the following new medications no        Health Care Directive  Patient does not have a Health Care Directive or Living Will: Discussed advance care planning with patient; however, patient declined at this time.    HPI    Wt Readings from Last 4 Encounters:   06/14/24 (!) 160.6 kg (354 lb)   09/23/23 (!) 161 kg (355 lb)   06/28/23 (!) 157.2 kg (346 lb 8 oz)   04/05/23 (!) 153.8 kg (339 lb)       Preventive -     Immunization History   Administered Date(s) Administered    COVID-19 MONOVALENT 12+ (Pfizer) 03/26/2021, 04/16/2021, 01/11/2022    HIB (PRP-T) 02/05/1986    Historical DTP/aP 1982, 1982, 05/08/1984, 05/25/1985    Influenza (IIV3) PF 10/25/2012    Influenza Vaccine 18-64 (Flublok) 11/08/2019    Influenza Vaccine >6 months,quad, PF 11/19/2014, 09/15/2015, 10/05/2017, 02/01/2019, 09/15/2020    Influenza, seasonal, injectable, PF 10/25/2012    MMR 11/11/1983    OPV, trivalent, live 1982, 1982, 05/08/1984    TDAP Vaccine (Adacel) 11/03/2008, 02/01/2019       - Colon CA  screen: Colonoscopy, age 45-75 every 10 years or FIT every year or Cologuard every 3 years   No fam hx of colon cancer     -lipids screen: ordered     Diabetes screen: ordered                   6/14/2024   General Health   How would you rate your overall physical health? (!) FAIR   Feel stress (tense, anxious, or unable to sleep) To some extent   (!) STRESS CONCERN      6/14/2024   Nutrition   Three or more servings of calcium each day? Yes   Diet: Low salt   How many servings of fruit and vegetables per day? (!) 2-3   How many sweetened beverages each day? 0-1         6/14/2024   Exercise   Days per week of moderate/strenous exercise 2 days   (!) EXERCISE CONCERN      6/14/2024   Social Factors   Frequency of gathering with friends or relatives Twice a week   Worry food won't last until get money to buy more No   Food not last or not have enough money for food? No   Do you have housing?  Yes   Are you worried about losing your housing? No   Lack of transportation? No   Unable to get utilities (heat,electricity)? No         6/14/2024   Dental   Dentist two times every year? (!) NO         6/14/2024   TB Screening   Were you born outside of the [place]? No         Today's PHQ-2 Score:       6/14/2024     7:08 AM   PHQ-2 ( 1999 Pfizer)   Q1: Little interest or pleasure in doing things 0   Q2: Feeling down, depressed or hopeless 0   PHQ-2 Score 0   Q1: Little interest or pleasure in doing things Not at all   Q2: Feeling down, depressed or hopeless Not at all   PHQ-2 Score 0     SH:    Marital status:    Kids: step kids 2   Employment: IT   Exercise: no   Tobacco: no   Etoh: yes - 3-5 days per week - 6 drinks   Recreational drugs: no           6/14/2024   Substance Use   Alcohol more than 3/day or more than 7/wk Yes   How often do you have a drink containing alcohol 2 to 3 times a week   How many alcohol drinks on typical day 5 or 6   How often do you have 5+ drinks at one occasion Weekly   Audit 2/3 Score 5   How  often not able to stop drinking once started Never   How often failed to do what normally expected Never   How often needed first drink in am after a heavy drinking session Never   How often feeling of guilt or remorse after drinking Never   How often unable to remember what happened the night before Never   Have you or someone else been injured because of your drinking No   Has anyone been concerned or suggested you cut down on drinking Yes, during the last year   TOTAL SCORE - AUDIT 12   Do you use any other substances recreationally? (!) ALCOHOL     Social History     Tobacco Use    Smoking status: Former     Current packs/day: 0.00     Average packs/day: 0.1 packs/day for 17.0 years (1.7 ttl pk-yrs)     Types: Cigarettes     Start date: 1996     Quit date: 2013     Years since quittin.4    Smokeless tobacco: Never   Vaping Use    Vaping status: Never Used   Substance Use Topics    Alcohol use: Yes     Alcohol/week: 40.0 standard drinks of alcohol     Types: 40 Standard drinks or equivalent per week     Comment: advised cutting down 7/3/17    Drug use: No             2024   One time HIV Screening   Previous HIV test? No         2024   STI Screening   New sexual partner(s) since last STI/HIV test? No   ASCVD Risk   The 10-year ASCVD risk score (Kaveh DK, et al., 2019) is: 1.8%    Values used to calculate the score:      Age: 43 years      Sex: Male      Is Non- : No      Diabetic: No      Tobacco smoker: No      Systolic Blood Pressure: 136 mmHg      Is BP treated: Yes      HDL Cholesterol: 31 mg/dL      Total Cholesterol: 146 mg/dL        2024   Contraception/Family Planning   Questions about contraception or family planning No        Reviewed and updated as needed this visit by Provider     Meds   Med Hx  Surg Hx  Fam Hx            Past Medical History:   Diagnosis Date    Fatty liver     CT scan  w/ hepatic steatosis    GERD (gastroesophageal  reflux disease)     Hypertension     AMADEO (obstructive sleep apnea)     Does not use anything, encouraged to restart cpap .    Reflux     Restless legs      Past Surgical History:   Procedure Laterality Date    CYST REMOVAL      SOFT TISSUE SURGERY      cyst removal     Lab work is in process  Labs reviewed in EPIC  BP Readings from Last 3 Encounters:   24 136/86   23 126/84   23 125/82    Wt Readings from Last 3 Encounters:   24 (!) 160.6 kg (354 lb)   23 (!) 161 kg (355 lb)   23 (!) 157.2 kg (346 lb 8 oz)                  Patient Active Problem List   Diagnosis    Benign essential hypertension    Hyperlipidemia LDL goal <130    Hepatic steatosis    History of alcohol dependence (H)    AMADEO (obstructive sleep apnea) Severe    Restless leg    Prediabetes    Microalbuminuria    Morbid obesity with BMI of 45.0-49.9, adult (H)     Past Surgical History:   Procedure Laterality Date    CYST REMOVAL      SOFT TISSUE SURGERY      cyst removal       Social History     Tobacco Use    Smoking status: Former     Current packs/day: 0.00     Average packs/day: 0.1 packs/day for 17.0 years (1.7 ttl pk-yrs)     Types: Cigarettes     Start date: 1996     Quit date: 2013     Years since quittin.4    Smokeless tobacco: Never   Substance Use Topics    Alcohol use: Yes     Alcohol/week: 40.0 standard drinks of alcohol     Types: 40 Standard drinks or equivalent per week     Comment: advised cutting down 7/3/17     Family History   Problem Relation Age of Onset    Diabetes Mother     Hypertension Mother     C.A.D. Father 70    Snoring Father     Cancer - colorectal Maternal Grandfather     Diabetes Paternal Grandmother     Cerebrovascular Disease Paternal Grandmother     Asthma No family hx of     Breast Cancer No family hx of     Prostate Cancer No family hx of     Thyroid Disease No family hx of     Lipids No family hx of          Current Outpatient Medications   Medication Sig  Dispense Refill    aspirin 81 MG tablet Take 1 tablet by mouth daily. 30 tablet 3    Blood Pressure Monitor KIT Use to check blood pressure daily 1 kit 0    lisinopril (ZESTRIL) 20 MG tablet Take 1 tablet (20 mg) by mouth daily 90 tablet 3     No Known Allergies  Recent Labs   Lab Test 06/14/24  0749 03/31/23  1411 11/14/22  1221 05/05/22  1402 03/17/21  0845 03/09/21  1849 09/15/20  0805 03/04/20  1709 08/20/19  0707 07/03/17  0702   A1C 5.5 5.4  --  5.7* 5.5  --  6.0*   < > 5.9*  --    LDL  --  73  --  90  --   --  81  --  90 121*   HDL  --  31*  --  38*  --   --  44  --  35* 41   TRIG  --  208*  --  113  --   --  117  --  98 116   ALT  --   --  55 43  --  53  --   --  54 60   CR  --   --  1.04 1.14 1.12 2.25* 1.27*  --  1.18 1.09   GFRESTIMATED  --   --  >90 84 83 36* 71  --  78 77   GFRESTBLACK  --   --   --   --  >90 41* 82  --  >90 >90  African American GFR Calc     POTASSIUM  --   --  4.1 3.9 4.2 4.0 4.8  --  4.3 4.1   TSH  --   --   --   --   --   --   --   --  2.27 2.70    < > = values in this interval not displayed.          Review of Systems  Constitutional, HEENT, cardiovascular, pulmonary, gi and gu systems are negative, except as otherwise noted.     Objective    Exam  /86   Pulse 89   Temp 97  F (36.1  C) (Tympanic)   Resp 16   Ht 1.829 m (6')   Wt (!) 160.6 kg (354 lb)   SpO2 97%   BMI 48.01 kg/m     Estimated body mass index is 48.01 kg/m  as calculated from the following:    Height as of this encounter: 1.829 m (6').    Weight as of this encounter: 160.6 kg (354 lb).    Physical Exam  Vitals and nursing note reviewed.   Constitutional:       General: He is not in acute distress.     Appearance: Normal appearance. He is not ill-appearing, toxic-appearing or diaphoretic.   HENT:      Head: Normocephalic and atraumatic.   Cardiovascular:      Rate and Rhythm: Normal rate and regular rhythm.      Heart sounds: No murmur heard.     No friction rub. No gallop.   Pulmonary:      Effort: No  respiratory distress.      Breath sounds: No stridor. No wheezing or rhonchi.   Skin:     Capillary Refill: Capillary refill takes less than 2 seconds.   Neurological:      General: No focal deficit present.      Mental Status: He is alert.               Signed Electronically by: Tasha Coles MD

## 2024-06-14 NOTE — PATIENT INSTRUCTIONS
"Patient Education   Preventive Care Advice   This is general advice we often give to help people stay healthy. Your care team may have specific advice just for you. Please talk to your care team about your own preventive care needs.  Lifestyle  Exercise at least 150 minutes each week (30 minutes a day, 5 days a week).  Do muscle strengthening activities 2 days a week. These help control your weight and prevent disease.  No smoking.  Wear sunscreen to prevent skin cancer.  Have your home tested for radon every 2 to 5 years. Radon is a colorless, odorless gas that can harm your lungs. To learn more, go to www.health.[facility].mn.us and search for \"Radon in Homes.\"  Keep guns unloaded and locked up in a safe place like a safe or gun vault, or, use a gun lock and hide the keys. Always lock away bullets separately. To learn more, visit [employer].[place].gov and search for \"safe gun storage.\"  Nutrition  Eat 5 or more servings of fruits and vegetables each day.  Try wheat bread, brown rice and whole grain pasta (instead of white bread, rice, and pasta).  Get enough calcium and vitamin D. Check the label on foods and aim for 100% of the RDA (recommended daily allowance).  Regular exams  Have a dental exam and cleaning every 6 months.  See your health care team every year to talk about:  Any changes in your health.  Any medicines your care team has prescribed.  Preventive care, family planning, and ways to prevent chronic diseases.  Shots (vaccines)   HPV shots (up to age 26), if you've never had them before.  Hepatitis B shots (up to age 59), if you've never had them before.  COVID-19 shot: Get this shot when it's due.  Flu shot: Get a flu shot every year.  Tetanus shot: Get a tetanus shot every 10 years.  Pneumococcal, hepatitis A, and RSV shots: Ask your care team if you need these based on your risk.  Shingles shot (for age 50 and up).  General health tests  Diabetes screening:  Starting at age 35, Get screened for diabetes at least " every 3 years.  If you are younger than age 35, ask your care team if you should be screened for diabetes.  Cholesterol test: At age 39, start having a cholesterol test every 5 years, or more often if advised.  Bone density scan (DEXA): At age 50, ask your care team if you should have this scan for osteoporosis (brittle bones).  Hepatitis C: Get tested at least once in your life.  Abdominal aortic aneurysm screening: Talk to your doctor about having this screening if you:  Have ever smoked; and  Are biologically male; and  Are between the ages of 65 and 75.  STIs (sexually transmitted infections)  Before age 24: Ask your care team if you should be screened for STIs.  After age 24: Get screened for STIs if you're at risk. You are at risk for STIs (including HIV) if:  You are sexually active with more than one person.  You don't use condoms every time.  You or a partner was diagnosed with a sexually transmitted infection.  If you are at risk for HIV, ask about PrEP medicine to prevent HIV.  Get tested for HIV at least once in your life, whether you are at risk for HIV or not.  Cancer screening tests  Cervical cancer screening: If you have a cervix, begin getting regular cervical cancer screening tests at age 21. Most people who have regular screenings with normal results can stop after age 65. Talk about this with your provider.  Breast cancer scan (mammogram): If you've ever had breasts, begin having regular mammograms starting at age 40. This is a scan to check for breast cancer.  Colon cancer screening: It is important to start screening for colon cancer at age 45.  Have a colonoscopy test every 10 years (or more often if you're at risk) Or, ask your provider about stool tests like a FIT test every year or Cologuard test every 3 years.  To learn more about your testing options, visit: www.[employer]/462843.pdf.  For help making a decision, visit: saba/yr95180.  Prostate cancer screening test: If you have a  prostate and are age 55 to 69, ask your provider if you would benefit from a yearly prostate cancer screening test.  Lung cancer screening: If you are a current or former smoker age 50 to 80, ask your care team if ongoing lung cancer screenings are right for you.  For informational purposes only. Not to replace the advice of your health care provider. Copyright   2023 [facility]. All rights reserved. Clinically reviewed by the [facility] Transitions Program. [employer] 320723 - REV 04/24.

## 2024-09-19 ENCOUNTER — OFFICE VISIT (OUTPATIENT)
Dept: FAMILY MEDICINE | Facility: CLINIC | Age: 44
End: 2024-09-19
Payer: COMMERCIAL

## 2024-09-19 VITALS
RESPIRATION RATE: 18 BRPM | DIASTOLIC BLOOD PRESSURE: 87 MMHG | HEIGHT: 73 IN | HEART RATE: 93 BPM | BODY MASS INDEX: 41.75 KG/M2 | TEMPERATURE: 97.3 F | WEIGHT: 315 LBS | SYSTOLIC BLOOD PRESSURE: 137 MMHG | OXYGEN SATURATION: 97 %

## 2024-09-19 DIAGNOSIS — I10 BENIGN ESSENTIAL HYPERTENSION: Primary | ICD-10-CM

## 2024-09-19 DIAGNOSIS — F10.21 HISTORY OF ALCOHOL DEPENDENCE (H): ICD-10-CM

## 2024-09-19 DIAGNOSIS — E66.01 MORBID OBESITY WITH BMI OF 45.0-49.9, ADULT (H): ICD-10-CM

## 2024-09-19 DIAGNOSIS — G47.33 OSA (OBSTRUCTIVE SLEEP APNEA): ICD-10-CM

## 2024-09-19 PROCEDURE — 99213 OFFICE O/P EST LOW 20 MIN: CPT | Mod: 25 | Performed by: PHYSICIAN ASSISTANT

## 2024-09-19 PROCEDURE — 90471 IMMUNIZATION ADMIN: CPT | Performed by: PHYSICIAN ASSISTANT

## 2024-09-19 PROCEDURE — 90656 IIV3 VACC NO PRSV 0.5 ML IM: CPT | Performed by: PHYSICIAN ASSISTANT

## 2024-09-19 ASSESSMENT — PAIN SCALES - GENERAL: PAINLEVEL: NO PAIN (0)

## 2024-09-19 NOTE — PROGRESS NOTES
Assessment & Plan       ICD-10-CM    1. Benign essential hypertension  I10       2. AMADEO (obstructive sleep apnea)  G47.33 Adult Sleep Eval & Management  Referral      3. Morbid obesity with BMI of 45.0-49.9, adult (H)  E66.01     Z68.42       4. History of alcohol dependence (H)  F10.21       Work on Healthy diet and exercise. Getting heart rate elevated for 30 mins most days of week.  medical conditions are stable. meds refilled.  Deferred GLP1 medication at this time.   Deferred treatment/counseling for ETOH use at this time.   Recheck yearly.   The longitudinal plan of care for the diagnosis(es)/condition(s) as documented were addressed during this visit. Due to the added complexity in care, I will continue to support Armando in the subsequent management and with ongoing continuity of care.  Guillermo Roberts is a 44 year old, presenting for the following health issues:  Recheck Medication      History of Present Illness       Hypertension: He presents for follow up of hypertension.  He does not check blood pressure  regularly outside of the clinic. Outpatient blood pressures have not been over 140/90. He follows a low salt diet.     He eats 2-3 servings of fruits and vegetables daily.He consumes 0 sweetened beverage(s) daily.He exercises with enough effort to increase his heart rate 10 to 19 minutes per day.  He exercises with enough effort to increase his heart rate 3 or less days per week.   He is taking medications regularly.       Hypertension Follow-up    Do you check your blood pressure regularly outside of the clinic? No   Are you following a low salt diet? No  Are your blood pressures ever more than 140 on the top number (systolic) OR more   than 90 on the bottom number (diastolic), for example 140/90? No  No chest pain or short of breath. No headache or dizziness.     History of ETOH abuse. State he has been cutting back on ETOH usage.     Was diagnosis with AMADEO in 2018 and didn't tolerate  "CPAP. Is interested in follow up with sleep clinic.     Review of Systems  Constitutional, HEENT, cardiovascular, pulmonary, gi and gu systems are negative, except as otherwise noted.      Objective    /87   Pulse 93   Temp 97.3  F (36.3  C) (Tympanic)   Resp 18   Ht 1.842 m (6' 0.5\")   Wt (!) 158.8 kg (350 lb)   SpO2 97%   BMI 46.82 kg/m    Body mass index is 46.82 kg/m .  Physical Exam   GENERAL: alert and no distress  EYES: Eyes grossly normal to inspection, PERRL and conjunctivae and sclerae normal  HENT: ear canals and TM's normal, nose and mouth without ulcers or lesions  NECK: no adenopathy, no asymmetry, masses, or scars  RESP: lungs clear to auscultation - no rales, rhonchi or wheezes  CV: regular rate and rhythm, normal S1 S2, no S3 or S4, no murmur, click or rub, no peripheral edema  ABDOMEN: soft, nontender, no hepatosplenomegaly, no masses and bowel sounds normal  MS: no gross musculoskeletal defects noted, mild bilateral  edema    Labs reviewed.         Signed Electronically by: José Kolb PA-C    "

## 2024-12-10 ENCOUNTER — VIRTUAL VISIT (OUTPATIENT)
Dept: SLEEP MEDICINE | Facility: CLINIC | Age: 44
End: 2024-12-10
Attending: PHYSICIAN ASSISTANT

## 2024-12-10 VITALS — HEIGHT: 72 IN | BODY MASS INDEX: 42.66 KG/M2 | WEIGHT: 315 LBS

## 2024-12-10 DIAGNOSIS — G47.33 OSA (OBSTRUCTIVE SLEEP APNEA): Primary | ICD-10-CM

## 2024-12-10 DIAGNOSIS — R09.81 NASAL CONGESTION: ICD-10-CM

## 2024-12-10 DIAGNOSIS — Z78.9 INTOLERANCE OF CONTINUOUS POSITIVE AIRWAY PRESSURE (CPAP) VENTILATION: ICD-10-CM

## 2024-12-10 DIAGNOSIS — E66.01 MORBID OBESITY WITH BMI OF 45.0-49.9, ADULT (H): ICD-10-CM

## 2024-12-10 PROCEDURE — 99204 OFFICE O/P NEW MOD 45 MIN: CPT | Mod: 95 | Performed by: INTERNAL MEDICINE

## 2024-12-10 RX ORDER — ZOLPIDEM TARTRATE 10 MG/1
TABLET ORAL
Qty: 1 TABLET | Refills: 0 | Status: SHIPPED | OUTPATIENT
Start: 2024-12-10

## 2024-12-10 ASSESSMENT — SLEEP AND FATIGUE QUESTIONNAIRES
HOW LIKELY ARE YOU TO NOD OFF OR FALL ASLEEP WHILE SITTING AND TALKING TO SOMEONE: WOULD NEVER DOZE
HOW LIKELY ARE YOU TO NOD OFF OR FALL ASLEEP WHILE SITTING QUIETLY AFTER LUNCH WITHOUT ALCOHOL: WOULD NEVER DOZE
HOW LIKELY ARE YOU TO NOD OFF OR FALL ASLEEP WHEN YOU ARE A PASSENGER IN A CAR FOR AN HOUR WITHOUT A BREAK: WOULD NEVER DOZE

## 2024-12-10 ASSESSMENT — PAIN SCALES - GENERAL: PAINLEVEL_OUTOF10: NO PAIN (0)

## 2024-12-10 NOTE — LETTER
12/10/2024      Armando Domingo  [address] [facility]  [place] [address]      Dear Colleague,    Thank you for referring your patient, Armando Domingo, to the [facility] [place]. Please see a copy of my visit note below.    Virtual Visit Details    Type of service:  Video Visit   Video Start Time: 1:08 PM  Video End Time:1:36 PM    Originating Location (pt. Location): Other car    Distant Location (provider location):  Off-site  Platform used for Video Visit: Elie    Chief complaint: Consultation requested by José Kolb PA-C for evaluation of options to treat sleep apnea    Last sleep medicine office visit 8/23/2018  Comprehensive sleep medicine questionnaire  incomplete    History of Present Illness: 44-year-old gentleman with history of hypertension, obesity, severe obstructive sleep apnea.  He is interested in seeing what options are to treat sleep apnea.  He tried treating it in 2018 for a while with auto titrating CPAP.  Had difficulty breathing with it on.  He would sometimes wake up with the machine off.  He ended up returning the machine.    He has cut back significantly on drinking alcohol in the last few months.  He does note that that seems to help improve his sleep quality little bit.  However he still feels tired during the day.  Although he does not rate himself excessively sleepy by [place].  He does not take intentional naps.  He drinks about 3 to 4 cups of coffee a day by noon.  He tries to go to bed around 10 PM with rise time around 6.  He is not taking any sleep aids.  His weight has not significantly changed from the time of his last sleep study.    He does suffer from significant amount of dry mouth at night.  He sleeps with water by the bed and feels like he has some nasal congestion.  He is never tried any nasal sprays.    He thinks he is sleep talks rarely.  No history of sleepwalking, nightmares or dream enactment behavior.  He denies symptoms of restless legs  at this time.    [place] Sleepiness Scale  ESS 6  (Less than 10 normal)    Insomnia Severity Scale  DESHAWN Total Score: (Patient-Rptd) 10  Total score categories:  0-7 = No clinically significant insomnia   8-14 = Subthreshold insomnia   15-21 = Clinical insomnia (moderate severity)  22-28 = Clinical insomnia (severe)      Past Medical History:   Diagnosis Date     Fatty liver     CT scan  w/ hepatic steatosis     GERD (gastroesophageal reflux disease)      Hypertension      AMADEO (obstructive sleep apnea)     Does not use anything, encouraged to restart cpap .     Reflux      Restless legs        No Known Allergies    Current Outpatient Medications   Medication Sig Dispense Refill     zolpidem (AMBIEN) 10 MG tablet Take tablet by mouth 15 minutes prior to sleep, for Sleep Study 1 tablet 0     aspirin 81 MG tablet Take 1 tablet by mouth daily. 30 tablet 3     Blood Pressure Monitor KIT Use to check blood pressure daily 1 kit 0     lisinopril (ZESTRIL) 20 MG tablet Take 1 tablet (20 mg) by mouth daily 90 tablet 3     No current facility-administered medications for this visit.       Social History     Socioeconomic History     Marital status:      Spouse name: Not on file     Number of children: Not on file     Years of education: Not on file     Highest education level: Not on file   Occupational History     Not on file   Tobacco Use     Smoking status: Former     Current packs/day: 0.00     Average packs/day: 0.1 packs/day for 17.0 years (1.7 ttl pk-yrs)     Types: Cigarettes     Start date: 1996     Quit date: 2013     Years since quittin.9     Passive exposure: Never     Smokeless tobacco: Never   Vaping Use     Vaping status: Never Used   Substance and Sexual Activity     Alcohol use: Yes     Alcohol/week: 40.0 standard drinks of alcohol     Types: 40 Standard drinks or equivalent per week     Comment: 12 salvador once a month 2024     Drug use: No     Sexual activity: Yes     Partners:  Female     Comment: none   Other Topics Concern     Parent/sibling w/ CABG, MI or angioplasty before 65F 55M? Not Asked   Social History Narrative     Not on file     Social Drivers of Health     Financial Resource Strain: Low Risk  (6/14/2024)    Financial Resource Strain      Within the past 12 months, have you or your family members you live with been unable to get utilities (heat, electricity) when it was really needed?: No   Food Insecurity: Low Risk  (6/14/2024)    Food Insecurity      Within the past 12 months, did you worry that your food would run out before you got money to buy more?: No      Within the past 12 months, did the food you bought just not last and you didn t have money to get more?: No   Transportation Needs: Low Risk  (6/14/2024)    Transportation Needs      Within the past 12 months, has lack of transportation kept you from medical appointments, getting your medicines, non-medical meetings or appointments, work, or from getting things that you need?: No   Physical Activity: Unknown (6/14/2024)    Exercise Vital Sign      Days of Exercise per Week: 2 days      Minutes of Exercise per Session: Not on file   Stress: Stress Concern Present (6/14/2024)    [place] [employer] - Occupational Stress Questionnaire      Feeling of Stress : To some extent   Social Connections: Unknown (6/14/2024)    Social Connection and Isolation Panel [NHANES]      Frequency of Communication with Friends and Family: Not on file      Frequency of Social Gatherings with Friends and Family: Twice a week      Attends Rastafari Services: Not on file      Active Member of Clubs or Organizations: Not on file      Attends Club or Organization Meetings: Not on file      Marital Status: Not on file   Interpersonal Safety: Low Risk  (6/14/2024)    Interpersonal Safety      Do you feel physically and emotionally safe where you currently live?: Yes      Within the past 12 months, have you been hit, slapped,  kicked or otherwise physically hurt by someone?: No      Within the past 12 months, have you been humiliated or emotionally abused in other ways by your partner or ex-partner?: No   Housing Stability: Low Risk  (6/14/2024)    Housing Stability      Do you have housing? : Yes      Are you worried about losing your housing?: No       Family History   Problem Relation Age of Onset     Diabetes Mother      Hypertension Mother      C.A.D. Father 70     Snoring Father      Cancer - colorectal Maternal Grandfather      Diabetes Paternal Grandmother      Cerebrovascular Disease Paternal Grandmother      Asthma No family hx of      Breast Cancer No family hx of      Prostate Cancer No family hx of      Thyroid Disease No family hx of      Lipids No family hx of          EXAM:  Ht 1.829 m (6')   Wt (!) 158.8 kg (350 lb)   BMI 47.47 kg/m    GENERAL: Alert and no distress  EYES: Eyes grossly normal to inspection.  No discharge or erythema, or obvious scleral/conjunctival abnormalities.  RESP: No audible wheeze, cough, or visible cyanosis.    SKIN: Visible skin clear. No significant rash, abnormal pigmentation or lesions.  NEURO: Cranial nerves grossly intact.  Mentation and speech appropriate for age.  PSYCH: Appropriate affect, tone, and pace of words       HSAT 6/20/2018  Weight 355 lbs, BMI 48.8  , Lowest O2 sat 59%   Time with saturation less than or equal to 88% was 270 minutes.     TSH   Date Value Ref Range Status   08/20/2019 2.27 0.40 - 4.00 mU/L Final     Lab Results   Component Value Date    A1C 5.5 06/14/2024    A1C 5.4 03/31/2023    A1C 5.7 05/05/2022    A1C 5.5 03/17/2021    A1C 6.0 09/15/2020    A1C 5.9 03/04/2020    A1C 5.9 08/20/2019         ASSESSMENT:  44-year-old gentleman with history of hypertension, obesity, very severe obstructive sleep apnea with associated hypoxemia.  His weight is unchanged from the time of his last sleep study.  I do not think that underlying diagnosis has changed.   Unfortunately, there are not a lot of alternatives to CPAP that would give as good of a potential treatment as CPAP.  He had a high pressure requirement.    PLAN:  Recommended in-lab titration study to optimize CPAP or bilevel settings for effectiveness as well as comfort.  Patient is agreeable to this.  Also provided him a prescription for 1 tablet of a sleep aid to bring to the lab and take at lights out.  I will be putting orders for CPAP or bilevel after I have reviewed the study.  He should be enrolled in the sleep therapy management program and have follow-up with me 7 to 9 weeks after starting PAP therapy again.  In the meantime recommended a trial of anti-inflammatory nasal spray 2 sprays each nostril nightly.  See instructions for further details of counseling provided.  Strongly urged him to remain abstinent from alcohol use and continue efforts at weight management.    45 minutes spent by me on the date of the encounter doing chart review, history and exam, documentation and further activities per the note    Benita Nicholson M.D.  Pulmonary/Critical Care/Sleep Medicine    [facility]   Floor 1, [address]   [address] [place] [address]. [place] [address]   Appointments: [phone]    The above note was dictated using voice recognition software and may include typographical errors. Please contact the author for any clarifications.                  Again, thank you for allowing me to participate in the care of your patient.        Sincerely,        Benita Nicholson MD

## 2024-12-10 NOTE — PROGRESS NOTES
Virtual Visit Details    Type of service:  Video Visit   Video Start Time: 1:08 PM  Video End Time:1:36 PM    Originating Location (pt. Location): Other car    Distant Location (provider location):  Off-site  Platform used for Video Visit: Elie    Chief complaint: Consultation requested by José Kolb PA-C for evaluation of options to treat sleep apnea    Last sleep medicine office visit 8/23/2018  Comprehensive sleep medicine questionnaire  incomplete    History of Present Illness: 44-year-old gentleman with history of hypertension, obesity, severe obstructive sleep apnea.  He is interested in seeing what options are to treat sleep apnea.  He tried treating it in 2018 for a while with auto titrating CPAP.  Had difficulty breathing with it on.  He would sometimes wake up with the machine off.  He ended up returning the machine.    He has cut back significantly on drinking alcohol in the last few months.  He does note that that seems to help improve his sleep quality little bit.  However he still feels tired during the day.  Although he does not rate himself excessively sleepy by [place].  He does not take intentional naps.  He drinks about 3 to 4 cups of coffee a day by noon.  He tries to go to bed around 10 PM with rise time around 6.  He is not taking any sleep aids.  His weight has not significantly changed from the time of his last sleep study.    He does suffer from significant amount of dry mouth at night.  He sleeps with water by the bed and feels like he has some nasal congestion.  He is never tried any nasal sprays.    He thinks he is sleep talks rarely.  No history of sleepwalking, nightmares or dream enactment behavior.  He denies symptoms of restless legs at this time.    [place] Sleepiness Scale  ESS 6  (Less than 10 normal)    Insomnia Severity Scale  DESHAWN Total Score: (Patient-Rptd) 10  Total score categories:  0-7 = No clinically significant insomnia   8-14 = Subthreshold insomnia   15-21 =  Clinical insomnia (moderate severity)  22-28 = Clinical insomnia (severe)      Past Medical History:   Diagnosis Date    Fatty liver     CT scan  w/ hepatic steatosis    GERD (gastroesophageal reflux disease)     Hypertension     AMADEO (obstructive sleep apnea)     Does not use anything, encouraged to restart cpap .    Reflux     Restless legs        No Known Allergies    Current Outpatient Medications   Medication Sig Dispense Refill    zolpidem (AMBIEN) 10 MG tablet Take tablet by mouth 15 minutes prior to sleep, for Sleep Study 1 tablet 0    aspirin 81 MG tablet Take 1 tablet by mouth daily. 30 tablet 3    Blood Pressure Monitor KIT Use to check blood pressure daily 1 kit 0    lisinopril (ZESTRIL) 20 MG tablet Take 1 tablet (20 mg) by mouth daily 90 tablet 3     No current facility-administered medications for this visit.       Social History     Socioeconomic History    Marital status:      Spouse name: Not on file    Number of children: Not on file    Years of education: Not on file    Highest education level: Not on file   Occupational History    Not on file   Tobacco Use    Smoking status: Former     Current packs/day: 0.00     Average packs/day: 0.1 packs/day for 17.0 years (1.7 ttl pk-yrs)     Types: Cigarettes     Start date: 1996     Quit date: 2013     Years since quittin.9     Passive exposure: Never    Smokeless tobacco: Never   Vaping Use    Vaping status: Never Used   Substance and Sexual Activity    Alcohol use: Yes     Alcohol/week: 40.0 standard drinks of alcohol     Types: 40 Standard drinks or equivalent per week     Comment: 12 salvador once a month 2024    Drug use: No    Sexual activity: Yes     Partners: Female     Comment: none   Other Topics Concern    Parent/sibling w/ CABG, MI or angioplasty before 65F 55M? Not Asked   Social History Narrative    Not on file     Social Drivers of Health     Financial Resource Strain: Low Risk  (2024)    Financial Resource  Strain     Within the past 12 months, have you or your family members you live with been unable to get utilities (heat, electricity) when it was really needed?: No   Food Insecurity: Low Risk  (6/14/2024)    Food Insecurity     Within the past 12 months, did you worry that your food would run out before you got money to buy more?: No     Within the past 12 months, did the food you bought just not last and you didn t have money to get more?: No   Transportation Needs: Low Risk  (6/14/2024)    Transportation Needs     Within the past 12 months, has lack of transportation kept you from medical appointments, getting your medicines, non-medical meetings or appointments, work, or from getting things that you need?: No   Physical Activity: Unknown (6/14/2024)    Exercise Vital Sign     Days of Exercise per Week: 2 days     Minutes of Exercise per Session: Not on file   Stress: Stress Concern Present (6/14/2024)    [place] [employer] - Occupational Stress Questionnaire     Feeling of Stress : To some extent   Social Connections: Unknown (6/14/2024)    Social Connection and Isolation Panel [NHANES]     Frequency of Communication with Friends and Family: Not on file     Frequency of Social Gatherings with Friends and Family: Twice a week     Attends Jain Services: Not on file     Active Member of Clubs or Organizations: Not on file     Attends Club or Organization Meetings: Not on file     Marital Status: Not on file   Interpersonal Safety: Low Risk  (6/14/2024)    Interpersonal Safety     Do you feel physically and emotionally safe where you currently live?: Yes     Within the past 12 months, have you been hit, slapped, kicked or otherwise physically hurt by someone?: No     Within the past 12 months, have you been humiliated or emotionally abused in other ways by your partner or ex-partner?: No   Housing Stability: Low Risk  (6/14/2024)    Housing Stability     Do you have housing? : Yes     Are  you worried about losing your housing?: No       Family History   Problem Relation Age of Onset    Diabetes Mother     Hypertension Mother     C.A.D. Father 70    Snoring Father     Cancer - colorectal Maternal Grandfather     Diabetes Paternal Grandmother     Cerebrovascular Disease Paternal Grandmother     Asthma No family hx of     Breast Cancer No family hx of     Prostate Cancer No family hx of     Thyroid Disease No family hx of     Lipids No family hx of          EXAM:  Ht 1.829 m (6')   Wt (!) 158.8 kg (350 lb)   BMI 47.47 kg/m    GENERAL: Alert and no distress  EYES: Eyes grossly normal to inspection.  No discharge or erythema, or obvious scleral/conjunctival abnormalities.  RESP: No audible wheeze, cough, or visible cyanosis.    SKIN: Visible skin clear. No significant rash, abnormal pigmentation or lesions.  NEURO: Cranial nerves grossly intact.  Mentation and speech appropriate for age.  PSYCH: Appropriate affect, tone, and pace of words       HSAT 6/20/2018  Weight 355 lbs, BMI 48.8  , Lowest O2 sat 59%   Time with saturation less than or equal to 88% was 270 minutes.     TSH   Date Value Ref Range Status   08/20/2019 2.27 0.40 - 4.00 mU/L Final     Lab Results   Component Value Date    A1C 5.5 06/14/2024    A1C 5.4 03/31/2023    A1C 5.7 05/05/2022    A1C 5.5 03/17/2021    A1C 6.0 09/15/2020    A1C 5.9 03/04/2020    A1C 5.9 08/20/2019         ASSESSMENT:  44-year-old gentleman with history of hypertension, obesity, very severe obstructive sleep apnea with associated hypoxemia.  His weight is unchanged from the time of his last sleep study.  I do not think that underlying diagnosis has changed.  Unfortunately, there are not a lot of alternatives to CPAP that would give as good of a potential treatment as CPAP.  He had a high pressure requirement.    PLAN:  Recommended in-lab titration study to optimize CPAP or bilevel settings for effectiveness as well as comfort.  Patient is agreeable to this.   Also provided him a prescription for 1 tablet of a sleep aid to bring to the lab and take at lights out.  I will be putting orders for CPAP or bilevel after I have reviewed the study.  He should be enrolled in the sleep therapy management program and have follow-up with me 7 to 9 weeks after starting PAP therapy again.  In the meantime recommended a trial of anti-inflammatory nasal spray 2 sprays each nostril nightly.  See instructions for further details of counseling provided.  Strongly urged him to remain abstinent from alcohol use and continue efforts at weight management.    45 minutes spent by me on the date of the encounter doing chart review, history and exam, documentation and further activities per the note    Benita Nicholson M.D.  Pulmonary/Critical Care/Sleep Medicine    [facility]   Floor 1, [address]   [address] [place], [place] [address]. [place] [address]   Appointments: [phone]    The above note was dictated using voice recognition software and may include typographical errors. Please contact the author for any clarifications.

## 2024-12-10 NOTE — NURSING NOTE
Current patient location: Patient declined to provide     Is the patient currently in the state of [place]? YES    Visit mode:VIDEO    If the visit is dropped, the patient can be reconnected by:VIDEO VISIT: Text to cell phone:   Telephone Information:   Mobile [phone]       Will anyone else be joining the visit? NO  (If patient encounters technical issues they should call [phone]    Are changes needed to the allergy or medication list? No    Are refills needed on medications prescribed by this physician? NO    Rooming Documentation:  Questionnaire(s) completed    Reason for visit: Consult    Maritza QUINN

## 2024-12-10 NOTE — Clinical Note
Patient has a history of very severe sleep apnea and tried CPAP for a while in 2018.  Ended up having to return the machine.  I ordered a CPAP titration to requalify him and to see if we can figure out settings that would be more easily tolerated.  Is that enough to get him a another CPAP machine or does he need a diagnostic first? Thanks

## 2024-12-10 NOTE — PATIENT INSTRUCTIONS
"Fluticasone, triamcinolone, mometasone nasal spray 2 sprays each nostril nightly for the next few months to see if it helps you breathe better through her nose and tolerate CPAP again better    I sent a prescription for 1 tablet of a sleep aid to your pharmacy.  Please bring that prescription with you to the sleep lab on the night of your study.  Do not take it until lights out.          MY TREATMENT INFORMATION FOR SLEEP APNEA-  Armando Domingo    DOCTOR : Benita Nicholson MD    Am I having a sleep study at a sleep center?  --->Due to normal delays, you will be contacted within 2-4 weeks to schedule        Frequently asked questions:  1. What is Obstructive Sleep Apnea (AMADEO)? AMADEO is the most common type of sleep apnea. Apnea means, \"without breath.\"  Apnea is most often caused by narrowing or collapse of the upper airway as muscles relax during sleep.   Almost everyone has occasional apneas. Most people with sleep apnea have had brief interruptions at night frequently for many years.  The severity of sleep apnea is related to how frequent and severe the events are.   2. What are the consequences of AMADEO? Symptoms include: feeling sleepy during the day, snoring loudly, gasping or stopping of breathing, trouble sleeping, and occasionally morning headaches or heartburn at night.  Sleepiness can be serious and even increase the risk of falling asleep while driving. Other health consequences may include development of high blood pressure and other cardiovascular disease in persons who are susceptible. Untreated AMADEO  can contribute to heart disease, stroke and diabetes.   3. What are the treatment options? In most situations, sleep apnea is a lifelong disease that must be managed with daily therapy. Medications are not effective for sleep apnea and surgery is generally not considered until other therapies have been tried. Your treatment is your choice . Continuous Positive Airway (CPAP) works right away and is the " therapy that is effective in nearly everyone. An oral device to hold your jaw forward is usually the next most reliable option. Other options include postioning devices (to keep you off your back), weight loss, and surgery including a tongue pacing device. There is more detail about some of these options below.  4. Are my sleep studies covered by insurance? Although we will request verification of coverage, we advise you also check in advance of the study to ensure there is coverage.    Important tips for those choosing CPAP and similar devices  REMEMBER-IF YOU RECEIVE A CALL FROM  [phone]-->IT IS TO SETUP A DEVICE  For new devices, sign up for device TAYLOR to monitor your device for your followup visits  We encourage you to utilize the [employer] taylor or website ( https://Prox[employer].Moneylib/ ) to monitor your therapy progress and share the data with your healthcare team when you discuss your sleep apnea.                                                    Know your equipment:  CPAP is continuous positive airway pressure that prevents obstructive sleep apnea by keeping the throat from collapsing while you are sleeping. In most cases, the device is  smart  and can slowly self-adjusts if your throat collapses and keeps a record every day of how well you are treated-this information is available to you and your care team.  BPAP is bilevel positive airway pressure that keeps your throat open and also assists each breath with a pressure boost to maintain adequate breathing.  Special kinds of BPAP are used in patients who have inadequate breathing from lung or heart disease. In most cases, the device is  smart  and can slowly self-adjusts to assist breathing. Like CPAP, the device keeps a record of how well you are treated.  Your mask is your connection to the device. You get to choose what feels most comfortable and the staff will help to make sure if fits. Here: are some examples of the different masks that are  available: Magnetic mask aids may assist with use but there are safety issues that should be addressed when considering with magnets* ( see end of discussion).       Key points to remember on your journey with sleep apnea:  Sleep study.  PAP devices often need to be adjusted during a sleep study to show that they are effective and adjusted right.  Good tips to remember: Try wearing just the mask during a quiet time during the day so your body adapts to wearing it. A humidifier is recommended for comfort in most cases to prevent drying of your nose and throat. Allergy medication from your provider may help you if you are having nasal congestion.  Getting settled-in. It takes more than one night for most of us to get used to wearing a mask. Try wearing just the mask during a quiet time during the day so your body adapts to wearing it. A humidifier is recommended for comfort in most cases. Our team will work with you carefully on the first day and will be in contact within 4 days and again at 2 and 4 weeks for advice and remote device adjustments. Your therapy is evaluated by the device each day.   Use it every night. The more you are able to sleep naturally for 7-8 hours, the more likely you will have good sleep and to prevent health risks or symptoms from sleep apnea. Even if you use it 4 hours it helps. Occasionally all of us are unable to use a medical therapy, in sleep apnea, it is not dangerous to miss one night.   Communicate. Call our skilled team on the number provided on the first day if your visit for problems that make it difficult to wear the device. Over 2 out of 3 patients can learn to wear the device long-term with help from our team. Remember to call our team or your sleep providers if you are unable to wear the device as we may have other solutions for those who cannot adapt to mask CPAP therapy. It is recommended that you sleep your sleep provider within the first 3 months and yearly after that if  you are not having problems.   Use it for your health. We encourage use of CPAP masks during daytime quiet periods to allow your face and brain to adapt to the sensation of CPAP so that it will be a more natural sensation to awaken to at night or during naps. This can be very useful during the first few weeks or months of adapting to CPAP though it does not help medically to wear CPAP during wakefulness and  should not be used as a strategy just to meet guidelines.  Take care of your equipment. Make sure you clean your mask and tubing using directions every day and that your filter and mask are replaced as recommended or if they are not working.     *Masks with magnets:  Updated Contraindications  Masks with magnetic components are contraindicated for use by patients where they, or anyone in close physical contact while using the mask, have the following:   Active medical implants that interact with magnets (i.e., pacemakers, implantable cardioverter defibrillators (ICD), neurostimulators, cerebrospinal fluid (CSF) shunts, insulin/infusion pumps)   Metallic implants/objects containing ferromagnetic material (i.e., aneurysm clips/flow disruption devices, embolic coils, stents, valves, electrodes, implants to restore hearing or balance with implanted magnets, ocular implants, metallic splinters in the eye)  Updated Warning  Keep the mask magnets at a safe distance of at least 6 inches (150 mm) away from implants or medical devices that may be adversely affected by magnetic interference. This warning applies to you or anyone in close physical contact with your mask. The magnets are in the frame and lower headgear clips, with a magnetic field strength of up to 400mT. When worn, they connect to secure the mask but may inadvertently detach while asleep.  Implants/medical devices, including those listed within contraindications, may be adversely affected if they change function under external magnetic fields or contain  ferromagnetic materials that attract/repel to magnetic fields (some metallic implants, e.g., contact lenses with metal, dental implants, metallic cranial plates, screws, [place] hole covers, and bone substitute devices). Consult your physician and  of your implant / other medical device for information on the potential adverse effects of magnetic fields.    BESIDES CPAP, WHAT OTHER THERAPIES ARE THERE?    Positioning Device  Positioning devices are generally used when sleep apnea is mild and only occurs on your back.This example shows a pillow that straps around the waist. It may be appropriate for those whose sleep study shows milder sleep apnea that occurs primarily when lying flat on one's back. Preliminary studies have shown benefit but effectiveness at home may need to be verified by a home sleep test. These devices are generally not covered by medical insurance.  Examples of devices that maintain sleeping on the back to prevent snoring and mild sleep apnea.    Belt type body positioner  http://[employer]/    Electronic reminder  http://nightshifttherapy.com/            Oral Appliance  What is oral appliance therapy?  An oral appliance device fits on your teeth at night like a retainer used after having braces. The device is made by a specialized dentist and requires several visits over 1-2 months before a manufactured device is made to fit your teeth and is adjusted to prevent your sleep apnea. Once an oral device is working properly, snoring should be improved. A home sleep test may be recommended at that time if to determine whether the sleep apnea is adequately treated.       Some things to remember:  -Oral devices are often, but not always, covered by your medical insurance. Be sure to check with your insurance provider.   -If you are referred for oral therapy, you will be given a list of specialized dentists to consider or you may choose to visit the Web site of the American Academy of Dental  Sleep Medicine  -Oral devices are less likely to work if you have severe sleep apnea or are extremely overweight.     More detailed information  An oral appliance is a small acrylic device that fits over the upper and lower teeth  (similar to a retainer or a mouth guard). This device slightly moves jaw forward, which moves the base of the tongue forward, opens the airway, improves breathing for effective treat snoring and obstructive sleep apnea in perhaps 7 out of 10 people .  The best working devices are custom-made by a dental device  after a mold is made of the teeth 1, 2, 3.  When is an oral appliance indicated?  Oral appliance therapy is recommended as a first-line treatment for patients with primary snoring, mild sleep apnea, and for patients with moderate sleep apnea who prefer appliance therapy to use of CPAP4, 5. Severity of sleep apnea is determined by sleep testing and is based on the number of respiratory events per hour of sleep.   How successful is oral appliance therapy?  The success rate of oral appliance therapy in patients with mild sleep apnea is 75-80% while in patients with moderate sleep apnea it is 50-70%. The chance of success in patients with severe sleep apnea is 40-50%. The research also shows that oral appliances have a beneficial effect on the cardiovascular health of AMADEO patients at the same magnitude as CPAP therapy7.  Oral appliances should be a second-line treatment in cases of severe sleep apnea, but if not completely successful then a combination therapy utilizing CPAP plus oral appliance therapy may be effective. Oral appliances tend to be effective in a broad range of patients although studies show that the patients who have the highest success are females, younger patients, those with milder disease, and less severe obesity. 3, 6.   Finding a dentist that practices dental sleep medicine  Specific training is available through the American Academy of Dental Sleep  Medicine for dentists interested in working in the field of sleep. To find a dentist who is educated in the field of sleep and the use of oral appliances, near you, visit the Web site of the American Academy of Dental Sleep Medicine.    References  1. Radha et al. Objectively measured vs self-reported compliance during oral appliance therapy for sleep-disordered breathing. Chest 2013; 144(5): 8891-2417.  2. Flavio et al. Objective measurement of compliance during oral appliance therapy for sleep-disordered breathing. Thorax 2013; 68(1): 91-96.  3. Rigoberto et al. Mandibular advancement devices in 620 men and women with AMADEO and snoring: tolerability and predictors of treatment success. Chest 2004; 125: 6873-1478.  4. Edward et al. Oral appliances for snoring and AMADEO: a review. Sleep 2006; 29: 244-262.  5. Lucien et al. Oral appliance treatment for AMADEO: an update. J Clin Sleep Med 2014; 10(2): 215-227.  6. Marcie et al. Predictors of OSAH treatment outcome. J Dent Res 2007; 86: 0189-3931.      Weight Loss:   Your Body mass index is 47.47 kg/m .    Being overweight does not necessarily mean you will have health consequences.  Those who have BMI over 35 or over 27 with existing medical conditions carries greater risk.   Weight loss decreases severity of sleep apnea in most people with obesity. For those with mild obesity who have developed snoring with weight gain, even 15-30 pound weight loss can improve and occasionally milder eliminate sleep apnea.  Structured and life-long dietary and health habits are necessary to lose weight and keep healthier weight levels.     The Comprehensive Weight loss program offers all aspects of weight loss strategies including two Non-Surgical Weight Loss Programs: Medical Weight Management and our 24 Week Healthy Lifestyle Program:    Medical Weight Management: You will meet with a Medical Weight Management Provider, as well as a Registered Dietician. The program  may include medication therapy, dietary education, recommended exercise and physical therapy programs, monthly support group meetings, and possible psychological counseling. Follow up visits with the provider or dietician are scheduled based on your progress and needs.    24 Week Healthy Lifestyle Program: This unique program is designed to give you the support of weekly appointments and activities thru a 24-week period. It may include all of the components of the basic program (above), with the addition of 11 individual Health  Visits, 24-week access to the [employer] website for over 700 online classes, and monthly support group meetings. This program has an out-of-pocket expense of $499 to cover the items that can not be billed to insurance (health coaches and [employer] access), and is non-refundable/non-transferable (you may be able to use a Health Savings Account; ask your HSA provider). There may be an optional meal replacement plan prescribed as well.   Surgical management achieves meaningful long-term weight loss and improvement in health risks in most patients with more severe obesity.      Sleep Apnea Surgery:    Surgery for obstructive sleep apnea is considered generally only when other therapies fail to work. Surgery may be discussed with you if you are having a difficult time tolerating CPAP and or when there is an abnormal structure that requires surgical correction.  Nose and throat surgeries often enlarge the airway to prevent collapse.  Most of these surgeries create pain for 1-2 weeks and up to half of the most common surgeries are not effective throughout life.  You should carefully discuss the benefits and drawbacks to surgery with your sleep provider and surgeon to determine if it is the best solution for you.   More information  Surgery for AMADEO is directed at areas that are responsible for narrowing or complete obstruction of the airway during sleep.  There are a wide range of procedures  available to enlarge and/or stabilize the airway to prevent blockage of breathing in the three major areas where it can occur: the palate, tongue, and nasal regions.  Successful surgical treatment depends on the accurate identification of the factors responsible for obstructive sleep apnea in each person.  A personalized approach is required because there is no single treatment that works well for everyone.  Because of anatomic variation, consultation with an examination by a sleep surgeon is a critical first step in determining what surgical options are best for each patient.  In some cases, examination during sedation may be recommended in order to guide the selection of procedures.  Patients will be counseled about risks and benefits as well as the typical recovery course after surgery. Surgery is typically not a cure for a person s AMADEO.  However, surgery will often significantly improve one s AMADEO severity (termed  success rate ).  Even in the absence of a cure, surgery will decrease the cardiovascular risk associated with OSA7; improve overall quality of life8 (sleepiness, functionality, sleep quality, etc).      Palate Procedures:  Patients with AMADEO often have narrowing of their airway in the region of their tonsils and uvula.  The goals of palate procedures are to widen the airway in this region as well as to help the tissues resist collapse.  Modern palate procedure techniques focus on tissue conservation and soft tissue rearrangement, rather than tissue removal.  Often the uvula is preserved in this procedure. Residual sleep apnea is common in patient after pharyngoplasty with an average reduction in sleep apnea events of 33%2.      Tongue Procedures:  ExamWhile patients are awake, the muscles that surround the throat are active and keep this region open for breathing. These muscles relax during sleep, allowing the tongue and other structures to collapse and block breathing.  There are several different  tongue procedures available.  Selection of a tongue base procedure depends on characteristics seen on physical exam.  Generally, procedures are aimed at removing bulky tissues in this area or preventing the back of the tongue from falling back during sleep.  Success rates for tongue surgery range from 50-62%3.    Hypoglossal Nerve Stimulation:  Hypoglossal nerve stimulation has recently received approval from the [place] Food and Drug Administration for the treatment of obstructive sleep apnea.  This is based on research showing that the system was safe and effective in treating sleep apnea6.  Results showed that the median AHI score decreased 68%, from 29.3 to 9.0. This therapy uses an implant system that senses breathing patterns and delivers mild stimulation to airway muscles, which keeps the airway open during sleep.  The system consists of three fully implanted components: a small generator (similar in size to a pacemaker), a breathing sensor, and a stimulation lead.  Using a small handheld remote, a patient turns the therapy on before bed and off upon awakening.    Candidates for this device must be greater than 18 years of age, have moderate to severe obstructive sleep apnea with less than 25% central events  (AHI between 15-65), BMI less than 35, have tried CPAP/oral appliance for at least 8 weeks without success, and have appropriate upper airway anatomy (determined by a sleep endoscopy performed by Dr. Brett Hare or Dr. Juan David Lugo).    Nasal Procedures:  Nasal obstruction can interfere with nasal breathing during the day and night.  Studies have shown that relief of nasal obstruction can improve the ability of some patients to tolerate positive airway pressure therapy for obstructive sleep apnea1.  Treatment options include medications such as nasal saline, topical corticosteroid and antihistamine sprays, and oral medications such as antihistamines or decongestants. Non-surgical treatments can  include external nasal dilators for selected patients. If these are not successful by themselves, surgery can improve the nasal airway either alone or in combination with these other options.        Combination Procedures:  Combination of surgical procedures and other treatments may be recommended, particularly if patients have more than one area of narrowing or persistent positional disease.  The success rate of combination surgery ranges from 66-80%2,3.    References  Yrn DAVIS. The Role of the Nose in Snoring and Obstructive Sleep Apnoea: An Update.  Eur Arch Otorhinolaryngol. 2011; 268: 1365-73.   Mercedes SM; Fidel JA; India JR; Pallanch JF; Nitin MB; Jacey SG; Chuck ANGEL. Surgical modifications of the upper airway for obstructive sleep apnea in adults: a systematic review and meta-analysis. SLEEP 2010;33(10):0838-3730. Hiren PUGH. Hypopharyngeal surgery in obstructive sleep apnea: an evidence-based medicine review.  Arch Otolaryngol Head Neck Surg. 2006 Feb;132(2):206-13.  El YH1, Hannah Y, Curtis MAVIS. The efficacy of anatomically based multilevel surgery for obstructive sleep apnea. Otolaryngol Head Neck Surg. 2003 Oct;129(4):327-35.  Hiren PUGH, Goldberg A. Hypopharyngeal Surgery in Obstructive Sleep Apnea: An Evidence-Based Medicine Review. Arch Otolaryngol Head Neck Surg. 2006 Feb;132(2):206-13.  Strollo PJ et al. Upper-Airway Stimulation for Obstructive Sleep Apnea.  N Engl J Med. 2014 Jan 9;370(2):139-49.  Kitty Y et al. Increased Incidence of Cardiovascular Disease in Middle-aged Men with Obstructive Sleep Apnea. Am J Respir Crit Care Med; 2002 166: 159-165  Zamorano EM et al. Studying Life Effects and Effectiveness of Palatopharyngoplasty (SLEEP) study: Subjective Outcomes of Isolated Uvulopalatopharyngoplasty. Otolaryngol Head Neck Surg. 2011; 144: 623-631.        WHAT IF I ONLY HAVE SNORING?    Mandibular advancement devices, lateral sleep positioning, long-term weight loss and treatment of nasal  allergies have been shown to improve snoring.  Exercising tongue muscles with a game (https://apps.[employer].Munchkin Fun/us/taylor/soundly-reduce-snoring/kd6712536561) or stimulating the tongue during the day with a device (https://doi.org/10.3390/ezs62085854) have improved snoring in some individuals.  https://www.[employer]unchkin Fun/  https://www.sleepfoundation.org/best-anti-snoring-mouthpieces-and-mouthguards    Remember to Drive Safe... Drive Alive     Sleep health profoundly affects your health, mood, and your safety.  Thirty three percent of the population (one in three of us) is not getting enough sleep and many have a sleep disorder. Not getting enough sleep or having an untreated / undertreated sleep condition may make us sleepy without even knowing it. In fact, our driving could be dramatically impaired due to our sleep health. As your provider, here are some things I would like you to know about driving:     Here are some warning signs for impairment and dangerous drowsy driving:              -Having been awake more than 16 hours               -Looking tired               -Eyelid drooping              -Head nodding (it could be too late at this point)              -Driving for more than 30 minutes     Some things you could do to make the driving safer if you are experiencing some drowsiness:              -Stop driving and rest              -Call for transportation              -Make sure your sleep disorder is adequately treated     Some things that have been shown NOT to work when experiencing drowsiness while driving:              -Turning on the radio              -Opening windows              -Eating any  distracting  /  entertaining  foods (e.g., sunflower seeds, candy, or any other)              -Talking on the phone      Your decision may not only impact your life, but also the life of others. Please, remember to drive safe for yourself and all of us.

## 2025-01-24 NOTE — PROGRESS NOTES
Patient informed BP controlled, no change in BP meds per .    Krystal Willis RN,   [facility]       unable to assess

## 2025-05-15 ENCOUNTER — PATIENT OUTREACH (OUTPATIENT)
Dept: CARE COORDINATION | Facility: CLINIC | Age: 45
End: 2025-05-15
Payer: COMMERCIAL

## 2025-06-30 ENCOUNTER — LAB (OUTPATIENT)
Dept: LAB | Facility: CLINIC | Age: 45
End: 2025-06-30
Payer: COMMERCIAL

## 2025-06-30 DIAGNOSIS — Z13.1 SCREENING FOR DIABETES MELLITUS: ICD-10-CM

## 2025-06-30 DIAGNOSIS — Z13.220 LIPID SCREENING: ICD-10-CM

## 2025-06-30 DIAGNOSIS — I10 BENIGN ESSENTIAL HYPERTENSION: ICD-10-CM

## 2025-06-30 LAB
ALBUMIN SERPL BCG-MCNC: 4.2 G/DL (ref 3.5–5.2)
ALP SERPL-CCNC: 55 U/L (ref 40–150)
ALT SERPL W P-5'-P-CCNC: 28 U/L (ref 0–70)
ANION GAP SERPL CALCULATED.3IONS-SCNC: 12 MMOL/L (ref 7–15)
AST SERPL W P-5'-P-CCNC: 36 U/L (ref 0–45)
BILIRUB SERPL-MCNC: 0.6 MG/DL
BUN SERPL-MCNC: 14.2 MG/DL (ref 6–20)
CALCIUM SERPL-MCNC: 9.3 MG/DL (ref 8.8–10.4)
CHLORIDE SERPL-SCNC: 104 MMOL/L (ref 98–107)
CHOLEST SERPL-MCNC: 149 MG/DL
CREAT SERPL-MCNC: 1.07 MG/DL (ref 0.67–1.17)
EGFRCR SERPLBLD CKD-EPI 2021: 89 ML/MIN/1.73M2
EST. AVERAGE GLUCOSE BLD GHB EST-MCNC: 117 MG/DL
FASTING STATUS PATIENT QL REPORTED: YES
FASTING STATUS PATIENT QL REPORTED: YES
GLUCOSE SERPL-MCNC: 112 MG/DL (ref 70–99)
HBA1C MFR BLD: 5.7 % (ref 0–5.6)
HCO3 SERPL-SCNC: 22 MMOL/L (ref 22–29)
HDLC SERPL-MCNC: 30 MG/DL
LDLC SERPL CALC-MCNC: 105 MG/DL
NONHDLC SERPL-MCNC: 119 MG/DL
POTASSIUM SERPL-SCNC: 4.6 MMOL/L (ref 3.4–5.3)
PROT SERPL-MCNC: 7.5 G/DL (ref 6.4–8.3)
SODIUM SERPL-SCNC: 138 MMOL/L (ref 135–145)
TRIGL SERPL-MCNC: 69 MG/DL

## 2025-06-30 PROCEDURE — 80053 COMPREHEN METABOLIC PANEL: CPT

## 2025-06-30 PROCEDURE — 36415 COLL VENOUS BLD VENIPUNCTURE: CPT

## 2025-06-30 PROCEDURE — 83036 HEMOGLOBIN GLYCOSYLATED A1C: CPT

## 2025-06-30 PROCEDURE — 80061 LIPID PANEL: CPT

## 2025-06-30 SDOH — HEALTH STABILITY: PHYSICAL HEALTH: ON AVERAGE, HOW MANY DAYS PER WEEK DO YOU ENGAGE IN MODERATE TO STRENUOUS EXERCISE (LIKE A BRISK WALK)?: 2 DAYS

## 2025-06-30 SDOH — HEALTH STABILITY: PHYSICAL HEALTH: ON AVERAGE, HOW MANY MINUTES DO YOU ENGAGE IN EXERCISE AT THIS LEVEL?: 20 MIN

## 2025-06-30 ASSESSMENT — SOCIAL DETERMINANTS OF HEALTH (SDOH): HOW OFTEN DO YOU GET TOGETHER WITH FRIENDS OR RELATIVES?: ONCE A WEEK

## 2025-07-02 ENCOUNTER — OFFICE VISIT (OUTPATIENT)
Dept: FAMILY MEDICINE | Facility: CLINIC | Age: 45
End: 2025-07-02
Attending: FAMILY MEDICINE
Payer: COMMERCIAL

## 2025-07-02 VITALS
HEART RATE: 93 BPM | HEIGHT: 72 IN | WEIGHT: 315 LBS | SYSTOLIC BLOOD PRESSURE: 116 MMHG | OXYGEN SATURATION: 99 % | BODY MASS INDEX: 42.66 KG/M2 | DIASTOLIC BLOOD PRESSURE: 77 MMHG | TEMPERATURE: 97 F | RESPIRATION RATE: 16 BRPM

## 2025-07-02 DIAGNOSIS — G47.33 OSA (OBSTRUCTIVE SLEEP APNEA): ICD-10-CM

## 2025-07-02 DIAGNOSIS — I10 BENIGN ESSENTIAL HYPERTENSION: ICD-10-CM

## 2025-07-02 DIAGNOSIS — Z00.00 ROUTINE GENERAL MEDICAL EXAMINATION AT A HEALTH CARE FACILITY: Primary | ICD-10-CM

## 2025-07-02 DIAGNOSIS — E66.01 MORBID OBESITY (H): ICD-10-CM

## 2025-07-02 PROCEDURE — 99396 PREV VISIT EST AGE 40-64: CPT | Performed by: PHYSICIAN ASSISTANT

## 2025-07-02 PROCEDURE — G2211 COMPLEX E/M VISIT ADD ON: HCPCS | Performed by: PHYSICIAN ASSISTANT

## 2025-07-02 PROCEDURE — 99213 OFFICE O/P EST LOW 20 MIN: CPT | Mod: 25 | Performed by: PHYSICIAN ASSISTANT

## 2025-07-02 PROCEDURE — 1126F AMNT PAIN NOTED NONE PRSNT: CPT | Performed by: PHYSICIAN ASSISTANT

## 2025-07-02 PROCEDURE — 3074F SYST BP LT 130 MM HG: CPT | Performed by: PHYSICIAN ASSISTANT

## 2025-07-02 PROCEDURE — 3078F DIAST BP <80 MM HG: CPT | Performed by: PHYSICIAN ASSISTANT

## 2025-07-02 RX ORDER — TIRZEPATIDE 2.5 MG/.5ML
2.5 INJECTION, SOLUTION SUBCUTANEOUS
Qty: 2 ML | Refills: 0 | Status: SHIPPED | OUTPATIENT
Start: 2025-07-02 | End: 2025-08-01

## 2025-07-02 RX ORDER — LISINOPRIL 20 MG/1
20 TABLET ORAL DAILY
Qty: 90 TABLET | Refills: 3 | Status: SHIPPED | OUTPATIENT
Start: 2025-07-02

## 2025-07-02 ASSESSMENT — PAIN SCALES - GENERAL: PAINLEVEL_OUTOF10: NO PAIN (0)

## 2025-07-02 NOTE — PROGRESS NOTES
Preventive Care Visit  [facility]  José Kolb PA-C, Family Medicine  Jul 2, 2025      Assessment & Plan       ICD-10-CM    1. Routine general medical examination at a health care facility  Z00.00       2. Morbid obesity (H)  E66.01 tirzepatide (MOUNJARO) 2.5 MG/0.5ML SOAJ auto-injector pen      3. Benign essential hypertension  I10 lisinopril (ZESTRIL) 20 MG tablet      4. AMADEO (obstructive sleep apnea)  G47.33 Adult ENT  Referral         Work on Healthy diet and exercise. Getting heart rate elevated for 30 mins most days of week.  Discussed GLP-1's based on his insurance coverage we will try to prescribe this.  Warning signs side effects were discussed.  3. medical conditions are stable. meds refilled.  4. Follow up  with ENT for 2nd opinion.       BMI  Estimated body mass index is 47.47 kg/m  as calculated from the following:    Height as of this encounter: 1.829 m (6').    Weight as of this encounter: 158.8 kg (350 lb).   Weight management plan: Discussed healthy diet and exercise guidelines  Reviewed preventive health counseling, as reflected in patient instructions       Regular exercise       Healthy diet/nutrition       Vision screening  Counseling  Appropriate preventive services were addressed with this patient via screening, questionnaire, or discussion as appropriate for fall prevention, nutrition, physical activity, Tobacco-use cessation, social engagement, weight loss and cognition.  Checklist reviewing preventive services available has been given to the patient.  Reviewed patient's diet, addressing concerns and/or questions.   He is at risk for lack of exercise and has been provided with information to increase physical activity for the benefit of his well-being.   He is at risk for psychosocial distress and has been provided with information to reduce risk.       Guillermo Roberts is a 44 year old, presenting for the following:  Physical        7/2/2025      9:36 AM   Additional Questions   Roomed by chanda   Accompanied by self         7/2/2025     9:36 AM   Patient Reported Additional Medications   Patient reports taking the following new medications no          HPI       Hypertension Follow-up    Do you check your blood pressure regularly outside of the clinic? No   Are you following a low salt diet? Yes  Are your blood pressures ever more than 140 on the top number (systolic) OR more   than 90 on the bottom number (diastolic), for example 140/90? No    AMADEO -Was told he needed his CPAP machine but tried and had struggles with that.  He does struggle with chronic nasal congestion and rhinitis and is interested in a second opinion for management of his sleep apnea.    BP Readings from Last 2 Encounters:   07/02/25 116/77   09/19/24 137/87       Advance Care Planning    Discussed advance care planning with patient; informed AVS has link to [facility] [employer].        6/30/2025   General Health   How would you rate your overall physical health? (!) FAIR   Feel stress (tense, anxious, or unable to sleep) To some extent   (!) STRESS CONCERN      6/30/2025   Nutrition   Three or more servings of calcium each day? Yes   Diet: Low salt   How many servings of fruit and vegetables per day? (!) 2-3   How many sweetened beverages each day? 0-1         6/30/2025   Exercise   Days per week of moderate/strenous exercise 2 days   Average minutes spent exercising at this level 20 min   (!) EXERCISE CONCERN      6/30/2025   Social Factors   Frequency of gathering with friends or relatives Once a week   Worry food won't last until get money to buy more No   Food not last or not have enough money for food? No   Do you have housing? (Housing is defined as stable permanent housing and does not include staying outside in a car, in a tent, in an abandoned building, in an overnight shelter, or couch-surfing.) Yes   Are you worried about losing your housing? No   Lack of transportation? No    Unable to get utilities (heat,electricity)? No         2025   Dental   Dentist two times every year? Yes         Today's PHQ-2 Score:       2025     9:36 AM   PHQ-2 (  [employer])   Q1: Little interest or pleasure in doing things 0   Q2: Feeling down, depressed or hopeless 0   PHQ-2 Score 0    Q1: Little interest or pleasure in doing things Not at all   Q2: Feeling down, depressed or hopeless Not at all   PHQ-2 Score 0       Patient-reported           2025   Substance Use   Alcohol more than 3/day or more than 7/wk No   Do you use any other substances recreationally? No     Social History     Tobacco Use    Smoking status: Former     Current packs/day: 0.00     Average packs/day: 0.1 packs/day for 17.0 years (1.7 ttl pk-yrs)     Types: Cigarettes     Start date: 1996     Quit date: 2013     Years since quittin.5     Passive exposure: Never    Smokeless tobacco: Never   Vaping Use    Vaping status: Never Used   Substance Use Topics    Alcohol use: Yes     Alcohol/week: 40.0 standard drinks of alcohol     Types: 40 Standard drinks or equivalent per week     Comment: 12 salvador once a month 2024    Drug use: No             2025   One time HIV Screening   Previous HIV test? No         2025   STI Screening   New sexual partner(s) since last STI/HIV test? No   ASCVD Risk   The 10-year ASCVD risk score (Kaveh GRIFFITH, et al., 2019) is: 1.6%    Values used to calculate the score:      Age: 44 years      Sex: Male      Is Non- : No      Diabetic: No      Tobacco smoker: No      Systolic Blood Pressure: 116 mmHg      Is BP treated: Yes      HDL Cholesterol: 30 mg/dL      Total Cholesterol: 149 mg/dL        2025   Contraception/Family Planning   Questions about contraception or family planning No        Reviewed and updated as needed this visit by Provider   Tobacco  Allergies  Meds  Problems  Med Hx  Surg Hx  Fam Hx            Past Medical History:    Diagnosis Date    Fatty liver     CT scan  w/ hepatic steatosis    GERD (gastroesophageal reflux disease)     Hypertension     AMADEO (obstructive sleep apnea)     Does not use anything, encouraged to restart cpap .    Reflux     Restless legs      Past Surgical History:   Procedure Laterality Date    CYST REMOVAL      SOFT TISSUE SURGERY      cyst removal     Labs reviewed in EPIC  BP Readings from Last 3 Encounters:   25 116/77   24 137/87   24 136/86    Wt Readings from Last 3 Encounters:   25 (!) 158.8 kg (350 lb)   12/10/24 (!) 158.8 kg (350 lb)   24 (!) 158.8 kg (350 lb)                  Patient Active Problem List   Diagnosis    Benign essential hypertension    Hyperlipidemia LDL goal <130    Hepatic steatosis    History of alcohol dependence (H)    AMADEO (obstructive sleep apnea) Severe    Restless leg    Prediabetes    Microalbuminuria    Morbid obesity with BMI of 45.0-49.9, adult (H)     Past Surgical History:   Procedure Laterality Date    CYST REMOVAL      SOFT TISSUE SURGERY      cyst removal       Social History     Tobacco Use    Smoking status: Former     Current packs/day: 0.00     Average packs/day: 0.1 packs/day for 17.0 years (1.7 ttl pk-yrs)     Types: Cigarettes     Start date: 1996     Quit date: 2013     Years since quittin.5     Passive exposure: Never    Smokeless tobacco: Never   Substance Use Topics    Alcohol use: Yes     Alcohol/week: 40.0 standard drinks of alcohol     Types: 40 Standard drinks or equivalent per week     Comment: 12 salvador once a month 2024     Family History   Problem Relation Age of Onset    Diabetes Mother     Hypertension Mother     C.A.D. Father 70    Snoring Father     Cancer - colorectal Maternal Grandfather     Diabetes Paternal Grandmother     Cerebrovascular Disease Paternal Grandmother     Asthma No family hx of     Breast Cancer No family hx of     Prostate Cancer No family hx of     Thyroid Disease No family  hx of     Lipids No family hx of          Current Outpatient Medications   Medication Sig Dispense Refill    aspirin 81 MG tablet Take 1 tablet by mouth daily. 30 tablet 3    Blood Pressure Monitor KIT Use to check blood pressure daily 1 kit 0    lisinopril (ZESTRIL) 20 MG tablet Take 1 tablet (20 mg) by mouth daily. 90 tablet 3    tirzepatide (MOUNJARO) 2.5 MG/0.5ML SOAJ auto-injector pen Inject 0.5 mLs (2.5 mg) subcutaneously every 7 days. 2 mL 0     No Known Allergies  Recent Labs   Lab Test 06/30/25  0815 06/14/24  0749 03/31/23  1411 11/14/22  1221 05/05/22  1402 03/17/21  0845 03/09/21  1849 03/04/20  1709 08/20/19  0707 07/03/17  0702   A1C 5.7* 5.5 5.4  --    < > 5.5  --    < > 5.9*  --    * 101* 73  --    < >  --   --    < > 90 121*   HDL 30* 33* 31*  --    < >  --   --    < > 35* 41   TRIG 69 80 208*  --    < >  --   --    < > 98 116   ALT 28 31  --  55   < >  --  53  --  54 60   CR 1.07 1.13  --  1.04   < > 1.12 2.25*   < > 1.18 1.09   GFRESTIMATED 89 84  --  >90   < > 83 36*   < > 78 77   GFRESTBLACK  --   --   --   --   --  >90 41*   < > >90 >90  African American GFR Calc     POTASSIUM 4.6 4.4  --  4.1   < > 4.2 4.0   < > 4.3 4.1   TSH  --   --   --   --   --   --   --   --  2.27 2.70    < > = values in this interval not displayed.          Review of Systems  Constitutional, HEENT, cardiovascular, pulmonary, gi and gu systems are negative, except as otherwise noted.     Objective    Exam  /77   Pulse 93   Temp 97  F (36.1  C) (Oral)   Resp 16   Ht 1.829 m (6')   Wt (!) 158.8 kg (350 lb)   SpO2 99%   BMI 47.47 kg/m     Estimated body mass index is 47.47 kg/m  as calculated from the following:    Height as of this encounter: 1.829 m (6').    Weight as of this encounter: 158.8 kg (350 lb).    Physical Exam  GENERAL: alert and no distress  EYES: Eyes grossly normal to inspection, PERRL and conjunctivae and sclerae normal  HENT: ear canals and TM's normal, nose and mouth without ulcers or  lesions  NECK: no adenopathy, no asymmetry, masses, or scars  RESP: lungs clear to auscultation - no rales, rhonchi or wheezes  CV: regular rate and rhythm, normal S1 S2, no S3 or S4, no murmur, click or rub, no peripheral edema  ABDOMEN: soft, nontender, no hepatosplenomegaly, no masses and bowel sounds normal  MS: no gross musculoskeletal defects noted, no edema  SKIN: no suspicious lesions or rashes  NEURO: Normal strength and tone, mentation intact and speech normal  PSYCH: mentation appears normal, affect normal/bright        Signed Electronically by: José Kolb PA-C

## 2025-07-02 NOTE — PATIENT INSTRUCTIONS
Patient Education   Preventive Care Advice   This is general advice given by our system to help you stay healthy. However, your care team may have specific advice just for you. Please talk to your care team about your preventive care needs.  Nutrition  Eat 5 or more servings of fruits and vegetables each day.  Try wheat bread, brown rice and whole grain pasta (instead of white bread, rice, and pasta).  Get enough calcium and vitamin D. Check the label on foods and aim for 100% of the RDA (recommended daily allowance).  Lifestyle  Exercise at least 150 minutes each week  (30 minutes a day, 5 days a week).  Do muscle strengthening activities 2 days a week. These help control your weight and prevent disease.  No smoking.  Wear sunscreen to prevent skin cancer.  Have a dental exam and cleaning every 6 months.  Yearly exams  See your health care team every year to talk about:  Any changes in your health.  Any medicines your care team has prescribed.  Preventive care, family planning, and ways to prevent chronic diseases.  Shots (vaccines)   HPV shots (up to age 26), if you've never had them before.  Hepatitis B shots (up to age 59), if you've never had them before.  COVID-19 shot: Get this shot when it's due.  Flu shot: Get a flu shot every year.  Tetanus shot: Get a tetanus shot every 10 years.  Pneumococcal, hepatitis A, and RSV shots: Ask your care team if you need these based on your risk.  Shingles shot (for age 50 and up)  General health tests  Diabetes screening:  Starting at age 35, Get screened for diabetes at least every 3 years.  If you are younger than age 35, ask your care team if you should be screened for diabetes.  Cholesterol test: At age 39, start having a cholesterol test every 5 years, or more often if advised.  Bone density scan (DEXA): At age 50, ask your care team if you should have this scan for osteoporosis (brittle bones).  Hepatitis C: Get tested at least once in your life.  STIs (sexually  transmitted infections)  Before age 24: Ask your care team if you should be screened for STIs.  After age 24: Get screened for STIs if you're at risk. You are at risk for STIs (including HIV) if:  You are sexually active with more than one person.  You don't use condoms every time.  You or a partner was diagnosed with a sexually transmitted infection.  If you are at risk for HIV, ask about PrEP medicine to prevent HIV.  Get tested for HIV at least once in your life, whether you are at risk for HIV or not.  Cancer screening tests  Cervical cancer screening: If you have a cervix, begin getting regular cervical cancer screening tests starting at age 21.  Breast cancer scan (mammogram): If you've ever had breasts, begin having regular mammograms starting at age 40. This is a scan to check for breast cancer.  Colon cancer screening: It is important to start screening for colon cancer at age 45.  Have a colonoscopy test every 10 years (or more often if you're at risk) Or, ask your provider about stool tests like a FIT test every year or Cologuard test every 3 years.  To learn more about your testing options, visit:   .  For help making a decision, visit:   https://bit.ly/vy40544.  Prostate cancer screening test: If you have a prostate, ask your care team if a prostate cancer screening test (PSA) at age 55 is right for you.  Lung cancer screening: If you are a current or former smoker ages 50 to 80, ask your care team if ongoing lung cancer screenings are right for you.  For informational purposes only. Not to replace the advice of your health care provider. Copyright   2023 [facility]. All rights reserved. Clinically reviewed by the [facility] Transitions Program. [employer] 421633 - REV 01/24.  Learning About Stress  What is stress?     Stress is your body's response to a hard situation. Your body can have a physical, emotional, or mental response. Stress is a fact of life for most people, and it  affects everyone differently. What causes stress for you may not be stressful for someone else.  A lot of things can cause stress. You may feel stress when you go on a job interview, take a test, or run a race. This kind of short-term stress is normal and even useful. It can help you if you need to work hard or react quickly. For example, stress can help you finish an important job on time.  Long-term stress is caused by ongoing stressful situations or events. Examples of long-term stress include long-term health problems, ongoing problems at work, or conflicts in your family. Long-term stress can harm your health.  How does stress affect your health?  When you are stressed, your body responds as though you are in danger. It makes hormones that speed up your heart, make you breathe faster, and give you a burst of energy. This is called the fight-or-flight stress response. If the stress is over quickly, your body goes back to normal and no harm is done.  But if stress happens too often or lasts too long, it can have bad effects. Long-term stress can make you more likely to get sick, and it can make symptoms of some diseases worse. If you tense up when you are stressed, you may develop neck, shoulder, or low back pain. Stress is linked to high blood pressure and heart disease.  Stress also harms your emotional health. It can make you velez, tense, or depressed. Your relationships may suffer, and you may not do well at work or school.  What can you do to manage stress?  You can try these things to help manage stress:   Do something active. Exercise or activity can help reduce stress. Walking is a great way to get started. Even everyday activities such as housecleaning or yard work can help.  Try yoga or zhen chi. These techniques combine exercise and meditation. You may need some training at first to learn them.  Do something you enjoy. For example, listen to music or go to a movie. Practice your hobby or do volunteer  "work.  Meditate. This can help you relax, because you are not worrying about what happened before or what may happen in the future.  Do guided imagery. Imagine yourself in any setting that helps you feel calm. You can use online videos, books, or a teacher to guide you.  Do breathing exercises. For example:  From a standing position, bend forward from the waist with your knees slightly bent. Let your arms dangle close to the floor.  Breathe in slowly and deeply as you return to a standing position. Roll up slowly and lift your head last.  Hold your breath for just a few seconds in the standing position.  Breathe out slowly and bend forward from the waist.  Let your feelings out. Talk, laugh, cry, and express anger when you need to. Talking with supportive friends or family, a counselor, or a wil leader about your feelings is a healthy way to relieve stress. Avoid discussing your feelings with people who make you feel worse.  Write. It may help to write about things that are bothering you. This helps you find out how much stress you feel and what is causing it. When you know this, you can find better ways to cope.  What can you do to prevent stress?  You might try some of these things to help prevent stress:  Manage your time. This helps you find time to do the things you want and need to do.  Get enough sleep. Your body recovers from the stresses of the day while you are sleeping.  Get support. Your family, friends, and community can make a difference in how you experience stress.  Limit your news feed. Avoid or limit time on social media or news that may make you feel stressed.  Do something active. Exercise or activity can help reduce stress. Walking is a great way to get started.  Where can you learn more?  Go to https://www.[employer].net/patiented  Enter N032 in the search box to learn more about \"Learning About Stress.\"  Current as of: October 24, 2024  Content Version: 14.5 2024-2025 [employer].   Care instructions adapted under license by your healthcare professional. If you have questions about a medical condition or this instruction, always ask your healthcare professional. [employer] disclaims any warranty or liability for your use of this information.

## 2025-07-03 ENCOUNTER — PATIENT OUTREACH (OUTPATIENT)
Dept: CARE COORDINATION | Facility: CLINIC | Age: 45
End: 2025-07-03
Payer: COMMERCIAL

## 2025-07-07 ENCOUNTER — TELEPHONE (OUTPATIENT)
Dept: FAMILY MEDICINE | Facility: CLINIC | Age: 45
End: 2025-07-07
Payer: COMMERCIAL

## 2025-07-07 ENCOUNTER — PATIENT OUTREACH (OUTPATIENT)
Dept: CARE COORDINATION | Facility: CLINIC | Age: 45
End: 2025-07-07
Payer: COMMERCIAL

## 2025-07-08 NOTE — TELEPHONE ENCOUNTER
PRIOR AUTHORIZATION DENIED    Medication: TIRZEPATIDE 2.5 MG/0.5ML SC SOAJ  Insurance Company: [employer] [place] - Phone [phone] Fax [phone]  Denial Date: 7/5/2025  Denial Reason(s):         Appeal Information:     Patient Notified: No